# Patient Record
Sex: MALE | Race: WHITE | NOT HISPANIC OR LATINO | Employment: OTHER | URBAN - METROPOLITAN AREA
[De-identification: names, ages, dates, MRNs, and addresses within clinical notes are randomized per-mention and may not be internally consistent; named-entity substitution may affect disease eponyms.]

---

## 2017-02-07 ENCOUNTER — HOSPITAL ENCOUNTER (OUTPATIENT)
Dept: RADIOLOGY | Facility: HOSPITAL | Age: 52
Discharge: HOME | End: 2017-02-07
Attending: PAIN MEDICINE
Payer: MEDICARE

## 2017-02-07 DIAGNOSIS — M47.816 FACET ARTHRITIS, DEGENERATIVE, LUMBAR SPINE: ICD-10-CM

## 2017-02-07 PROCEDURE — 72110 X-RAY EXAM L-2 SPINE 4/>VWS: CPT | Mod: 26,,, | Performed by: RADIOLOGY

## 2017-02-07 PROCEDURE — 72110 X-RAY EXAM L-2 SPINE 4/>VWS: CPT | Mod: TC

## 2017-04-02 ENCOUNTER — HOSPITAL ENCOUNTER (EMERGENCY)
Facility: HOSPITAL | Age: 52
Discharge: HOME | End: 2017-04-02
Attending: SURGERY
Payer: MEDICARE

## 2017-04-02 VITALS
DIASTOLIC BLOOD PRESSURE: 72 MMHG | HEART RATE: 80 BPM | BODY MASS INDEX: 27.12 KG/M2 | RESPIRATION RATE: 16 BRPM | WEIGHT: 168 LBS | SYSTOLIC BLOOD PRESSURE: 118 MMHG | TEMPERATURE: 96 F

## 2017-04-02 DIAGNOSIS — G89.29 CHRONIC RIGHT SHOULDER PAIN: Primary | ICD-10-CM

## 2017-04-02 DIAGNOSIS — M25.511 CHRONIC RIGHT SHOULDER PAIN: Primary | ICD-10-CM

## 2017-04-02 PROCEDURE — 99283 EMERGENCY DEPT VISIT LOW MDM: CPT | Mod: 25

## 2017-04-02 PROCEDURE — 96372 THER/PROPH/DIAG INJ SC/IM: CPT

## 2017-04-02 PROCEDURE — 29240 STRAPPING OF SHOULDER: CPT

## 2017-04-02 PROCEDURE — 63600175 PHARM REV CODE 636 W HCPCS: Performed by: SURGERY

## 2017-04-02 RX ORDER — HYDROCODONE BITARTRATE AND ACETAMINOPHEN 7.5; 325 MG/1; MG/1
1 TABLET ORAL EVERY 6 HOURS PRN
COMMUNITY
End: 2018-11-30

## 2017-04-02 RX ORDER — LISINOPRIL 10 MG/1
10 TABLET ORAL DAILY
COMMUNITY
End: 2019-01-07

## 2017-04-02 RX ORDER — PROMETHAZINE HYDROCHLORIDE 25 MG/1
25 TABLET ORAL EVERY 4 HOURS
COMMUNITY
End: 2018-11-30

## 2017-04-02 RX ORDER — CYCLOBENZAPRINE HCL 10 MG
10 TABLET ORAL 3 TIMES DAILY PRN
Qty: 10 TABLET | Refills: 0 | Status: SHIPPED | OUTPATIENT
Start: 2017-04-02 | End: 2017-04-07

## 2017-04-02 RX ORDER — KETOROLAC TROMETHAMINE 10 MG/1
10 TABLET, FILM COATED ORAL EVERY 6 HOURS PRN
Qty: 15 TABLET | Refills: 0 | Status: SHIPPED | OUTPATIENT
Start: 2017-04-02 | End: 2017-07-04 | Stop reason: ALTCHOICE

## 2017-04-02 RX ORDER — PROMETHAZINE HYDROCHLORIDE 25 MG/ML
12.5 INJECTION, SOLUTION INTRAMUSCULAR; INTRAVENOUS
Status: COMPLETED | OUTPATIENT
Start: 2017-04-02 | End: 2017-04-02

## 2017-04-02 RX ORDER — HYDROMORPHONE HYDROCHLORIDE 1 MG/ML
1 INJECTION, SOLUTION INTRAMUSCULAR; INTRAVENOUS; SUBCUTANEOUS
Status: COMPLETED | OUTPATIENT
Start: 2017-04-02 | End: 2017-04-02

## 2017-04-02 RX ADMIN — PROMETHAZINE HYDROCHLORIDE 12.5 MG: 25 INJECTION INTRAMUSCULAR; INTRAVENOUS at 09:04

## 2017-04-02 RX ADMIN — HYDROMORPHONE HYDROCHLORIDE 1 MG: 1 INJECTION, SOLUTION INTRAMUSCULAR; INTRAVENOUS; SUBCUTANEOUS at 09:04

## 2017-04-02 NOTE — ED AVS SNAPSHOT
[facility]  [address]  [place] [address]               Miles Currie   2017  9:03 PM   ED    Description:  Male : 1965   Department:  [facility]           Your Care was Coordinated By:     Provider Role From To    Bernard Jimenez MD Attending Provider 17 --      Reason for Visit     Shoulder Pain           Diagnoses this Visit        Comments    Chronic right shoulder pain    -  Primary       ED Disposition     ED Disposition Condition Comment    Discharge             To Do List           Follow-up Information     Follow up with Naresh Guevara MD.    Specialty:  Orthopedic Surgery    Contact information:    [place] [address]  [phone]         These Medications        Disp Refills Start End    ketorolac (TORADOL) 10 mg tablet 15 tablet 0 2017     Take 1 tablet (10 mg total) by mouth every 6 (six) hours as needed for Pain. - Oral    cyclobenzaprine (FLEXERIL) 10 MG tablet 10 tablet 0 2017    Take 1 tablet (10 mg total) by mouth 3 (three) times daily as needed for Muscle spasms. - Oral      [facility]     [facility] Nurse Care Line -  Assistance  Unless otherwise directed by your provider, please contact [facility], our nurse care line that is available for  assistance.     Registered nurses in the [facility] provide: appointment scheduling, clinical advisement, health education, and other advisory services.  Call: [phone] (toll free)               Medications           Message regarding Medications     Verify the changes and/or additions to your medication regime listed below are the same as discussed with your clinician today.  If any of these changes or additions are incorrect, please notify your healthcare provider.        START taking these NEW medications        Refills    ketorolac (TORADOL) 10 mg tablet 0    Sig: Take 1 tablet (10 mg  total) by mouth every 6 (six) hours as needed for Pain.    Class: Print    Route: Oral    cyclobenzaprine (FLEXERIL) 10 MG tablet 0    Sig: Take 1 tablet (10 mg total) by mouth 3 (three) times daily as needed for Muscle spasms.    Class: Print    Route: Oral      These medications were administered today        Dose Freq    HYDROmorphone injection 1 mg 1 mg ED 1 Time    Sig: Inject 1 mL (1 mg total) into the muscle ED 1 Time.    Class: Normal    Route: Intramuscular    promethazine injection 12.5 mg 12.5 mg ED 1 Time    Sig: Inject 0.5 mLs (12.5 mg total) into the muscle ED 1 Time.    Class: Normal    Route: Intramuscular           Verify that the below list of medications is an accurate representation of the medications you are currently taking.  If none reported, the list may be blank. If incorrect, please contact your healthcare provider. Carry this list with you in case of emergency.           Current Medications     hydrocodone-acetaminophen 7.5-325mg (NORCO) 7.5-325 mg per tablet Take 1 tablet by mouth every 6 (six) hours as needed for Pain.    lisinopril 10 MG tablet Take 10 mg by mouth once daily.    promethazine (PHENERGAN) 25 MG tablet Take 25 mg by mouth every 4 (four) hours.    clonazePAM (KLONOPIN) 0.5 MG tablet Take 0.5 mg by mouth 2 (two) times daily as needed for Anxiety.    cyclobenzaprine (FLEXERIL) 10 MG tablet Take 1 tablet (10 mg total) by mouth 3 (three) times daily as needed for Muscle spasms.    ketorolac (TORADOL) 10 mg tablet Take 1 tablet (10 mg total) by mouth every 6 (six) hours as needed for Pain.    methylPREDNISolone (MEDROL DOSEPACK) 4 mg tablet Pack as directed           Clinical Reference Information           Your Vitals Were     BP Pulse Temp Resp Weight BMI    137/66 94 95.9 °F (35.5 °C) (Oral) 18 76.2 kg (168 lb) 27.12 kg/m2      Allergies as of 4/2/2017     No Known Allergies      Immunizations Administered on Date of Encounter - 4/2/2017     None      ED Micro, Lab, POCT      None      ED Imaging Orders     Start Ordered       Status Ordering Provider    04/02/17 2100 04/02/17 2059  X-ray Shoulder 2 or More Views Right  1 time imaging      In process       Discharge References/Attachments     SHOULDER JOINT, THE (ENGLISH)      [facility] Sign-Up     Activating your [facility] account is as easy as 1-2-3!     1) Visit [email or web address], select Sign Up Now, enter this activation code and your date of birth, then select Next.  [email or web address]  Expires: 5/17/2017 10:12 PM      2) Create a username and password to use when you visit [facility] in the future and select a security question in case you lose your password and select Next.    3) Enter your e-mail address and click Sign Up!    Additional Information  If you have questions, please e-mail [email or web address] or call [phone] to talk to our [facility] staff. Remember, [facility] is NOT to be used for urgent needs. For medical emergencies, dial 911.          [facility] complies with applicable Federal civil rights laws and does not discriminate on the basis of race, color, national origin, age, disability, or sex.        Language Assistance Services     ATTENTION: Language assistance services are available, free of charge. Please call [phone].      ATENCIÓN: Si habla español, tiene a grimm disposición servicios gratuitos de asistencia lingüística. Llame al [phone].     CHÚ Ý: N?u b?n nói Ti?ng Vi?t, có các d?ch v? h? tr? ngôn ng? mi?n phí dành cho b?n. G?i s? [phone].

## 2017-04-03 NOTE — ED TRIAGE NOTES
C/O right shoulder pain for 2 to 3 weeks.  Sees pain management for back.  States cannot have MRI due to pacemaker.

## 2017-04-03 NOTE — ED PROVIDER NOTES
[facility]                                        April 2, 2017                   Chief Complaint  51 y.o. male with Shoulder Pain (right)    History of Present Illness  Miles Currie presents to the emergency room with right shoulder pain chronically  Patient states he injured his right shoulder 2 months ago with a dislocation at that time  Patient states he reduced his shoulder dislocation on his own with residual pain after  Patient on exam has a normal right shoulder no deformity or instability or bruising now  Patient has no swelling, no signs of rotator cuff injury on ER evaluation, normal tone  Patient has good distal pulses and capillary refill, is neurovascular intact on exam    The history is provided by the patient  Medical history: CAD, HTN, mitral incompetence  Surgical history: Cardiac pacemaker, cardiac surgery  No Known Allergies     Review of Systems and Physical Exam     Review of Systems  -- Constitution - no fever, denies fatigue, no weakness, no chills  -- Eyes - no tearing or redness, no visual disturbance  -- Ear, Nose - no tinnitus or earache, no nasal congestion or discharge  -- Mouth,Throat - no sore throat, no toothache, normal voice, normal swallowing  -- Respiratory - denies cough and congestion, no shortness of breath, no PIERRE  -- Cardiovascular - denies chest pain, no palpitations, denies claudication  -- Gastrointestinal - denies abdominal pain, nausea, vomiting, or diarrhea  -- Musculoskeletal - right shoulder pain chronically  -- Neurological - no headache, denies weakness or seizure; no LOC  -- Skin - denies pallor, rash, or changes in skin. no hives or welts noted    Vital Signs  -- His blood pressure is 118/72 and his pulse is 80. His respiration is 16.      Physical Exam  -- Nursing note and vitals reviewed  -- Constitutional: Appears well-developed and well-nourished  -- Head: Atraumatic. Normocephalic. No obvious abnormality  -- Eyes: Pupils are equal and  reactive to light. Normal conjunctiva and lids  -- Neck: Normal range of motion. Neck supple. No masses, trachea midline  -- Cardiac: Normal rate, regular rhythm and normal heart sounds  -- Pulmonary: Normal respiratory effort, breath sounds clear to auscultation  -- Abdominal: Soft, no tenderness. Normal bowel sounds. Normal liver edge  -- Musculoskeletal: Normal range of motion, no effusions. Joints stable   -- Neurological: No focal deficits. Showed good interaction with staff  -- Vascular: Posterior tibial, dorsalis pedis and radial pulses 2+ bilaterally      Emergency Room Course     Treatment and Evaluation  -- Preliminary ER x-ray readings showed no evidence of fracture or dislocation  -- All x-rays are reviewed with a final disposition given by the radiologist   -- Sling and swathe placed on the affected limb by the CNA for comfort and decreased pain    -- IM 1 mg Dilaudid given today in the ER  -- IM 12.5 mg Phenergan given today in the ER    Diagnosis  -- The encounter diagnosis was Chronic right shoulder pain.    Disposition and Plan  -- Disposition: home  -- Condition: stable  -- Follow-up: Patient to follow up with Sarika Pavon MD in 1-2 days.  -- I advised the patient that we have found no life threatening condition today  -- At this time, I believe the patient is clinically stable for discharge.   -- The patient acknowledges that close follow up with a MD is required   -- Patient agrees to comply with all instruction and direction given in the ER    This note is dictated on Dragon Natural Speaking word recognition program.  There are word recognition mistakes that are occasionally missed on review.           Bernard Jimenez MD  04/02/17 0774

## 2017-04-25 ENCOUNTER — PATIENT MESSAGE (OUTPATIENT)
Dept: ENDOCRINOLOGY | Facility: CLINIC | Age: 52
End: 2017-04-25

## 2017-04-25 ENCOUNTER — HOSPITAL ENCOUNTER (OUTPATIENT)
Dept: RADIOLOGY | Facility: HOSPITAL | Age: 52
Discharge: HOME | End: 2017-04-25
Attending: PAIN MEDICINE
Payer: MEDICARE

## 2017-04-25 DIAGNOSIS — S46.009A ROTATOR CUFF INJURY: ICD-10-CM

## 2017-04-25 DIAGNOSIS — M19.019 OSTEOARTHRITIS OF SHOULDER: ICD-10-CM

## 2017-04-25 PROCEDURE — 73200 CT UPPER EXTREMITY W/O DYE: CPT | Mod: TC,RT

## 2017-04-25 PROCEDURE — 73200 CT UPPER EXTREMITY W/O DYE: CPT | Mod: 26,RT,, | Performed by: RADIOLOGY

## 2017-04-28 ENCOUNTER — TELEPHONE (OUTPATIENT)
Dept: INTERNAL MEDICINE | Facility: CLINIC | Age: 52
End: 2017-04-28

## 2017-04-28 NOTE — TELEPHONE ENCOUNTER
----- Message from Cassandra Olea sent at 4/25/2017 11:46 AM CDT -----  Contact: my chart  Appointment Request From: Miles Currie         With Provider: Other - (see comments) [oth]        Would Accept With:Request to see a new provider        Preferred Date Range: From 4/24/2017 To 4/28/2017        Preferred Times: Any        Reason for visit: Request an Appt        Comments:

## 2017-07-04 ENCOUNTER — HOSPITAL ENCOUNTER (EMERGENCY)
Facility: HOSPITAL | Age: 52
Discharge: HOME | End: 2017-07-04
Attending: SURGERY
Payer: MEDICARE

## 2017-07-04 VITALS
BODY MASS INDEX: 29.05 KG/M2 | DIASTOLIC BLOOD PRESSURE: 76 MMHG | WEIGHT: 180 LBS | RESPIRATION RATE: 20 BRPM | HEART RATE: 93 BPM | SYSTOLIC BLOOD PRESSURE: 153 MMHG | TEMPERATURE: 97 F

## 2017-07-04 DIAGNOSIS — K08.89 PAIN, DENTAL: Primary | ICD-10-CM

## 2017-07-04 PROCEDURE — 96372 THER/PROPH/DIAG INJ SC/IM: CPT

## 2017-07-04 PROCEDURE — 63600175 PHARM REV CODE 636 W HCPCS: Performed by: SURGERY

## 2017-07-04 PROCEDURE — 99283 EMERGENCY DEPT VISIT LOW MDM: CPT | Mod: 25

## 2017-07-04 RX ORDER — KETOROLAC TROMETHAMINE 10 MG/1
10 TABLET, FILM COATED ORAL EVERY 6 HOURS PRN
Qty: 15 TABLET | Refills: 0 | OUTPATIENT
Start: 2017-07-04 | End: 2018-11-30

## 2017-07-04 RX ORDER — KETOROLAC TROMETHAMINE 30 MG/ML
60 INJECTION, SOLUTION INTRAMUSCULAR; INTRAVENOUS
Status: COMPLETED | OUTPATIENT
Start: 2017-07-04 | End: 2017-07-04

## 2017-07-04 RX ORDER — AMOXICILLIN 875 MG/1
875 TABLET, FILM COATED ORAL 2 TIMES DAILY
Qty: 14 TABLET | Refills: 0 | Status: SHIPPED | OUTPATIENT
Start: 2017-07-04 | End: 2017-07-11

## 2017-07-04 RX ADMIN — KETOROLAC TROMETHAMINE 60 MG: 30 INJECTION, SOLUTION INTRAMUSCULAR at 10:07

## 2017-07-04 RX ADMIN — PENICILLIN G BENZATHINE 1.2 MILLION UNITS: 1200000 INJECTION, SUSPENSION INTRAMUSCULAR at 10:07

## 2017-07-04 NOTE — ED NOTES
Injection given as ordered and charted per MAR. Instructed to wait 15 additional minutes prior to leaving to monitor for reaction. Instructed to notify staff if reaction is suspected. Pt voiced understanding.

## 2017-07-04 NOTE — ED TRIAGE NOTES
Assumed care of 51 y.o. male presents to ER   Chief Complaint   Patient presents with    Dental Pain     right lower side   onset two days ago. No acute distress noted.

## 2017-07-04 NOTE — ED PROVIDER NOTES
[facility]                                        July 4, 2017                   Chief Complaint  51 y.o. male with Dental Pain (right lower side)    History of Present Illness  Miles Currie presents to the emergency room with dental pain this week  Patient on exam is upper lower molar cavities with no facial swelling noted  In particular, the patient has a severe right lower molar cavity with pain now  Patient has no fever or signs of dental abscess, cannot afford a dentist now  Pt is afebrile with good stable vital signs, only complaint today is dental pain    The history is provided by the patient  Medical history: CAD, HTN, mitral incompetence  Surgical history: Cardiac pacemaker, cardiac surgery  No Known Allergies   History reviewed. No pertinent family history.    Review of Systems and Physical Exam     Review of Systems  -- Constitution - no fever, denies fatigue, no weakness, no chills  -- Eyes - no tearing or redness, no visual disturbance  -- Ear, Nose - no tinnitus or earache, no nasal congestion or discharge  -- Mouth,Throat - toothache, normal voice, normal swallowing  -- Respiratory - denies cough and congestion, no shortness of breath, no PIERRE  -- Cardiovascular - denies chest pain, no palpitations, denies claudication  -- Gastrointestinal - denies abdominal pain, nausea, vomiting, or diarrhea  -- Musculoskeletal - denies back pain, negative for myalgias and arthralgias   -- Neurological - no headache, denies weakness or seizure; no LOC  -- Skin - denies pallor, rash, or changes in skin. no hives or welts noted    Vital Signs  -- His oral temperature is 96.7 °F (35.9 °C).   -- His blood pressure is 153/76 and his pulse is 93.   -- His respiration is 20.      Physical Exam  -- Nursing note and vitals reviewed  -- Constitutional: Appears well-developed and well-nourished  -- Head: Atraumatic. Normocephalic. No obvious abnormality  -- Eyes: Pupils are equal and reactive to light.  Normal conjunctiva and lids  -- Nose: Nose normal in appearance, nares grossly normal. No discharge  -- Throat: several upper and lower bilateral molar cavities with no facial swelling     -- Ears: External ears and TM normal bilaterally. Normal hearing and no drainage  -- Neck: Normal range of motion. Neck supple. No masses, trachea midline  -- Cardiac: Normal rate, regular rhythm and normal heart sounds  -- Pulmonary: Normal respiratory effort, breath sounds clear to auscultation  -- Abdominal: Soft, no tenderness. Normal bowel sounds. Normal liver edge  -- Musculoskeletal: Normal range of motion, no effusions. Joints stable   -- Neurological: No focal deficits. Showed good interaction with staff  -- Vascular: Posterior tibial, dorsalis pedis and radial pulses 2+ bilaterally      Emergency Room Course     Treatment and Evaluation  -- IM 1.2 million units Bicillin given today in the ER  -- IM 60 mg Toradol given today in the ER    Diagnosis  -- The encounter diagnosis was Pain, dental.    Disposition and Plan  -- Disposition: to dentist ASAP  -- Condition: stable  -- Pt given instructions; take all medications prescribed in the ER as directed.   -- Patient agrees to comply with all instructions- needs appropriate dental care  -- Pt agrees to return to ER if any symptoms reoccur; symptom-free on discharge  -- Patient to follow up with a dentist in 1-2 days. Has been given follow up information  -- The patient acknowledges that dental follow up is required for this issue     This note is dictated on Dragon Natural Speaking word recognition program.  There are word recognition mistakes that are occasionally missed on review.             Bernard Jimenez MD  07/04/17 6578

## 2017-11-25 ENCOUNTER — HOSPITAL ENCOUNTER (EMERGENCY)
Facility: HOSPITAL | Age: 52
Discharge: HOME | End: 2017-11-25
Attending: SURGERY
Payer: MEDICARE

## 2017-11-25 VITALS
WEIGHT: 175 LBS | HEART RATE: 90 BPM | SYSTOLIC BLOOD PRESSURE: 150 MMHG | RESPIRATION RATE: 16 BRPM | BODY MASS INDEX: 28.25 KG/M2 | DIASTOLIC BLOOD PRESSURE: 60 MMHG | TEMPERATURE: 98 F | OXYGEN SATURATION: 97 %

## 2017-11-25 DIAGNOSIS — R05.3 COUGH, PERSISTENT: Primary | ICD-10-CM

## 2017-11-25 DIAGNOSIS — T46.4X5A COUGH DUE TO ACE INHIBITOR: ICD-10-CM

## 2017-11-25 DIAGNOSIS — R05.8 COUGH DUE TO ACE INHIBITOR: ICD-10-CM

## 2017-11-25 LAB
ALBUMIN SERPL BCP-MCNC: 3.8 G/DL
ALP SERPL-CCNC: 145 U/L
ALT SERPL W/O P-5'-P-CCNC: 34 U/L
ANION GAP SERPL CALC-SCNC: 12 MMOL/L
AST SERPL-CCNC: 24 U/L
BASOPHILS # BLD AUTO: 0.03 K/UL
BASOPHILS NFR BLD: 0.3 %
BILIRUB SERPL-MCNC: 0.4 MG/DL
BUN SERPL-MCNC: 15 MG/DL
CALCIUM SERPL-MCNC: 9.7 MG/DL
CHLORIDE SERPL-SCNC: 103 MMOL/L
CO2 SERPL-SCNC: 25 MMOL/L
CREAT SERPL-MCNC: 1.1 MG/DL
DIFFERENTIAL METHOD: ABNORMAL
EOSINOPHIL # BLD AUTO: 0.3 K/UL
EOSINOPHIL NFR BLD: 3 %
ERYTHROCYTE [DISTWIDTH] IN BLOOD BY AUTOMATED COUNT: 14.2 %
EST. GFR  (AFRICAN AMERICAN): >60 ML/MIN/1.73 M^2
EST. GFR  (NON AFRICAN AMERICAN): >60 ML/MIN/1.73 M^2
GLUCOSE SERPL-MCNC: 159 MG/DL
HCT VFR BLD AUTO: 45.6 %
HGB BLD-MCNC: 14.9 G/DL
LYMPHOCYTES # BLD AUTO: 2.4 K/UL
LYMPHOCYTES NFR BLD: 26.4 %
MCH RBC QN AUTO: 31.4 PG
MCHC RBC AUTO-ENTMCNC: 32.7 G/DL
MCV RBC AUTO: 96 FL
MONOCYTES # BLD AUTO: 0.5 K/UL
MONOCYTES NFR BLD: 5.8 %
NEUTROPHILS # BLD AUTO: 5.9 K/UL
NEUTROPHILS NFR BLD: 64.5 %
PLATELET # BLD AUTO: 215 K/UL
PMV BLD AUTO: 9.3 FL
POTASSIUM SERPL-SCNC: 4.1 MMOL/L
PROT SERPL-MCNC: 7.8 G/DL
RBC # BLD AUTO: 4.74 M/UL
SODIUM SERPL-SCNC: 140 MMOL/L
WBC # BLD AUTO: 9.08 K/UL

## 2017-11-25 PROCEDURE — 36415 COLL VENOUS BLD VENIPUNCTURE: CPT

## 2017-11-25 PROCEDURE — 93010 ELECTROCARDIOGRAM REPORT: CPT | Mod: ,,, | Performed by: INTERNAL MEDICINE

## 2017-11-25 PROCEDURE — 80053 COMPREHEN METABOLIC PANEL: CPT

## 2017-11-25 PROCEDURE — 93005 ELECTROCARDIOGRAM TRACING: CPT

## 2017-11-25 PROCEDURE — 99284 EMERGENCY DEPT VISIT MOD MDM: CPT

## 2017-11-25 PROCEDURE — 25000003 PHARM REV CODE 250: Performed by: EMERGENCY MEDICINE

## 2017-11-25 PROCEDURE — 85025 COMPLETE CBC W/AUTO DIFF WBC: CPT

## 2017-11-25 RX ORDER — BENZONATATE 100 MG/1
200 CAPSULE ORAL 3 TIMES DAILY PRN
Qty: 20 CAPSULE | Refills: 0 | Status: SHIPPED | OUTPATIENT
Start: 2017-11-25 | End: 2017-12-05

## 2017-11-25 RX ORDER — BENZONATATE 100 MG/1
100 CAPSULE ORAL 3 TIMES DAILY PRN
Status: DISCONTINUED | OUTPATIENT
Start: 2017-11-25 | End: 2017-11-25

## 2017-11-25 RX ORDER — BENZONATATE 100 MG/1
100 CAPSULE ORAL
Status: COMPLETED | OUTPATIENT
Start: 2017-11-25 | End: 2017-11-25

## 2017-11-25 RX ADMIN — BENZONATATE 100 MG: 100 CAPSULE ORAL at 07:11

## 2017-11-26 ENCOUNTER — PATIENT MESSAGE (OUTPATIENT)
Dept: ENDOCRINOLOGY | Facility: CLINIC | Age: 52
End: 2017-11-26

## 2017-11-26 NOTE — DISCHARGE INSTRUCTIONS
Contact your cardiologist regarding changing your blood pressure medicine to one which does not cause cough.

## 2017-11-26 NOTE — ED PROVIDER NOTES
Encounter Date: 11/25/2017       History     Chief Complaint   Patient presents with    URI     x 1 month     This 52-year-old white male complains of persistent productive cough for months.  He status post CABG several months ago and also had a blockage to his left leg which was stented.  He denies any chest pain or shortness of breath.  Been no fever or chills.  He is still smoking but to a lesser extent.  He also has a pacemaker.  His cardiologists are in [facility].          Review of patient's allergies indicates:  No Known Allergies  Past Medical History:   Diagnosis Date    Coronary artery disease     Hypertension     MI (mitral incompetence)      Past Surgical History:   Procedure Laterality Date    CARDIAC PACEMAKER PLACEMENT      CARDIAC SURGERY       History reviewed. No pertinent family history.  Social History   Substance Use Topics    Smoking status: Current Every Day Smoker     Packs/day: 1.00     Years: 40.00     Types: Cigarettes    Smokeless tobacco: Not on file    Alcohol use No     Review of Systems   HENT: Positive for congestion.    Respiratory: Positive for cough.    All other systems reviewed and are negative.      Physical Exam     Initial Vitals [11/25/17 1750]   BP Pulse Resp Temp SpO2   (!) 156/60 92 16 98.2 °F (36.8 °C) 96 %      MAP       92         Physical Exam    Nursing note and vitals reviewed.  Constitutional: He appears well-developed and well-nourished.   HENT:   Mouth/Throat: Oropharynx is clear and moist.   Eyes: Conjunctivae are normal. Pupils are equal, round, and reactive to light.   Neck: Normal range of motion. Neck supple.   Cardiovascular: Normal rate and regular rhythm.   Pulmonary/Chest: No respiratory distress. He has wheezes. He has rhonchi.   Abdominal: Soft. Bowel sounds are normal.   Musculoskeletal: Normal range of motion. He exhibits no edema.   Neurological: He is alert and oriented to person, place, and time.   Skin: Skin is warm and dry.    Psychiatric: He has a normal mood and affect.         ED Course   Procedures  Labs Reviewed - No data to display  EKG Readings: (Independently Interpreted)   Initial Reading: No STEMI. Rhythm: Normal Sinus Rhythm. Heart Rate: 83. Ectopy: No Ectopy. Conduction: Normal. T Waves Flipped: V5 and V6.                            ED Course      Clinical Impression:   The primary encounter diagnosis was Cough, persistent. A diagnosis of Cough due to ACE inhibitor was also pertinent to this visit.                           Alec Wilcox MD  11/25/17 1916

## 2018-09-26 ENCOUNTER — HOSPITAL ENCOUNTER (EMERGENCY)
Facility: HOSPITAL | Age: 53
Discharge: HOME | End: 2018-09-26
Attending: INTERNAL MEDICINE
Payer: MEDICARE

## 2018-09-26 VITALS
TEMPERATURE: 97 F | SYSTOLIC BLOOD PRESSURE: 118 MMHG | DIASTOLIC BLOOD PRESSURE: 58 MMHG | RESPIRATION RATE: 16 BRPM | WEIGHT: 178 LBS | OXYGEN SATURATION: 98 % | HEART RATE: 77 BPM | BODY MASS INDEX: 28.73 KG/M2

## 2018-09-26 DIAGNOSIS — L03.116 CELLULITIS OF FOOT, LEFT: Primary | ICD-10-CM

## 2018-09-26 PROCEDURE — 99283 EMERGENCY DEPT VISIT LOW MDM: CPT | Mod: 25

## 2018-09-26 PROCEDURE — 90715 TDAP VACCINE 7 YRS/> IM: CPT | Performed by: INTERNAL MEDICINE

## 2018-09-26 PROCEDURE — 63600175 PHARM REV CODE 636 W HCPCS: Performed by: INTERNAL MEDICINE

## 2018-09-26 PROCEDURE — 90471 IMMUNIZATION ADMIN: CPT | Performed by: INTERNAL MEDICINE

## 2018-09-26 PROCEDURE — 96372 THER/PROPH/DIAG INJ SC/IM: CPT

## 2018-09-26 RX ORDER — SULFAMETHOXAZOLE AND TRIMETHOPRIM 800; 160 MG/1; MG/1
1 TABLET ORAL 2 TIMES DAILY
Qty: 14 TABLET | Refills: 0 | Status: SHIPPED | OUTPATIENT
Start: 2018-09-26 | End: 2018-10-03

## 2018-09-26 RX ORDER — KETOROLAC TROMETHAMINE 30 MG/ML
60 INJECTION, SOLUTION INTRAMUSCULAR; INTRAVENOUS
Status: COMPLETED | OUTPATIENT
Start: 2018-09-26 | End: 2018-09-26

## 2018-09-26 RX ORDER — CEFTRIAXONE 1 G/1
1 INJECTION, POWDER, FOR SOLUTION INTRAMUSCULAR; INTRAVENOUS
Status: COMPLETED | OUTPATIENT
Start: 2018-09-26 | End: 2018-09-26

## 2018-09-26 RX ADMIN — CLOSTRIDIUM TETANI TOXOID ANTIGEN (FORMALDEHYDE INACTIVATED), CORYNEBACTERIUM DIPHTHERIAE TOXOID ANTIGEN (FORMALDEHYDE INACTIVATED), BORDETELLA PERTUSSIS TOXOID ANTIGEN (GLUTARALDEHYDE INACTIVATED), BORDETELLA PERTUSSIS FILAMENTOUS HEMAGGLUTININ ANTIGEN (FORMALDEHYDE INACTIVATED), BORDETELLA PERTUSSIS PERTACTIN ANTIGEN, AND BORDETELLA PERTUSSIS FIMBRIAE 2/3 ANTIGEN 0.5 ML: 5; 2; 2.5; 5; 3; 5 INJECTION, SUSPENSION INTRAMUSCULAR at 03:09

## 2018-09-26 RX ADMIN — CEFTRIAXONE SODIUM 1 G: 1 INJECTION, POWDER, FOR SOLUTION INTRAMUSCULAR; INTRAVENOUS at 03:09

## 2018-09-26 RX ADMIN — KETOROLAC TROMETHAMINE 60 MG: 30 INJECTION, SOLUTION INTRAMUSCULAR at 03:09

## 2018-09-26 NOTE — ED PROVIDER NOTES
Encounter Date: 9/26/2018       History     Chief Complaint   Patient presents with    Cellulitis     left foot     Pt presents with pain on the dorsum of his left foot x 2 days. Pt says that 4 days ago, he was bitten by ants there and then cut the dorsum of his left foot on the door. After that, his foot began to get red and painful at the site of the abrasion/      The history is provided by the patient.     Review of patient's allergies indicates:  No Known Allergies  Past Medical History:   Diagnosis Date    Coronary artery disease     Hypertension     MI (mitral incompetence)      Past Surgical History:   Procedure Laterality Date    CARDIAC PACEMAKER PLACEMENT      CARDIAC SURGERY       No family history on file.  Social History     Tobacco Use    Smoking status: Current Every Day Smoker     Packs/day: 1.00     Years: 40.00     Pack years: 40.00     Types: Cigarettes    Smokeless tobacco: Never Used   Substance Use Topics    Alcohol use: No    Drug use: Not on file     Review of Systems   All other systems reviewed and are negative.      Physical Exam     Initial Vitals [09/26/18 0233]   BP Pulse Resp Temp SpO2   (!) 121/59 86 16 97.2 °F (36.2 °C) 98 %      MAP       --         Physical Exam    Constitutional: He appears well-developed and well-nourished.   HENT:   Head: Normocephalic and atraumatic.   Right Ear: External ear normal.   Left Ear: External ear normal.   Nose: Nose normal.   Mouth/Throat: Oropharynx is clear and moist.   Eyes: EOM are normal. Pupils are equal, round, and reactive to light. Right eye exhibits no discharge. Left eye exhibits no discharge. No scleral icterus.   Neck: Normal range of motion. Neck supple.   Cardiovascular: Normal rate, regular rhythm, normal heart sounds and intact distal pulses.   Pulmonary/Chest: Breath sounds normal.   Abdominal: Soft. Bowel sounds are normal.   Musculoskeletal: Normal range of motion.   Neurological: He is alert and oriented to person,  place, and time. He has normal strength.   Skin: Skin is warm and dry. Capillary refill takes less than 2 seconds.   There is a 1cm abrasion of the dorsum of the left foot which is surrounding by a 2cm zone of erythema. The insect bite vesicles are drying and non-infected. All pulses and cap refill are normal on both LE.         ED Course   Procedures  Labs Reviewed - No data to display       Imaging Results    None          Medical Decision Making:   Initial Assessment:   Left foot pain x 3 days  Differential Diagnosis:   Cellulitis left foot following an abrasion  Insect bite left foot                      Clinical Impression:   The encounter diagnosis was Cellulitis of foot, left.      Disposition:   Disposition: Discharged  Condition: Stable                        Kaley Coreas MD  09/28/18 5423

## 2018-11-30 ENCOUNTER — HOSPITAL ENCOUNTER (EMERGENCY)
Facility: HOSPITAL | Age: 53
Discharge: HOME | End: 2018-11-30
Attending: SURGERY
Payer: MEDICARE

## 2018-11-30 VITALS
HEIGHT: 66 IN | HEART RATE: 81 BPM | DIASTOLIC BLOOD PRESSURE: 78 MMHG | SYSTOLIC BLOOD PRESSURE: 147 MMHG | RESPIRATION RATE: 16 BRPM | OXYGEN SATURATION: 96 % | WEIGHT: 178 LBS | TEMPERATURE: 97 F | BODY MASS INDEX: 28.61 KG/M2

## 2018-11-30 DIAGNOSIS — L08.9 FOOT INFECTION: Primary | ICD-10-CM

## 2018-11-30 DIAGNOSIS — M79.673 FOOT PAIN: ICD-10-CM

## 2018-11-30 LAB
ALBUMIN SERPL BCP-MCNC: 4.1 G/DL
ALP SERPL-CCNC: 110 U/L
ALT SERPL W/O P-5'-P-CCNC: 39 U/L
ANION GAP SERPL CALC-SCNC: 11 MMOL/L
AST SERPL-CCNC: 20 U/L
BASOPHILS # BLD AUTO: 0.03 K/UL
BASOPHILS NFR BLD: 0.4 %
BILIRUB SERPL-MCNC: 0.6 MG/DL
BUN SERPL-MCNC: 17 MG/DL
CALCIUM SERPL-MCNC: 9.4 MG/DL
CHLORIDE SERPL-SCNC: 103 MMOL/L
CO2 SERPL-SCNC: 26 MMOL/L
CREAT SERPL-MCNC: 1 MG/DL
DIFFERENTIAL METHOD: ABNORMAL
EOSINOPHIL # BLD AUTO: 0.2 K/UL
EOSINOPHIL NFR BLD: 2.6 %
ERYTHROCYTE [DISTWIDTH] IN BLOOD BY AUTOMATED COUNT: 14 %
EST. GFR  (AFRICAN AMERICAN): >60 ML/MIN/1.73 M^2
EST. GFR  (NON AFRICAN AMERICAN): >60 ML/MIN/1.73 M^2
GLUCOSE SERPL-MCNC: 178 MG/DL
HCT VFR BLD AUTO: 44.1 %
HGB BLD-MCNC: 14.8 G/DL
LYMPHOCYTES # BLD AUTO: 1.9 K/UL
LYMPHOCYTES NFR BLD: 25.8 %
MCH RBC QN AUTO: 32.5 PG
MCHC RBC AUTO-ENTMCNC: 33.6 G/DL
MCV RBC AUTO: 97 FL
MONOCYTES # BLD AUTO: 0.6 K/UL
MONOCYTES NFR BLD: 8.1 %
NEUTROPHILS # BLD AUTO: 4.7 K/UL
NEUTROPHILS NFR BLD: 63.1 %
PLATELET # BLD AUTO: 188 K/UL
PMV BLD AUTO: 9.3 FL
POTASSIUM SERPL-SCNC: 4.2 MMOL/L
PROT SERPL-MCNC: 7.3 G/DL
RBC # BLD AUTO: 4.55 M/UL
SODIUM SERPL-SCNC: 140 MMOL/L
WBC # BLD AUTO: 7.41 K/UL

## 2018-11-30 PROCEDURE — 36415 COLL VENOUS BLD VENIPUNCTURE: CPT

## 2018-11-30 PROCEDURE — 96372 THER/PROPH/DIAG INJ SC/IM: CPT | Mod: 59

## 2018-11-30 PROCEDURE — 25000003 PHARM REV CODE 250: Performed by: SURGERY

## 2018-11-30 PROCEDURE — S0077 INJECTION, CLINDAMYCIN PHOSP: HCPCS | Performed by: SURGERY

## 2018-11-30 PROCEDURE — 85025 COMPLETE CBC W/AUTO DIFF WBC: CPT

## 2018-11-30 PROCEDURE — 99284 EMERGENCY DEPT VISIT MOD MDM: CPT | Mod: 25

## 2018-11-30 PROCEDURE — 80053 COMPREHEN METABOLIC PANEL: CPT

## 2018-11-30 PROCEDURE — 63600175 PHARM REV CODE 636 W HCPCS: Performed by: SURGERY

## 2018-11-30 RX ORDER — CLINDAMYCIN PHOSPHATE 150 MG/ML
600 INJECTION, SOLUTION INTRAVENOUS
Status: COMPLETED | OUTPATIENT
Start: 2018-11-30 | End: 2018-11-30

## 2018-11-30 RX ORDER — MUPIROCIN 20 MG/G
OINTMENT TOPICAL 3 TIMES DAILY
Qty: 15 G | Refills: 0 | Status: SHIPPED | OUTPATIENT
Start: 2018-11-30 | End: 2018-11-30 | Stop reason: SDUPTHER

## 2018-11-30 RX ORDER — MUPIROCIN 20 MG/G
OINTMENT TOPICAL 3 TIMES DAILY
Qty: 15 G | Refills: 0 | Status: SHIPPED | OUTPATIENT
Start: 2018-11-30 | End: 2018-12-10

## 2018-11-30 RX ORDER — KETOROLAC TROMETHAMINE 10 MG/1
10 TABLET, FILM COATED ORAL EVERY 6 HOURS PRN
Qty: 15 TABLET | Refills: 0 | Status: SHIPPED | OUTPATIENT
Start: 2018-11-30 | End: 2019-01-07

## 2018-11-30 RX ORDER — KETOROLAC TROMETHAMINE 10 MG/1
10 TABLET, FILM COATED ORAL EVERY 6 HOURS PRN
Qty: 15 TABLET | Refills: 0 | Status: SHIPPED | OUTPATIENT
Start: 2018-11-30 | End: 2018-11-30 | Stop reason: SDUPTHER

## 2018-11-30 RX ORDER — MORPHINE SULFATE 4 MG/ML
2 INJECTION, SOLUTION INTRAMUSCULAR; INTRAVENOUS
Status: COMPLETED | OUTPATIENT
Start: 2018-11-30 | End: 2018-11-30

## 2018-11-30 RX ORDER — CLINDAMYCIN HYDROCHLORIDE 300 MG/1
300 CAPSULE ORAL 4 TIMES DAILY
Qty: 28 CAPSULE | Refills: 0 | Status: SHIPPED | OUTPATIENT
Start: 2018-11-30 | End: 2018-11-30 | Stop reason: SDUPTHER

## 2018-11-30 RX ORDER — ONDANSETRON 2 MG/ML
4 INJECTION INTRAMUSCULAR; INTRAVENOUS
Status: COMPLETED | OUTPATIENT
Start: 2018-11-30 | End: 2018-11-30

## 2018-11-30 RX ORDER — CLINDAMYCIN HYDROCHLORIDE 300 MG/1
300 CAPSULE ORAL 4 TIMES DAILY
Qty: 28 CAPSULE | Refills: 0 | Status: SHIPPED | OUTPATIENT
Start: 2018-11-30 | End: 2018-12-07

## 2018-11-30 RX ADMIN — MORPHINE SULFATE 2 MG: 4 INJECTION, SOLUTION INTRAMUSCULAR; INTRAVENOUS at 03:11

## 2018-11-30 RX ADMIN — ONDANSETRON 4 MG: 2 INJECTION INTRAMUSCULAR; INTRAVENOUS at 03:11

## 2018-11-30 RX ADMIN — CLINDAMYCIN PHOSPHATE 600 MG: 150 INJECTION, SOLUTION INTRAVENOUS at 03:11

## 2018-11-30 NOTE — ED TRIAGE NOTES
53 y.o. male presents to ER ED 02/ED 02A   Chief Complaint   Patient presents with    Foot Pain   . No acute distress noted.  Left foot has intermittent redness and swelling consistent with PVD.

## 2018-11-30 NOTE — ED PROVIDER NOTES
[facility]                                                 Chief Complaint  53 y.o. male with Foot Pain    History of Present Illness  Miles Currie presents to the emergency room with left foot ulcer today  Patient has a small dorsal left foot ulcer, chronic left foot pain for months  Patient is a heavy smoker with diagnosed claudication, no acute ischemia  Patient has no signs of gangrene or complication, continues to smoke daily  Patient on exam has superficial ulcer on the dorsal foot, no fluctuance noted  Patient has no drainage, no abscess, no cellulitis or cellulitic spread today  Patient has had plus pulses in the left dorsalis pedis, afebrile VSS today    The history is provided by the patient   device was not used during this ER visit  Medical history: CAD, HTN, mitral incompetence  Surgical history: Cardiac pacemaker, cardiac surgery  No Known Allergies   History reviewed. No pertinent family history.    Review of Systems and Physical Exam      Review of Systems  -- Constitution - no fever, denies fatigue, no weakness, no chills  -- Eyes - no tearing or redness, no visual disturbance  -- Ear, Nose - no tinnitus or earache, no nasal congestion or discharge  -- Mouth,Throat - no sore throat, no toothache, normal voice, normal swallowing  -- Respiratory - denies cough and congestion, no shortness of breath, no PIERRE  -- Cardiovascular - denies chest pain, no palpitations, denies claudication  -- Gastrointestinal - denies abdominal pain, nausea, vomiting, or diarrhea  -- Genitourinary - no dysuria, no hematuria, no flank pain, no bladder pain  -- Musculoskeletal - denies back pain, negative for myalgias and arthralgias   -- Neurological - no headache, denies weakness or seizure; no LOC  -- Skin - ulcer on the left dorsal foot    Vital Signs  His oral temperature is 96.8 °F (36 °C).   His blood pressure is 147/78 and his pulse is 81.   His respiration is 16 and oxygen  saturation is 96%.     Physical Exam  -- Nursing note and vitals reviewed  -- Constitutional: Appears well-developed and well-nourished  -- Head: Atraumatic. Normocephalic. No obvious abnormality  -- Eyes: Pupils are equal and reactive to light. Normal conjunctiva and lids  -- Cardiac: Normal rate, regular rhythm and normal heart sounds  -- Pulmonary: Normal respiratory effort, breath sounds clear to auscultation  -- Abdominal: Soft, no tenderness. Normal bowel sounds. Normal liver edge  -- Musculoskeletal: Normal range of motion, no effusions. Joints stable   -- Neurological: No focal deficits. Showed good interaction with staff  -- Skin: Small superficial ulcer on the left foot, no cellulitis    Emergency Room Course          Treatment and Evaluation  -- Preliminary ER x-ray readings showed no evidence of fracture or dislocation  -- All x-rays are reviewed with a final disposition given by the radiologist  -- The electrolytes drawn in the ER today were within normal limits  -- The CBC drawn in the ER today was within normal limits    Medications Given  clindamycin injection 600 mg (600 mg Intramuscular Given 11/30/18 0331)   morphine injection 2 mg (2 mg Intramuscular Given 11/30/18 0331)   ondansetron injection 4 mg (4 mg Intramuscular Given 11/30/18 0332)     Diagnosis  -- The primary encounter diagnosis was Foot infection.   -- A diagnosis of Foot pain was also pertinent to this visit.    Disposition and Plan  -- Disposition: home  -- Condition: stable  -- Follow-up: Patient to follow up with Sarika Pavon MD in 1-2 days.  -- I advised the patient that we have found no life threatening condition today  -- At this time, I believe the patient is clinically stable for discharge.   -- The patient acknowledges that close follow up with a MD is required   -- Patient agrees to comply with all instruction and direction given in the ER    This note is dictated on M*Modal word recognition program.  There are word  recognition mistakes that are occasionally missed on review.          Bernard Jimenez MD  11/30/18 3494

## 2018-11-30 NOTE — NURSING
Complaint of left foot reddness and selling since yesterday. Had injured his foot a few months ago and has been having trouble ever since; denies recent injury. Skin red and peeling.

## 2019-01-09 PROBLEM — I73.9 PAD (PERIPHERAL ARTERY DISEASE): Status: ACTIVE | Noted: 2019-01-09

## 2019-03-27 PROBLEM — I73.9 PERIPHERAL VASCULAR DISEASE, UNSPECIFIED: Status: ACTIVE | Noted: 2019-03-27

## 2019-06-25 PROBLEM — I70.229 CRITICAL LOWER LIMB ISCHEMIA: Status: ACTIVE | Noted: 2019-06-25

## 2019-06-25 PROBLEM — I73.9 PVD (PERIPHERAL VASCULAR DISEASE) WITH CLAUDICATION: Status: ACTIVE | Noted: 2019-06-25

## 2019-06-25 PROBLEM — Z95.828 H/O AORTA-ILIAC-FEMORAL BYPASS: Status: ACTIVE | Noted: 2019-06-25

## 2019-06-27 PROBLEM — Z18.89 OTHER SPECIFIED RETAINED FOREIGN BODY FRAGMENTS: Status: ACTIVE | Noted: 2019-06-27

## 2019-06-29 PROBLEM — R05.9 COUGH: Status: ACTIVE | Noted: 2019-06-29

## 2019-06-29 PROBLEM — Z72.0 TOBACCO ABUSE: Status: ACTIVE | Noted: 2019-06-29

## 2019-06-29 PROBLEM — R91.1 PULMONARY NODULE, RIGHT: Status: ACTIVE | Noted: 2019-06-29

## 2019-08-25 ENCOUNTER — HOSPITAL ENCOUNTER (EMERGENCY)
Facility: HOSPITAL | Age: 54
Discharge: HOME | End: 2019-08-26
Attending: SURGERY
Payer: MEDICARE

## 2019-08-25 DIAGNOSIS — K08.9 CHRONIC DENTAL PAIN: Primary | ICD-10-CM

## 2019-08-25 DIAGNOSIS — G89.29 CHRONIC DENTAL PAIN: Primary | ICD-10-CM

## 2019-08-25 PROCEDURE — 63600175 PHARM REV CODE 636 W HCPCS: Performed by: SURGERY

## 2019-08-25 PROCEDURE — 96372 THER/PROPH/DIAG INJ SC/IM: CPT

## 2019-08-25 PROCEDURE — 99284 EMERGENCY DEPT VISIT MOD MDM: CPT | Mod: 25

## 2019-08-25 RX ORDER — AMOXICILLIN 875 MG/1
875 TABLET, FILM COATED ORAL 2 TIMES DAILY
Qty: 14 TABLET | Refills: 0 | Status: SHIPPED | OUTPATIENT
Start: 2019-08-25 | End: 2019-09-01

## 2019-08-25 RX ORDER — ONDANSETRON 2 MG/ML
4 INJECTION INTRAMUSCULAR; INTRAVENOUS
Status: COMPLETED | OUTPATIENT
Start: 2019-08-25 | End: 2019-08-25

## 2019-08-25 RX ORDER — IBUPROFEN 800 MG/1
800 TABLET ORAL EVERY 6 HOURS PRN
Qty: 20 TABLET | Refills: 0 | Status: SHIPPED | OUTPATIENT
Start: 2019-08-25 | End: 2019-12-17

## 2019-08-25 RX ORDER — MORPHINE SULFATE 2 MG/ML
2 INJECTION, SOLUTION INTRAMUSCULAR; INTRAVENOUS
Status: COMPLETED | OUTPATIENT
Start: 2019-08-25 | End: 2019-08-25

## 2019-08-25 RX ADMIN — MORPHINE SULFATE 2 MG: 2 INJECTION, SOLUTION INTRAMUSCULAR; INTRAVENOUS at 11:08

## 2019-08-25 RX ADMIN — ONDANSETRON 4 MG: 2 INJECTION INTRAMUSCULAR; INTRAVENOUS at 11:08

## 2019-08-25 RX ADMIN — PENICILLIN G BENZATHINE 1.2 MILLION UNITS: 1200000 INJECTION, SUSPENSION INTRAMUSCULAR at 11:08

## 2019-08-26 VITALS
RESPIRATION RATE: 18 BRPM | WEIGHT: 168 LBS | HEART RATE: 70 BPM | TEMPERATURE: 97 F | OXYGEN SATURATION: 97 % | DIASTOLIC BLOOD PRESSURE: 74 MMHG | SYSTOLIC BLOOD PRESSURE: 140 MMHG | BODY MASS INDEX: 27.12 KG/M2

## 2019-08-26 NOTE — ED NOTES
Pt provided education on IM injections and S/S IM injection reaction.  Pt instructed not to drive or operate heavy machinery after receiving narcotic injection. Pt verbalizes understanding.

## 2019-08-26 NOTE — ED NOTES
Patient provided D/C instructions at the bedside.  Patient was provided the opportunity to review D/C summary and diagnosis.  Patient has no further questions or concerns in regards to treatment/care they have received today in the emergency department.  Patient acknowledged discharge teachings and follow-up appointment as directed.  Patient had steady gait while exiting the emergency department. Patient left with girlfriend.

## 2019-08-26 NOTE — ED PROVIDER NOTES
[facility]                                                 Chief Complaint  53 y.o. male with Dental Pain    History of Present Illness  Miles Currie presents to the emergency room with chronic dental pain  Patient has upper and lower molar cavities with no facial swelling noted  Patient has 5/10 dental pain for last couple weeks, no fever in the ER now  Patient has no signs of dental abscess, has several medical comorbidities  Patient is a heavy smoker with chronic dental problems, no active infection    The history is provided by the patient   device was not used during this ER visit  Medical history: CAD, HTN, mitral incompetence  Surgical history: Cardiac pacemaker, cardiac surgery  Now allergies: Fish oil and gabapentin  History reviewed. No pertinent family history.    I have reviewed all of this patient's past medical, surgical, family, and social   histories as well as active allergies and medications documented in the  electronic medical record    Review of Systems and Physical Exam      Review of Systems  -- Constitution - no fever, denies fatigue, no weakness, no chills  -- Eyes - no tearing or redness, no visual disturbance  -- Ear, Nose - no tinnitus or earache, no nasal congestion or discharge  -- Mouth,Throat - toothache, normal voice, normal swallowing  -- Respiratory - denies cough and congestion, no shortness of breath, no PIERRE  -- Cardiovascular - denies chest pain, no palpitations, denies claudication  -- Gastrointestinal - denies abdominal pain, nausea, vomiting, or diarrhea  -- Musculoskeletal - denies back pain, negative for trauma or injury  -- Neurological - no headache, denies weakness or seizure; no LOC  -- Skin - denies pallor, rash, or changes in skin. no hives or welts noted    Vital Signs  His tympanic temperature is 98.7 °F (37.1 °C).   His blood pressure is 142/84 and his pulse is 76.   His respiration is 18 and oxygen saturation is 96%.      Physical Exam  -- Nursing note and vitals reviewed  -- Constitutional: Appears well-developed and well-nourished  -- Head: Atraumatic. Normocephalic. No obvious abnormality  -- Eyes: Pupils are equal and reactive to light. Normal conjunctiva and lids  -- Nose: Nose normal in appearance, nares grossly normal. No discharge  -- Throat: several upper and lower bilateral molar cavities with no facial swelling     -- Ears: External ears and TM normal bilaterally. Normal hearing and no drainage  -- Neck: Normal range of motion. Neck supple. No masses, trachea midline  -- Cardiac: Normal rate, regular rhythm and normal heart sounds  -- Pulmonary: Normal respiratory effort, breath sounds clear to auscultation  -- Abdominal: Soft, no tenderness. Normal bowel sounds. Normal liver edge  -- Musculoskeletal: Normal range of motion, no effusions. Joints stable   -- Neurological: No focal deficits. Showed good interaction with staff    Emergency Room Course      Medications Given  morphine injection 2 mg (has no administration in time range)   ondansetron injection 4 mg (has no administration in time range)   penicillin G benzathine (BICILLIN LA) injection 1.2 Million Units      Diagnosis  -- The encounter diagnosis was Chronic dental pain.    Disposition and Plan  -- Disposition: to dentist ASAP  -- Condition: stable  -- Pt given instructions; take all medications prescribed in the ER as directed.   -- Patient agrees to comply with all instructions- needs appropriate dental care  -- Pt agrees to return to ER if any symptoms reoccur; symptom-free on discharge  -- Patient to follow up with a dentist in 1-2 days. Has been given follow up information  -- The patient acknowledges that dental follow up is required for this issue     This note is dictated on M*Modal word recognition program.  There are word recognition mistakes that are occasionally missed on review.         Bernard Jimenez MD  08/25/19 2192

## 2019-09-19 ENCOUNTER — TELEPHONE (OUTPATIENT)
Dept: FAMILY MEDICINE | Facility: CLINIC | Age: 54
End: 2019-09-19

## 2019-09-19 NOTE — TELEPHONE ENCOUNTER
----- Message from Darby Rodriguez sent at 2019  9:57 AM CDT -----  Contact: KavyaRaheem  MRN: [id number]  : 1965  PCP: Saritha Coats Jr  Home Phone      [phone]  Work Phone      Not on file.  Mobile          [phone]  Home Phone      [phone]      MESSAGE:   Patient states they were referred by Dr Alex Pittman to Dr Beal to be seen as a PCP. Would like to know if we received paperwork.    I told patient Dr Beal wasn't taking new patients, would like like a call.    Kavya  [phone]

## 2019-12-17 PROBLEM — I70.90 ARTERIAL OCCLUSION: Status: ACTIVE | Noted: 2019-12-17

## 2020-05-21 ENCOUNTER — HOSPITAL ENCOUNTER (EMERGENCY)
Facility: HOSPITAL | Age: 55
Discharge: HOME | End: 2020-05-21
Attending: SURGERY
Payer: MEDICARE

## 2020-05-21 VITALS
HEART RATE: 92 BPM | RESPIRATION RATE: 18 BRPM | SYSTOLIC BLOOD PRESSURE: 133 MMHG | BODY MASS INDEX: 26.03 KG/M2 | DIASTOLIC BLOOD PRESSURE: 63 MMHG | TEMPERATURE: 97 F | WEIGHT: 161.25 LBS | OXYGEN SATURATION: 99 %

## 2020-05-21 DIAGNOSIS — R73.9 HYPERGLYCEMIA: Primary | ICD-10-CM

## 2020-05-21 LAB
ALBUMIN SERPL BCP-MCNC: 3.4 G/DL (ref 3.5–5.2)
ALP SERPL-CCNC: 177 U/L (ref 55–135)
ALT SERPL W/O P-5'-P-CCNC: 26 U/L (ref 10–44)
ANION GAP SERPL CALC-SCNC: 10 MMOL/L (ref 8–16)
AST SERPL-CCNC: 13 U/L (ref 10–40)
B-OH-BUTYR BLD STRIP-SCNC: 0.1 MMOL/L (ref 0–0.5)
BASOPHILS # BLD AUTO: 0.03 K/UL (ref 0–0.2)
BASOPHILS NFR BLD: 0.4 % (ref 0–1.9)
BILIRUB SERPL-MCNC: 0.8 MG/DL (ref 0.1–1)
BUN SERPL-MCNC: 10 MG/DL (ref 6–20)
CALCIUM SERPL-MCNC: 8.8 MG/DL (ref 8.7–10.5)
CHLORIDE SERPL-SCNC: 102 MMOL/L (ref 95–110)
CO2 SERPL-SCNC: 21 MMOL/L (ref 23–29)
CREAT SERPL-MCNC: 0.9 MG/DL (ref 0.5–1.4)
DIFFERENTIAL METHOD: NORMAL
EOSINOPHIL # BLD AUTO: 0.2 K/UL (ref 0–0.5)
EOSINOPHIL NFR BLD: 2.1 % (ref 0–8)
ERYTHROCYTE [DISTWIDTH] IN BLOOD BY AUTOMATED COUNT: 13.5 % (ref 11.5–14.5)
EST. GFR  (AFRICAN AMERICAN): >60 ML/MIN/1.73 M^2
EST. GFR  (NON AFRICAN AMERICAN): >60 ML/MIN/1.73 M^2
GLUCOSE SERPL-MCNC: 444 MG/DL (ref 70–110)
HCT VFR BLD AUTO: 44.9 % (ref 40–54)
HGB BLD-MCNC: 15 G/DL (ref 14–18)
IMM GRANULOCYTES # BLD AUTO: 0.01 K/UL (ref 0–0.04)
IMM GRANULOCYTES NFR BLD AUTO: 0.1 % (ref 0–0.5)
LYMPHOCYTES # BLD AUTO: 1.6 K/UL (ref 1–4.8)
LYMPHOCYTES NFR BLD: 20.5 % (ref 18–48)
MCH RBC QN AUTO: 30.7 PG (ref 27–31)
MCHC RBC AUTO-ENTMCNC: 33.4 G/DL (ref 32–36)
MCV RBC AUTO: 92 FL (ref 82–98)
MONOCYTES # BLD AUTO: 0.4 K/UL (ref 0.3–1)
MONOCYTES NFR BLD: 5.5 % (ref 4–15)
NEUTROPHILS # BLD AUTO: 5.4 K/UL (ref 1.8–7.7)
NEUTROPHILS NFR BLD: 71.4 % (ref 38–73)
NRBC BLD-RTO: 0 /100 WBC
PLATELET # BLD AUTO: 198 K/UL (ref 150–350)
PMV BLD AUTO: 10 FL (ref 9.2–12.9)
POCT GLUCOSE: 208 MG/DL (ref 70–110)
POCT GLUCOSE: 292 MG/DL (ref 70–110)
POCT GLUCOSE: 477 MG/DL (ref 70–110)
POTASSIUM SERPL-SCNC: 4.2 MMOL/L (ref 3.5–5.1)
PROT SERPL-MCNC: 7.1 G/DL (ref 6–8.4)
RBC # BLD AUTO: 4.88 M/UL (ref 4.6–6.2)
SODIUM SERPL-SCNC: 133 MMOL/L (ref 136–145)
WBC # BLD AUTO: 7.62 K/UL (ref 3.9–12.7)

## 2020-05-21 PROCEDURE — 80053 COMPREHEN METABOLIC PANEL: CPT | Mod: HCNC

## 2020-05-21 PROCEDURE — 82962 GLUCOSE BLOOD TEST: CPT | Mod: HCNC,91

## 2020-05-21 PROCEDURE — 96361 HYDRATE IV INFUSION ADD-ON: CPT | Mod: HCNC

## 2020-05-21 PROCEDURE — 85025 COMPLETE CBC W/AUTO DIFF WBC: CPT | Mod: HCNC

## 2020-05-21 PROCEDURE — 82010 KETONE BODYS QUAN: CPT | Mod: HCNC

## 2020-05-21 PROCEDURE — 63600175 PHARM REV CODE 636 W HCPCS: Mod: HCNC | Performed by: SURGERY

## 2020-05-21 PROCEDURE — 99284 EMERGENCY DEPT VISIT MOD MDM: CPT | Mod: 25,HCNC

## 2020-05-21 PROCEDURE — 25000003 PHARM REV CODE 250: Performed by: SURGERY

## 2020-05-21 PROCEDURE — 96374 THER/PROPH/DIAG INJ IV PUSH: CPT | Mod: HCNC

## 2020-05-21 RX ORDER — SODIUM CHLORIDE 9 MG/ML
1000 INJECTION, SOLUTION INTRAVENOUS
Status: COMPLETED | OUTPATIENT
Start: 2020-05-21 | End: 2020-05-21

## 2020-05-21 RX ORDER — METFORMIN HYDROCHLORIDE 500 MG/1
500 TABLET ORAL 2 TIMES DAILY WITH MEALS
Qty: 30 TABLET | Refills: 0 | Status: SHIPPED | OUTPATIENT
Start: 2020-05-21 | End: 2020-06-10 | Stop reason: SDUPTHER

## 2020-05-21 RX ADMIN — SODIUM CHLORIDE 1000 ML: 0.9 INJECTION, SOLUTION INTRAVENOUS at 10:05

## 2020-05-21 RX ADMIN — INSULIN HUMAN 5 UNITS: 100 INJECTION, SOLUTION PARENTERAL at 11:05

## 2020-05-21 NOTE — ED PROVIDER NOTES
"Encounter Date: 5/21/2020       History     Chief Complaint   Patient presents with    Hyperglycemia     pt states he ran out of his diabetes medications a " couple months ago" and this blood sugar was 572 this morning     Patient is 54-year-old white male with history of diabetes mellitus and peripheral artery disease. He also continues to smoke on a daily basis.  He was diagnosed with his diabetes several months ago, has been noncompliant with follow-up and medications.  Presented today with weakness, he reports elevated serum glucose on his home monitor of over 500.  In the triage area here he was measured at 476.        Review of patient's allergies indicates:   Allergen Reactions    Fish oil Swelling    Gabapentin Swelling     Past Medical History:   Diagnosis Date    Anticoagulant long-term use     plavix and aspiin, pletal    Anxiety and depression     Bipolar disorder     CHF (congestive heart failure)     Coronary artery disease     Diabetes mellitus     Dyslipidemia     Encounter for blood transfusion     GERD (gastroesophageal reflux disease)     History of claustrophobia     IF SOMEONE TOUCHES HIS NOSE    Hypertension     Irregular heartbeat     Ischemic cardiomyopathy     MI (myocardial infarction) 2009    multiple MI's    Multiple gastric ulcers     Pacemaker     PAD (peripheral artery disease)     Presence of automatic implantable cardioverter-defibrillator     PVD (peripheral vascular disease)      Past Surgical History:   Procedure Laterality Date    ANGIOPLASTY Bilateral     right  and left iliac stents    AORTA - BILATERAL FEMORAL ARTERY BYPASS GRAFT  07/09/2013    APPENDECTOMY      ARTERIAL THROMBECTOMY Right 02/16/2012    iliac artery    ARTERIAL THROMBECTOMY Left 02/23/2012    brachial artery    CARDIAC DEFIBRILLATOR PLACEMENT Right 10/23/2012    [employer]    CORONARY ANGIOPLASTY WITH STENT PLACEMENT      CORONARY ARTERY BYPASS GRAFT  2011    x3    CREATION OF " FEMORAL-FEMORAL ARTERY BYPASS WITH GRAFT N/A 3/27/2019    Procedure: CREATION, BYPASS, ARTERIAL, FEMORAL TO FEMORAL, USING GRAFT - RIGHT TO LEFT;  Surgeon: Efren Fung MD;  Location: [facility] OR;  Service: Cardiothoracic;  Laterality: N/A;    ENDARTERECTOMY OF FEMORAL ARTERY Right 3/27/2019    Procedure: ENDARTERECTOMY, FEMORAL;  Surgeon: Efren Fung MD;  Location: [facility] OR;  Service: Cardiothoracic;  Laterality: Right;    EXPLORATION OF FEMORAL ARTERY Left 6/27/2019    Procedure: EXPLORATION, ARTERY, FEMORAL with repair;  Surgeon: Naresh Randolph MD;  Location: [facility] OR;  Service: Cardiovascular;  Laterality: Left;    FINGER SURGERY      amputation left middle finger    PERCUTANEOUS TRANSLUMINAL ANGIOPLASTY (PTA) OF PERIPHERAL VESSEL N/A 1/9/2019    Procedure: PTA, PERIPHERAL VESSEL;  Surgeon: Alex Pittman MD;  Location: [facility] CATH;  Service: Cardiology;  Laterality: N/A;  administer lytic therapy overnight on Wednesday with then plans to touch up other vessels Thursday morning January 10,2019    PERCUTANEOUS TRANSLUMINAL ANGIOPLASTY (PTA) OF PERIPHERAL VESSEL N/A 6/27/2019    Procedure: PTA, PERIPHERAL VESSEL;  Surgeon: Alex Pittman MD;  Location: [facility] CATH;  Service: Cardiology;  Laterality: N/A;    PERCUTANEOUS TRANSLUMINAL ANGIOPLASTY (PTA) OF PERIPHERAL VESSEL N/A 1/30/2020    Procedure: PTA, PERIPHERAL VESSEL;  Surgeon: Alex Pittman MD;  Location: [facility] CATH;  Service: Cardiology;  Laterality: N/A;    WOUND EXPLORATION Left 6/27/2019    Procedure: EXPLORATION, WOUND  Foreign body left femoral;  Surgeon: Naresh Randolph MD;  Location: [facility] OR;  Service: Cardiovascular;  Laterality: Left;     Family History   Problem Relation Age of Onset    Cancer Mother         bone    Heart attacks under age 50 Brother     Diabetes Daughter     Hypertension Father     Heart disease Father     Hyperlipidemia Father     Heart attacks under age 50 Paternal Grandfather      Social History      Tobacco Use    Smoking status: Current Every Day Smoker     Packs/day: 1.00     Years: 40.00     Pack years: 40.00     Types: Cigarettes     Start date: 1978    Smokeless tobacco: Never Used   Substance Use Topics    Alcohol use: Not Currently    Drug use: No     Review of Systems   Constitutional: Negative for fever.   HENT: Negative for sore throat.    Respiratory: Negative for shortness of breath.    Cardiovascular: Negative for chest pain.   Gastrointestinal: Negative for nausea.   Genitourinary: Negative for dysuria.   Musculoskeletal: Negative for back pain.   Skin: Negative for rash.   Neurological: Positive for weakness.   Hematological: Does not bruise/bleed easily.       Physical Exam     Initial Vitals [05/21/20 0946]   BP Pulse Resp Temp SpO2   115/74 86 18 97.3 °F (36.3 °C) 97 %      MAP       --         Physical Exam    Nursing note and vitals reviewed.  Constitutional: He appears well-developed and well-nourished.   HENT:   Head: Normocephalic and atraumatic.   Eyes: EOM are normal. Pupils are equal, round, and reactive to light.   Neck: Normal range of motion. Neck supple.   Cardiovascular: Normal rate.   Pulmonary/Chest: Breath sounds normal. No respiratory distress. He has no wheezes.   Abdominal: Soft. He exhibits no distension. There is no tenderness.   Musculoskeletal: Normal range of motion.   Neurological: He is alert and oriented to person, place, and time.   Skin: Skin is warm and dry.   Psychiatric: He has a normal mood and affect. Thought content normal.         ED Course   Procedures  Labs Reviewed   POCT GLUCOSE - Abnormal; Notable for the following components:       Result Value    POCT Glucose 477 (*)     All other components within normal limits   POCT GLUCOSE MONITORING CONTINUOUS          Imaging Results    None                                          Clinical Impression:       ICD-10-CM ICD-9-CM   1. Hyperglycemia R73.9 790.29         Disposition:   Disposition:  Discharged  Condition: Stable                        Heraclio Tang Jr., MD  05/21/20 1155

## 2020-05-21 NOTE — ED TRIAGE NOTES
"54 y.o. male presents to ER ED 01/ED 01B   Chief Complaint   Patient presents with    Hyperglycemia     pt states he ran out of his diabetes medications a " couple months ago" and this blood sugar was 572 this morning   . No acute distress noted.  "

## 2020-05-21 NOTE — DISCHARGE INSTRUCTIONS
"  Diabetes with High Blood Sugar  You have been treated for high blood sugar (hyperglycemia). This may be because of an infection or other illness, eating too many sweets or starches, or not taking enough insulin.  Home care  Monitor and write down your blood sugar level at least twice a day. Do this before breakfast and before dinner. If you take insulin, record your routine insulin dose as well. Also record any additional doses required based on your sliding scale. Do this for the next 3 to 5 days.  High blood sugar may cause symptoms that you can learn to recognize, such as:  · Frequent urination  · Thirst  · Dizziness  · Headache  · Shortness of breath  · Breath that smells fruity  · Nausea or vomiting  · Abdominal pain  · Drowsiness or loss of consciousness  If you have high-blood-sugar symptoms, use a blood or urine test to find out what your blood sugar level is. If it is above your usual range, use the "sliding scale" regular insulin dose prescribed by your healthcare provider. If you were not given a range for your insulin dose, contact your healthcare provider for more advice.  Follow-up care  Follow up with your healthcare provider, or as advised. You may need to meet with your healthcare provider in the next week. You will likely review your blood sugar records together. You may also talk to your provider about adjusting your dose of insulin or other medicine for blood sugar.  When to seek medical advice  Call your healthcare provider right away if these occur:  · High blood sugar symptoms (Symptoms are described above.)  · Blood sugar over 300 mg/dl If you cant reach your healthcare provider, go to a hospital emergency room or urgent care center  Date Last Reviewed: 6/1/2016  © 1541-4581 [email or web address]. [address] [place] [address], [place] [address]. All rights reserved. This information is not intended as a substitute for professional medical care. Always follow your healthcare professional's " instructions.

## 2020-05-26 ENCOUNTER — OFFICE VISIT (OUTPATIENT)
Dept: INTERNAL MEDICINE | Facility: CLINIC | Age: 55
End: 2020-05-26
Payer: MEDICARE

## 2020-05-26 VITALS
HEIGHT: 66 IN | WEIGHT: 164.44 LBS | SYSTOLIC BLOOD PRESSURE: 124 MMHG | TEMPERATURE: 98 F | HEART RATE: 91 BPM | DIASTOLIC BLOOD PRESSURE: 70 MMHG | RESPIRATION RATE: 20 BRPM | BODY MASS INDEX: 26.43 KG/M2 | OXYGEN SATURATION: 95 %

## 2020-05-26 DIAGNOSIS — I50.22 CHRONIC SYSTOLIC CONGESTIVE HEART FAILURE: ICD-10-CM

## 2020-05-26 DIAGNOSIS — Z72.0 TOBACCO ABUSE: ICD-10-CM

## 2020-05-26 DIAGNOSIS — E11.65 TYPE 2 DIABETES MELLITUS WITH HYPERGLYCEMIA, WITHOUT LONG-TERM CURRENT USE OF INSULIN: ICD-10-CM

## 2020-05-26 DIAGNOSIS — I25.119 CORONARY ARTERY DISEASE INVOLVING NATIVE CORONARY ARTERY OF NATIVE HEART WITH ANGINA PECTORIS: ICD-10-CM

## 2020-05-26 DIAGNOSIS — I73.9 PAD (PERIPHERAL ARTERY DISEASE): ICD-10-CM

## 2020-05-26 DIAGNOSIS — Z12.5 PROSTATE CANCER SCREENING: ICD-10-CM

## 2020-05-26 DIAGNOSIS — R91.1 PULMONARY NODULE, RIGHT: ICD-10-CM

## 2020-05-26 DIAGNOSIS — E78.5 HYPERLIPIDEMIA, UNSPECIFIED HYPERLIPIDEMIA TYPE: ICD-10-CM

## 2020-05-26 DIAGNOSIS — Z12.11 COLON CANCER SCREENING: ICD-10-CM

## 2020-05-26 DIAGNOSIS — Z76.89 ENCOUNTER TO ESTABLISH CARE: Primary | ICD-10-CM

## 2020-05-26 DIAGNOSIS — Z11.59 NEED FOR HEPATITIS C SCREENING TEST: ICD-10-CM

## 2020-05-26 PROBLEM — R05.9 COUGH: Status: RESOLVED | Noted: 2019-06-29 | Resolved: 2020-05-26

## 2020-05-26 PROCEDURE — 99499 RISK ADDL DX/OHS AUDIT: ICD-10-PCS | Mod: HCNC,S$GLB,, | Performed by: INTERNAL MEDICINE

## 2020-05-26 PROCEDURE — 3008F PR BODY MASS INDEX (BMI) DOCUMENTED: ICD-10-PCS | Mod: HCNC,CPTII,S$GLB, | Performed by: INTERNAL MEDICINE

## 2020-05-26 PROCEDURE — 99999 PR PBB SHADOW E&M-EST. PATIENT-LVL IV: ICD-10-PCS | Mod: PBBFAC,HCNC,, | Performed by: INTERNAL MEDICINE

## 2020-05-26 PROCEDURE — 99204 PR OFFICE/OUTPT VISIT, NEW, LEVL IV, 45-59 MIN: ICD-10-PCS | Mod: HCNC,S$GLB,, | Performed by: INTERNAL MEDICINE

## 2020-05-26 PROCEDURE — 3008F BODY MASS INDEX DOCD: CPT | Mod: HCNC,CPTII,S$GLB, | Performed by: INTERNAL MEDICINE

## 2020-05-26 PROCEDURE — 99999 PR PBB SHADOW E&M-EST. PATIENT-LVL IV: CPT | Mod: PBBFAC,HCNC,, | Performed by: INTERNAL MEDICINE

## 2020-05-26 PROCEDURE — 99204 OFFICE O/P NEW MOD 45 MIN: CPT | Mod: HCNC,S$GLB,, | Performed by: INTERNAL MEDICINE

## 2020-05-26 PROCEDURE — 99499 UNLISTED E&M SERVICE: CPT | Mod: HCNC,S$GLB,, | Performed by: INTERNAL MEDICINE

## 2020-05-26 NOTE — PROGRESS NOTES
"Subjective:       Patient ID: Miles Currie is a 54 y.o. male.    Chief Complaint: Establish Care and Diabetes      HPI:  Patient is new to clinic and presents to [facility]. He has systolic CHF with ICD, CAD s/p 3v CABG, HTN, HLD, systolic CHF, PAD s/p aortibifem by pass and multiple peripheral interventions (last 1/2020 mechanical thrombectomyto fem-fem graft) and DM type 2.    DM type 2: Last A1C 8.4% 3/2019. Reports was started on pills at that time but he never follow up with a PCP. He has been off medications for several months. He checked his blood sugar at home and was > 500 and came to ER 5/21/2020 for treatment. Treated with insulin and IVF and discharged home on metformin 500mg BID--has not check blood sugar since then. This AM, non-fasting, is 240s. He reports feeling better on medications. No polyuria, polydipsia.     PAD: discharge summary from Dr. Pittman 1/2020 reviewed. Appears to have severe PAD. Per Dr. Pitmtan on eliquis, plavix, ASA. He is still smoking, not ready to quit. No current claudication sx. Distal right 3rd finger amputated after infection.     HLD: on atorvastatin 80mg Unsure his current LDL; last in our system 2011 was 161. Labs done with Dr. Reyes of [facility]. Denies medication side effects and reports compliance.     CHF: "my heart only functions at 11%"; I have no prior TTE in our system. He has a ICD in place. He typically follows with Dr. Reyes but has not seen him in "several months". He denies PIERRE, LE edema. Does not take diuretics. On Entresto and Coreg.     CAD: s/p 3v CABG and 3 stents s/p MI. On plavix, ASA, atorvastatin, and BB. Unsure when last stress test done.     He is having chest pains on left side of chest. Associated with SOB. Described as sharp pain. Lasts 10-20 minutes. Makes him feel like he has to urinate and when he urinates feels like he might pass out. Denies actual syncope. Feels like a "hot flash" and heart "beating too fast". He has ICD and felt a shock 3 " days ago--first time he was shocked. Did not go to hospital for eval. Electrolytes 5/21/2020 normal other than mild hypoNa from hyperglycemia.     Past Medical History:   Diagnosis Date    Anticoagulant long-term use     plavix and aspiin, pletal    Anxiety and depression     Bipolar disorder     CHF (congestive heart failure)     Coronary artery disease     Diabetes mellitus     Dyslipidemia     Encounter for blood transfusion     GERD (gastroesophageal reflux disease)     History of claustrophobia     IF SOMEONE TOUCHES HIS NOSE    Hypertension     Irregular heartbeat     Ischemic cardiomyopathy     MI (myocardial infarction) 2009    multiple MI's    Multiple gastric ulcers     Pacemaker     PAD (peripheral artery disease)     Presence of automatic implantable cardioverter-defibrillator     PVD (peripheral vascular disease)        Family History   Problem Relation Age of Onset    Cancer Mother         bone    Heart attacks under age 50 Brother     Diabetes Daughter     Hypertension Father     Heart disease Father     Hyperlipidemia Father     Heart attacks under age 50 Paternal Grandfather        Social History     Socioeconomic History    Marital status: Legally      Spouse name: Not on file    Number of children: Not on file    Years of education: Not on file    Highest education level: Not on file   Occupational History    Not on file   Social Needs    Financial resource strain: Not on file    Food insecurity:     Worry: Not on file     Inability: Not on file    Transportation needs:     Medical: Not on file     Non-medical: Not on file   Tobacco Use    Smoking status: Current Every Day Smoker     Packs/day: 1.00     Years: 40.00     Pack years: 40.00     Types: Cigarettes     Start date: 1978    Smokeless tobacco: Never Used   Substance and Sexual Activity    Alcohol use: Not Currently    Drug use: No    Sexual activity: Yes   Lifestyle    Physical activity:      Days per week: Not on file     Minutes per session: Not on file    Stress: Not on file   Relationships    Social connections:     Talks on phone: Not on file     Gets together: Not on file     Attends Yazdanism service: Not on file     Active member of club or organization: Not on file     Attends meetings of clubs or organizations: Not on file     Relationship status: Not on file   Other Topics Concern    Not on file   Social History Narrative    Not on file       Review of Systems   Constitutional: Negative for activity change, fatigue, fever and unexpected weight change.   HENT: Negative for congestion, ear pain, hearing loss, rhinorrhea and sore throat.    Eyes: Negative for redness and visual disturbance.   Respiratory: Positive for chest tightness and shortness of breath. Negative for cough and wheezing.    Cardiovascular: Positive for chest pain and palpitations. Negative for leg swelling.   Gastrointestinal: Negative for abdominal pain, constipation, diarrhea, nausea and vomiting.   Genitourinary: Negative for decreased urine volume, dysuria, frequency and urgency.   Musculoskeletal: Negative for back pain, joint swelling and neck pain.   Skin: Negative for color change, rash and wound.   Neurological: Positive for syncope (near syncope). Negative for dizziness, tremors, weakness, light-headedness and headaches.         Objective:      Physical Exam   Constitutional: He is oriented to person, place, and time. He appears well-developed and well-nourished. No distress.   HENT:   Head: Normocephalic and atraumatic.   Right Ear: External ear normal.   Left Ear: External ear normal.   Eyes: Pupils are equal, round, and reactive to light. Conjunctivae and EOM are normal. Right eye exhibits no discharge. Left eye exhibits no discharge.   Neck: Neck supple. No tracheal deviation present.   Cardiovascular: Normal rate and regular rhythm.   No murmur heard.  Pulmonary/Chest: Effort normal and breath sounds normal.  No respiratory distress. He has no wheezes. He has no rales.   Abdominal: Soft. Bowel sounds are normal. He exhibits no distension. There is no tenderness.   Musculoskeletal: He exhibits no edema.   Neurological: He is alert and oriented to person, place, and time. No cranial nerve deficit.   Skin: Skin is warm and dry.   Psychiatric: He has a normal mood and affect. His behavior is normal.   Vitals reviewed.      Assessment:       1. Encounter to establish care    2. Chronic systolic congestive heart failure    3. PAD (peripheral artery disease)    4. Coronary artery disease involving native coronary artery of native heart with angina pectoris    5. Type 2 diabetes mellitus with hyperglycemia, without long-term current use of insulin    6. Hyperlipidemia, unspecified hyperlipidemia type    7. Tobacco abuse    8. Pulmonary nodule, right    9. Prostate cancer screening    10. Colon cancer screening    11. Need for hepatitis C screening test        Plan:       Miles was seen today for establish care and diabetes.    Diagnoses and all orders for this visit:    Encounter to establish care  -     CBC auto differential; Future  -     Comprehensive metabolic panel; Future  -     TSH; Future  -     Lipid Panel; Future  -     T4, free; Future  -     Hemoglobin A1C; Future  -     Microalbumin/creatinine urine ratio; Future  meds reconciled  Problem list reviewed and updated  D/c summary 1/2020 reviewed  Labs 5/21/2020 reviewed  Imaging reviewed (Ct 6/2019)    Chronic systolic congestive heart failure  -     CBC auto differential; Future  -     Comprehensive metabolic panel; Future  -     TSH; Future  -     Lipid Panel; Future  -     T4, free; Future  -     Hemoglobin A1C; Future  No signs of volume overload  Will get records with most recent TTE to assess EF but ICD so suspect < 30%  Cont BB and entresto per cardiology  He felt ICD shock 3 days ago----highly concerning to me and discussed with the patient. I am trying to  "get him in with cardiology ASAP for eval. He is currently asymptomatic but if occurs again advised to call 911. Voiced understanding  He is having near syncope with palpitations and chest pain. Concerning for VT/VF and/or worsening angina pectoris. Again asymptomatic right now and can't admit this complex cardiac patient to [facility] but strongly advised needs follow up NOW AND if pain and sx reoccur to go to ER.     PAD (peripheral artery disease)  -     CBC auto differential; Future  -     Comprehensive metabolic panel; Future  -     TSH; Future  -     Lipid Panel; Future  -     T4, free; Future  -     Hemoglobin A1C; Future  Chronic stable  Last intervention 1/2020; sees Dr. Pittman  Cont meds per Dr. Pittman  No abnormal bleeding on triple antiplatelet    Coronary artery disease involving native coronary artery of native heart with angina pectoris  -     CBC auto differential; Future  -     Comprehensive metabolic panel; Future  -     TSH; Future  -     Lipid Panel; Future  -     T4, free; Future  -     Hemoglobin A1C; Future  Chronic, ?unstable  Having worsening chest pains concerning for angina  Occurring more frequently. ?unstable angina vs arrythmia causing palpitations  Advised urgent eval. "I'm already on borrowed time". "I don't like hospitals". Trying to get him in with his cardiologist ASAP  Cont ASA, plavix, statin, BB  Getting cardiology records to review for his chart here as well    Type 2 diabetes mellitus with hyperglycemia, without long-term current use of insulin  -     CBC auto differential; Future  -     Comprehensive metabolic panel; Future  -     TSH; Future  -     Lipid Panel; Future  -     T4, free; Future  -     Hemoglobin A1C; Future  -     Microalbumin/creatinine urine ratio; Future  -     POCT Glucose, Hand-Held Device  Chronic uncontrolled  Cont metformin  Better controlled with nonfasting blood sugars 250s from 400-500  Though not optimal his bigger issue today is cardiac  Will do " fasting labs and adjust meds from there  1800 tamar ADA diet  Check sugars and keep log    Hyperlipidemia, unspecified hyperlipidemia type  -     CBC auto differential; Future  -     Comprehensive metabolic panel; Future  -     Lipid Panel; Future  Chronic stable  Getting labs  Cont statin pending labs    Tobacco abuse  -     CBC auto differential; Future  -     Comprehensive metabolic panel; Future  -     TSH; Future  -     Lipid Panel; Future  -     T4, free; Future  -     Hemoglobin A1C; Future  Cessation discussed  Consider wellbutrin as also reports has some anxiety issues    Pulmonary nodule, right  -     CT Chest Without Contrast; Future  Noted CT 2019 and never had follow up scan  CXR 2020 did not see the nodule but this is not optimal scan  Agreeable to repeat CT chest  Mother  of lung cancer; not sure he would want treatment anyway    Prostate cancer screening  -     PSA, Screening; Future    Colon cancer screening  -     Cologuard Screening (Multitarget Stool DNA); Future  -     Cologuard Screening (Multitarget Stool DNA)    Need for hepatitis C screening test  -     Hepatitis C Antibody; Future      RTC as scheduled and PRN. Working on getting him in with cardiology ASAP and strict ER precautions discussed today

## 2020-06-10 RX ORDER — METFORMIN HYDROCHLORIDE 500 MG/1
500 TABLET ORAL 2 TIMES DAILY WITH MEALS
Qty: 60 TABLET | Refills: 2 | Status: SHIPPED | OUTPATIENT
Start: 2020-06-10 | End: 2020-06-22

## 2020-06-10 NOTE — TELEPHONE ENCOUNTER
----- Message from Blanca Espinosa sent at 6/10/2020  9:14 AM CDT -----  Contact: Self  Miles Currie  MRN: [id number]  : 1965  PCP: Adela Gifford  Home Phone      [phone]  Work Phone      Not on file.  Mobile          [phone]  Home Phone      [phone]      MESSAGE:   Refill  metFORMIN (GLUCOPHAGE) 500 MG tablet    Pharmacy: [facility], [place] [address] [address]    Phone: [phone]

## 2020-06-10 NOTE — TELEPHONE ENCOUNTER
Pharmacy and pt never received the refill you sent in last time because it was set to print. Patient has been out of medication for a couple days. Please advise

## 2020-06-15 ENCOUNTER — HOSPITAL ENCOUNTER (EMERGENCY)
Facility: HOSPITAL | Age: 55
Discharge: HOME | End: 2020-06-15
Attending: SURGERY
Payer: MEDICARE

## 2020-06-15 VITALS
BODY MASS INDEX: 26.3 KG/M2 | DIASTOLIC BLOOD PRESSURE: 70 MMHG | RESPIRATION RATE: 18 BRPM | WEIGHT: 162.94 LBS | OXYGEN SATURATION: 97 % | TEMPERATURE: 97 F | HEART RATE: 88 BPM | SYSTOLIC BLOOD PRESSURE: 109 MMHG

## 2020-06-15 DIAGNOSIS — K21.9 GERD (GASTROESOPHAGEAL REFLUX DISEASE): ICD-10-CM

## 2020-06-15 DIAGNOSIS — E11.65 UNCONTROLLED TYPE 2 DIABETES MELLITUS WITH HYPERGLYCEMIA: Primary | ICD-10-CM

## 2020-06-15 LAB
ALBUMIN SERPL BCP-MCNC: 4.1 G/DL (ref 3.5–5.2)
ALP SERPL-CCNC: 195 U/L (ref 55–135)
ALT SERPL W/O P-5'-P-CCNC: 52 U/L (ref 10–44)
AMPHET+METHAMPHET UR QL: ABNORMAL
ANION GAP SERPL CALC-SCNC: 11 MMOL/L (ref 8–16)
ANION GAP SERPL CALC-SCNC: 9 MMOL/L (ref 8–16)
AST SERPL-CCNC: 57 U/L (ref 10–40)
B-OH-BUTYR BLD STRIP-SCNC: 0.2 MMOL/L (ref 0–0.5)
BARBITURATES UR QL SCN>200 NG/ML: NEGATIVE
BASOPHILS # BLD AUTO: 0.04 K/UL (ref 0–0.2)
BASOPHILS NFR BLD: 0.5 % (ref 0–1.9)
BENZODIAZ UR QL SCN>200 NG/ML: NEGATIVE
BILIRUB SERPL-MCNC: 1.3 MG/DL (ref 0.1–1)
BILIRUB UR QL STRIP: NEGATIVE
BUN SERPL-MCNC: 11 MG/DL (ref 6–20)
BUN SERPL-MCNC: 12 MG/DL (ref 6–20)
BZE UR QL SCN: NEGATIVE
CALCIUM SERPL-MCNC: 8.9 MG/DL (ref 8.7–10.5)
CALCIUM SERPL-MCNC: 9.7 MG/DL (ref 8.7–10.5)
CANNABINOIDS UR QL SCN: ABNORMAL
CHLORIDE SERPL-SCNC: 107 MMOL/L (ref 95–110)
CHLORIDE SERPL-SCNC: 99 MMOL/L (ref 95–110)
CK MB SERPL-MCNC: 3.3 NG/ML (ref 0.1–6.5)
CK MB SERPL-MCNC: 3.8 NG/ML (ref 0.1–6.5)
CK MB SERPL-RTO: 3.6 % (ref 0–5)
CK MB SERPL-RTO: 3.9 % (ref 0–5)
CK SERPL-CCNC: 107 U/L (ref 20–200)
CK SERPL-CCNC: 85 U/L (ref 20–200)
CK SERPL-CCNC: 85 U/L (ref 20–200)
CLARITY UR: CLEAR
CO2 SERPL-SCNC: 24 MMOL/L (ref 23–29)
CO2 SERPL-SCNC: 25 MMOL/L (ref 23–29)
COLOR UR: YELLOW
CREAT SERPL-MCNC: 0.9 MG/DL (ref 0.5–1.4)
CREAT SERPL-MCNC: 1.3 MG/DL (ref 0.5–1.4)
CREAT UR-MCNC: 17.9 MG/DL (ref 23–375)
DIFFERENTIAL METHOD: ABNORMAL
EOSINOPHIL # BLD AUTO: 0.1 K/UL (ref 0–0.5)
EOSINOPHIL NFR BLD: 1.8 % (ref 0–8)
ERYTHROCYTE [DISTWIDTH] IN BLOOD BY AUTOMATED COUNT: 13.2 % (ref 11.5–14.5)
EST. GFR  (AFRICAN AMERICAN): >60 ML/MIN/1.73 M^2
EST. GFR  (AFRICAN AMERICAN): >60 ML/MIN/1.73 M^2
EST. GFR  (NON AFRICAN AMERICAN): >60 ML/MIN/1.73 M^2
EST. GFR  (NON AFRICAN AMERICAN): >60 ML/MIN/1.73 M^2
ESTIMATED AVG GLUCOSE: 312 MG/DL (ref 68–131)
GLUCOSE SERPL-MCNC: 158 MG/DL (ref 70–110)
GLUCOSE SERPL-MCNC: 480 MG/DL (ref 70–110)
GLUCOSE UR QL STRIP: ABNORMAL
HBA1C MFR BLD HPLC: 12.5 % (ref 4–5.6)
HCT VFR BLD AUTO: 47.4 % (ref 40–54)
HGB BLD-MCNC: 14.9 G/DL (ref 14–18)
HGB UR QL STRIP: NEGATIVE
IMM GRANULOCYTES # BLD AUTO: 0.02 K/UL (ref 0–0.04)
IMM GRANULOCYTES NFR BLD AUTO: 0.3 % (ref 0–0.5)
KETONES UR QL STRIP: NEGATIVE
LEUKOCYTE ESTERASE UR QL STRIP: NEGATIVE
LYMPHOCYTES # BLD AUTO: 1.6 K/UL (ref 1–4.8)
LYMPHOCYTES NFR BLD: 21.4 % (ref 18–48)
MCH RBC QN AUTO: 29.6 PG (ref 27–31)
MCHC RBC AUTO-ENTMCNC: 31.4 G/DL (ref 32–36)
MCV RBC AUTO: 94 FL (ref 82–98)
METHADONE UR QL SCN>300 NG/ML: NEGATIVE
MONOCYTES # BLD AUTO: 0.4 K/UL (ref 0.3–1)
MONOCYTES NFR BLD: 4.8 % (ref 4–15)
NEUTROPHILS # BLD AUTO: 5.2 K/UL (ref 1.8–7.7)
NEUTROPHILS NFR BLD: 71.2 % (ref 38–73)
NITRITE UR QL STRIP: NEGATIVE
NRBC BLD-RTO: 0 /100 WBC
OPIATES UR QL SCN: NEGATIVE
PCP UR QL SCN>25 NG/ML: NEGATIVE
PH UR STRIP: 5 [PH] (ref 5–8)
PLATELET # BLD AUTO: 181 K/UL (ref 150–350)
PMV BLD AUTO: 10 FL (ref 9.2–12.9)
POCT GLUCOSE: 134 MG/DL (ref 70–110)
POTASSIUM SERPL-SCNC: 3.6 MMOL/L (ref 3.5–5.1)
POTASSIUM SERPL-SCNC: 4.1 MMOL/L (ref 3.5–5.1)
PROT SERPL-MCNC: 7.4 G/DL (ref 6–8.4)
PROT UR QL STRIP: NEGATIVE
RBC # BLD AUTO: 5.04 M/UL (ref 4.6–6.2)
SODIUM SERPL-SCNC: 135 MMOL/L (ref 136–145)
SODIUM SERPL-SCNC: 140 MMOL/L (ref 136–145)
SP GR UR STRIP: 1.01 (ref 1–1.03)
TOXICOLOGY INFORMATION: ABNORMAL
TROPONIN I SERPL DL<=0.01 NG/ML-MCNC: 0.05 NG/ML (ref 0–0.03)
TROPONIN I SERPL DL<=0.01 NG/ML-MCNC: 0.05 NG/ML (ref 0–0.03)
URN SPEC COLLECT METH UR: ABNORMAL
UROBILINOGEN UR STRIP-ACNC: NEGATIVE EU/DL
WBC # BLD AUTO: 7.3 K/UL (ref 3.9–12.7)

## 2020-06-15 PROCEDURE — 96374 THER/PROPH/DIAG INJ IV PUSH: CPT | Mod: HCNC

## 2020-06-15 PROCEDURE — 25000003 PHARM REV CODE 250: Mod: HCNC | Performed by: SURGERY

## 2020-06-15 PROCEDURE — 83036 HEMOGLOBIN GLYCOSYLATED A1C: CPT | Mod: HCNC

## 2020-06-15 PROCEDURE — 29130 APPL FINGER SPLINT STATIC: CPT | Mod: HCNC

## 2020-06-15 PROCEDURE — 36415 COLL VENOUS BLD VENIPUNCTURE: CPT | Mod: HCNC

## 2020-06-15 PROCEDURE — 99285 EMERGENCY DEPT VISIT HI MDM: CPT | Mod: 25,HCNC

## 2020-06-15 PROCEDURE — 84484 ASSAY OF TROPONIN QUANT: CPT | Mod: 91,HCNC

## 2020-06-15 PROCEDURE — 93010 ELECTROCARDIOGRAM REPORT: CPT | Mod: HCNC,,, | Performed by: INTERNAL MEDICINE

## 2020-06-15 PROCEDURE — 81003 URINALYSIS AUTO W/O SCOPE: CPT | Mod: HCNC,59

## 2020-06-15 PROCEDURE — 93005 ELECTROCARDIOGRAM TRACING: CPT | Mod: HCNC

## 2020-06-15 PROCEDURE — 93010 EKG 12-LEAD: ICD-10-PCS | Mod: HCNC,,, | Performed by: INTERNAL MEDICINE

## 2020-06-15 PROCEDURE — 85025 COMPLETE CBC W/AUTO DIFF WBC: CPT | Mod: HCNC

## 2020-06-15 PROCEDURE — 63600175 PHARM REV CODE 636 W HCPCS: Mod: HCNC | Performed by: SURGERY

## 2020-06-15 PROCEDURE — 82550 ASSAY OF CK (CPK): CPT | Mod: 91,HCNC

## 2020-06-15 PROCEDURE — 82010 KETONE BODYS QUAN: CPT | Mod: HCNC

## 2020-06-15 PROCEDURE — 96361 HYDRATE IV INFUSION ADD-ON: CPT | Mod: HCNC

## 2020-06-15 PROCEDURE — 80053 COMPREHEN METABOLIC PANEL: CPT | Mod: HCNC

## 2020-06-15 PROCEDURE — 80048 BASIC METABOLIC PNL TOTAL CA: CPT | Mod: HCNC

## 2020-06-15 PROCEDURE — 80307 DRUG TEST PRSMV CHEM ANLYZR: CPT | Mod: HCNC

## 2020-06-15 PROCEDURE — 82553 CREATINE MB FRACTION: CPT | Mod: HCNC

## 2020-06-15 RX ORDER — SODIUM CHLORIDE 9 MG/ML
1000 INJECTION, SOLUTION INTRAVENOUS
Status: COMPLETED | OUTPATIENT
Start: 2020-06-15 | End: 2020-06-15

## 2020-06-15 RX ORDER — PANTOPRAZOLE SODIUM 40 MG/1
40 TABLET, DELAYED RELEASE ORAL DAILY
Qty: 30 TABLET | Refills: 0 | Status: SHIPPED | OUTPATIENT
Start: 2020-06-15 | End: 2020-09-22

## 2020-06-15 RX ORDER — SUCRALFATE 1 G/1
1 TABLET ORAL 4 TIMES DAILY
Qty: 120 TABLET | Refills: 0 | Status: SHIPPED | OUTPATIENT
Start: 2020-06-15 | End: 2020-07-15

## 2020-06-15 RX ADMIN — SODIUM CHLORIDE 1000 ML: 0.9 INJECTION, SOLUTION INTRAVENOUS at 10:06

## 2020-06-15 RX ADMIN — INSULIN HUMAN 8 UNITS: 100 INJECTION, SOLUTION PARENTERAL at 10:06

## 2020-06-15 RX ADMIN — SODIUM CHLORIDE 500 ML: 0.9 INJECTION, SOLUTION INTRAVENOUS at 10:06

## 2020-06-15 NOTE — ED NOTES
The patient is awake, alert, normal respiratory effort and rate noted, full ROM in all extremities, resting comfortably. No change from previous assessment, bed in low, locked position, SR x1, pt able to change position independently. Will continue to monitor.

## 2020-06-15 NOTE — ED TRIAGE NOTES
54 y.o. male presents to ER    Chief Complaint   Patient presents with    Hyperglycemia   pt c/o hyperglycemia, reports blood sugar was 450 at home, states he took his medications this morning. No acute distress noted.

## 2020-06-15 NOTE — ED NOTES
The patient is awake, alert, aware of environment. Airway is open and patent, respirations are spontaneous, normal respiratory effort and rate noted, full ROM in all extremities, no distress noted, resting comfortably. Bed in low, locked position, SR x1, pt able to change position independently. Will continue to monitor.

## 2020-06-16 ENCOUNTER — TELEPHONE (OUTPATIENT)
Dept: INTERNAL MEDICINE | Facility: CLINIC | Age: 55
End: 2020-06-16

## 2020-06-16 ENCOUNTER — HOSPITAL ENCOUNTER (EMERGENCY)
Facility: HOSPITAL | Age: 55
Discharge: HOME | End: 2020-06-16
Attending: SURGERY
Payer: MEDICARE

## 2020-06-16 VITALS
SYSTOLIC BLOOD PRESSURE: 132 MMHG | RESPIRATION RATE: 16 BRPM | HEART RATE: 76 BPM | WEIGHT: 159.5 LBS | TEMPERATURE: 98 F | DIASTOLIC BLOOD PRESSURE: 70 MMHG | BODY MASS INDEX: 25.74 KG/M2

## 2020-06-16 DIAGNOSIS — R07.9 CHEST PAIN: ICD-10-CM

## 2020-06-16 DIAGNOSIS — R73.9 HYPERGLYCEMIA: Primary | ICD-10-CM

## 2020-06-16 LAB
ALBUMIN SERPL BCP-MCNC: 3.6 G/DL (ref 3.5–5.2)
ALP SERPL-CCNC: 179 U/L (ref 55–135)
ALT SERPL W/O P-5'-P-CCNC: 46 U/L (ref 10–44)
ANION GAP SERPL CALC-SCNC: 12 MMOL/L (ref 8–16)
AST SERPL-CCNC: 25 U/L (ref 10–40)
BACTERIA #/AREA URNS HPF: NORMAL /HPF
BASOPHILS # BLD AUTO: 0.03 K/UL (ref 0–0.2)
BASOPHILS NFR BLD: 0.4 % (ref 0–1.9)
BILIRUB SERPL-MCNC: 1.6 MG/DL (ref 0.1–1)
BILIRUB UR QL STRIP: NEGATIVE
BUN SERPL-MCNC: 10 MG/DL (ref 6–20)
CALCIUM SERPL-MCNC: 9.3 MG/DL (ref 8.7–10.5)
CHLORIDE SERPL-SCNC: 100 MMOL/L (ref 95–110)
CK MB SERPL-MCNC: 2.8 NG/ML (ref 0.1–6.5)
CK MB SERPL-RTO: 4.4 % (ref 0–5)
CK SERPL-CCNC: 64 U/L (ref 20–200)
CK SERPL-CCNC: 64 U/L (ref 20–200)
CLARITY UR: CLEAR
CO2 SERPL-SCNC: 23 MMOL/L (ref 23–29)
COLOR UR: YELLOW
CREAT SERPL-MCNC: 1.1 MG/DL (ref 0.5–1.4)
DIFFERENTIAL METHOD: ABNORMAL
EOSINOPHIL # BLD AUTO: 0.1 K/UL (ref 0–0.5)
EOSINOPHIL NFR BLD: 1.5 % (ref 0–8)
ERYTHROCYTE [DISTWIDTH] IN BLOOD BY AUTOMATED COUNT: 13.2 % (ref 11.5–14.5)
EST. GFR  (AFRICAN AMERICAN): >60 ML/MIN/1.73 M^2
EST. GFR  (NON AFRICAN AMERICAN): >60 ML/MIN/1.73 M^2
GLUCOSE SERPL-MCNC: 458 MG/DL (ref 70–110)
GLUCOSE UR QL STRIP: ABNORMAL
HCT VFR BLD AUTO: 44.1 % (ref 40–54)
HGB BLD-MCNC: 14.4 G/DL (ref 14–18)
HGB UR QL STRIP: ABNORMAL
IMM GRANULOCYTES # BLD AUTO: 0.02 K/UL (ref 0–0.04)
IMM GRANULOCYTES NFR BLD AUTO: 0.3 % (ref 0–0.5)
KETONES UR QL STRIP: NEGATIVE
LEUKOCYTE ESTERASE UR QL STRIP: NEGATIVE
LYMPHOCYTES # BLD AUTO: 1.5 K/UL (ref 1–4.8)
LYMPHOCYTES NFR BLD: 19.9 % (ref 18–48)
MCH RBC QN AUTO: 30.1 PG (ref 27–31)
MCHC RBC AUTO-ENTMCNC: 32.7 G/DL (ref 32–36)
MCV RBC AUTO: 92 FL (ref 82–98)
MICROSCOPIC COMMENT: NORMAL
MONOCYTES # BLD AUTO: 0.4 K/UL (ref 0.3–1)
MONOCYTES NFR BLD: 4.8 % (ref 4–15)
NEUTROPHILS # BLD AUTO: 5.3 K/UL (ref 1.8–7.7)
NEUTROPHILS NFR BLD: 73.1 % (ref 38–73)
NITRITE UR QL STRIP: NEGATIVE
NRBC BLD-RTO: 0 /100 WBC
PH UR STRIP: 6 [PH] (ref 5–8)
PLATELET # BLD AUTO: 172 K/UL (ref 150–350)
PMV BLD AUTO: 10.3 FL (ref 9.2–12.9)
POCT GLUCOSE: 141 MG/DL (ref 70–110)
POCT GLUCOSE: 452 MG/DL (ref 70–110)
POCT GLUCOSE: 95 MG/DL (ref 70–110)
POTASSIUM SERPL-SCNC: 3.9 MMOL/L (ref 3.5–5.1)
PROT SERPL-MCNC: 6.8 G/DL (ref 6–8.4)
PROT UR QL STRIP: NEGATIVE
RBC # BLD AUTO: 4.79 M/UL (ref 4.6–6.2)
RBC #/AREA URNS HPF: 0 /HPF (ref 0–4)
SODIUM SERPL-SCNC: 135 MMOL/L (ref 136–145)
SP GR UR STRIP: 1.01 (ref 1–1.03)
TROPONIN I SERPL DL<=0.01 NG/ML-MCNC: 0.07 NG/ML (ref 0–0.03)
URN SPEC COLLECT METH UR: ABNORMAL
UROBILINOGEN UR STRIP-ACNC: NEGATIVE EU/DL
WBC # BLD AUTO: 7.3 K/UL (ref 3.9–12.7)
YEAST URNS QL MICRO: NORMAL

## 2020-06-16 PROCEDURE — 93010 ELECTROCARDIOGRAM REPORT: CPT | Mod: HCNC,,, | Performed by: INTERNAL MEDICINE

## 2020-06-16 PROCEDURE — 82550 ASSAY OF CK (CPK): CPT | Mod: HCNC

## 2020-06-16 PROCEDURE — 93005 ELECTROCARDIOGRAM TRACING: CPT | Mod: HCNC

## 2020-06-16 PROCEDURE — 36415 COLL VENOUS BLD VENIPUNCTURE: CPT | Mod: HCNC

## 2020-06-16 PROCEDURE — 99284 EMERGENCY DEPT VISIT MOD MDM: CPT | Mod: 25,HCNC

## 2020-06-16 PROCEDURE — 81000 URINALYSIS NONAUTO W/SCOPE: CPT | Mod: HCNC

## 2020-06-16 PROCEDURE — 93010 EKG 12-LEAD: ICD-10-PCS | Mod: HCNC,,, | Performed by: INTERNAL MEDICINE

## 2020-06-16 PROCEDURE — 85025 COMPLETE CBC W/AUTO DIFF WBC: CPT | Mod: HCNC

## 2020-06-16 PROCEDURE — 82962 GLUCOSE BLOOD TEST: CPT | Mod: HCNC

## 2020-06-16 PROCEDURE — 96361 HYDRATE IV INFUSION ADD-ON: CPT | Mod: HCNC

## 2020-06-16 PROCEDURE — 80053 COMPREHEN METABOLIC PANEL: CPT | Mod: HCNC

## 2020-06-16 PROCEDURE — 63600175 PHARM REV CODE 636 W HCPCS: Mod: HCNC | Performed by: SURGERY

## 2020-06-16 PROCEDURE — 96374 THER/PROPH/DIAG INJ IV PUSH: CPT | Mod: HCNC

## 2020-06-16 PROCEDURE — 82553 CREATINE MB FRACTION: CPT | Mod: HCNC

## 2020-06-16 PROCEDURE — 84484 ASSAY OF TROPONIN QUANT: CPT | Mod: HCNC

## 2020-06-16 PROCEDURE — 25000003 PHARM REV CODE 250: Mod: HCNC | Performed by: SURGERY

## 2020-06-16 RX ADMIN — INSULIN HUMAN 10 UNITS: 100 INJECTION, SOLUTION PARENTERAL at 01:06

## 2020-06-16 RX ADMIN — DICYCLOMINE HYDROCHLORIDE 50 ML: 10 SOLUTION ORAL at 01:06

## 2020-06-16 RX ADMIN — SODIUM CHLORIDE 1000 ML: 0.9 INJECTION, SOLUTION INTRAVENOUS at 12:06

## 2020-06-16 NOTE — ED PROVIDER NOTES
Encounter Date: 6/16/2020       History     Chief Complaint   Patient presents with    Hyperglycemia     Patient is 54-year-old white male who was seen in this ED yesterday for hyperglycemia with no evidence of DKA.  He checked his blood sugar this morning, noted 530 on the reading.  Presents for treatment recurrent hyperglycemia.  Infectious workup yesterday was unremarkable.        Review of patient's allergies indicates:   Allergen Reactions    Fish oil Swelling    Gabapentin Swelling     Past Medical History:   Diagnosis Date    Anticoagulant long-term use     plavix and aspiin, pletal    Anxiety and depression     Bipolar disorder     CHF (congestive heart failure)     Coronary artery disease     Diabetes mellitus     Dyslipidemia     Encounter for blood transfusion     GERD (gastroesophageal reflux disease)     History of claustrophobia     IF SOMEONE TOUCHES HIS NOSE    Hypertension     Irregular heartbeat     Ischemic cardiomyopathy     MI (myocardial infarction) 2009    multiple MI's    Multiple gastric ulcers     Pacemaker     PAD (peripheral artery disease)     Presence of automatic implantable cardioverter-defibrillator     PVD (peripheral vascular disease)      Past Surgical History:   Procedure Laterality Date    ANGIOPLASTY Bilateral     right  and left iliac stents    AORTA - BILATERAL FEMORAL ARTERY BYPASS GRAFT  07/09/2013    APPENDECTOMY      ARTERIAL THROMBECTOMY Right 02/16/2012    iliac artery    ARTERIAL THROMBECTOMY Left 02/23/2012    brachial artery    CARDIAC DEFIBRILLATOR PLACEMENT Right 10/23/2012    [employer]    CORONARY ANGIOPLASTY WITH STENT PLACEMENT      CORONARY ARTERY BYPASS GRAFT  2011    x3    CREATION OF FEMORAL-FEMORAL ARTERY BYPASS WITH GRAFT N/A 3/27/2019    Procedure: CREATION, BYPASS, ARTERIAL, FEMORAL TO FEMORAL, USING GRAFT - RIGHT TO LEFT;  Surgeon: Efren Fung MD;  Location: [facility];  Service: Cardiothoracic;  Laterality: N/A;     ENDARTERECTOMY OF FEMORAL ARTERY Right 3/27/2019    Procedure: ENDARTERECTOMY, FEMORAL;  Surgeon: Efren Fung MD;  [facility];  Service: Cardiothoracic;  Laterality: Right;    EXPLORATION OF FEMORAL ARTERY Left 6/27/2019    Procedure: EXPLORATION, ARTERY, FEMORAL with repair;  Surgeon: Naresh Randolph MD;  [facility];  Service: Cardiovascular;  Laterality: Left;    FINGER SURGERY      amputation left middle finger    PERCUTANEOUS TRANSLUMINAL ANGIOPLASTY (PTA) OF PERIPHERAL VESSEL N/A 1/9/2019    Procedure: PTA, PERIPHERAL VESSEL;  Surgeon: Alex Pittman MD;  [facility];  Service: Cardiology;  Laterality: N/A;  administer lytic therapy overnight on Wednesday with then plans to touch up other vessels Thursday morning January 10,2019    PERCUTANEOUS TRANSLUMINAL ANGIOPLASTY (PTA) OF PERIPHERAL VESSEL N/A 6/27/2019    Procedure: PTA, PERIPHERAL VESSEL;  Surgeon: Alex Pittman MD;  [facility];  Service: Cardiology;  Laterality: N/A;    PERCUTANEOUS TRANSLUMINAL ANGIOPLASTY (PTA) OF PERIPHERAL VESSEL N/A 1/30/2020    Procedure: PTA, PERIPHERAL VESSEL;  Surgeon: Alex Pittman MD;  [facility];  Service: Cardiology;  Laterality: N/A;    WOUND EXPLORATION Left 6/27/2019    Procedure: EXPLORATION, WOUND  Foreign body left femoral;  Surgeon: Naresh Randolph MD;  [facility];  Service: Cardiovascular;  Laterality: Left;     Family History   Problem Relation Age of Onset    Cancer Mother         bone    Heart attacks under age 50 Brother     Diabetes Daughter     Hypertension Father     Heart disease Father     Hyperlipidemia Father     Heart attacks under age 50 Paternal Grandfather      Social History     Tobacco Use    Smoking status: Current Every Day Smoker     Packs/day: 1.00     Years: 40.00     Pack years: 40.00     Types: Cigarettes     Start date: 1978    Smokeless tobacco: Never Used   Substance Use Topics    Alcohol use: Not  Currently    Drug use: No     Review of Systems   Constitutional: Negative for fever.   HENT: Negative for sore throat.    Respiratory: Negative for shortness of breath.    Cardiovascular: Negative for chest pain.   Gastrointestinal: Negative for abdominal pain and nausea.   Genitourinary: Negative for dysuria.   Musculoskeletal: Negative for back pain.   Skin: Negative for rash.   Neurological: Negative for weakness.   Hematological: Does not bruise/bleed easily.       Physical Exam     Initial Vitals [06/16/20 1133]   BP Pulse Resp Temp SpO2   133/63 82 20 98 °F (36.7 °C) --      MAP       --         Physical Exam    Nursing note and vitals reviewed.  Constitutional: He appears well-developed and well-nourished.   HENT:   Head: Normocephalic and atraumatic.   Eyes: EOM are normal. Pupils are equal, round, and reactive to light.   Neck: Normal range of motion. Neck supple.   Cardiovascular: Normal rate.   Pulmonary/Chest: Breath sounds normal.   Abdominal: Soft. He exhibits no distension. There is no abdominal tenderness.   Musculoskeletal: Normal range of motion.   Neurological: He is alert and oriented to person, place, and time. No cranial nerve deficit. GCS score is 15. GCS eye subscore is 4. GCS verbal subscore is 5. GCS motor subscore is 6.   Skin: Skin is warm and dry.   Psychiatric: He has a normal mood and affect. Thought content normal.         ED Course   Procedures  Labs Reviewed   POCT GLUCOSE - Abnormal; Notable for the following components:       Result Value    POCT Glucose 452 (*)     All other components within normal limits   CBC W/ AUTO DIFFERENTIAL   COMPREHENSIVE METABOLIC PANEL   URINALYSIS          Imaging Results    None        Discussed control of hyperglycemia with close Follow-up with PCP, or possible Admission for Observation.  Patient elected to contact PCP today for Follow-up appointment in next 24-48 hours.                                  Clinical Impression:       ICD-10-CM  ICD-9-CM   1. Hyperglycemia  R73.9 790.29   2. Chest pain  R07.9 786.50         Disposition:   Disposition: Discharged  Condition: Stable                        Heraclio Tang Jr., MD  06/16/20 1514

## 2020-06-16 NOTE — TELEPHONE ENCOUNTER
----- Message from Blanca Espinosa sent at 2020  3:17 PM CDT -----  Contact: Dee/OSMIN  Miles Currie  MRN: [id number]  : 1965  PCP: Adela Gifford  Home Phone      [phone]  Work Phone      Not on file.  Mobile          [phone]  Home Phone      [phone]    MESSAGE:     Requesting assistance in scheduling an ER follow up with any provider in the next few days. Please call to assist in scheduling and call patient with appointment time.    Phone: [phone]

## 2020-06-16 NOTE — CONSULTS
"[facility]  Cardiology  Consult Note    Patient Name: Miles Currie  MRN: [id number]  Admission Date: 6/16/2020  Hospital Length of Stay: 0 days  Code Status: Prior   Consulting Provider: Bert López NP  Primary Care Physician: Adela Gifford MD  Principal Problem:<principal problem not specified>      Inpatient consult to Cardiology-[facility]  Consult performed by: Papi Self MD  Consult ordered by: Heraclio Tang Jr., MD        Subjective:     Chief Complaint:  Elevated blood sugar    HPI: 53 y/o male with PMHx of CAD, PAD, dyslipidemia, DM presents to the ED with c/o elevated blood sugar. Pt states that his blood sugar has been elevated the last few days prompting ED visit yesterday and today. Pt denies any CP, however states he has had some abdominal discomfort for the last few days described as "trapped gas". [facility]IS asked to evaluate given minimal troponin elevation.     Past Medical History:   Diagnosis Date    Anticoagulant long-term use     plavix and aspiin, pletal    Anxiety and depression     Bipolar disorder     CHF (congestive heart failure)     Coronary artery disease     Diabetes mellitus     Dyslipidemia     Encounter for blood transfusion     GERD (gastroesophageal reflux disease)     History of claustrophobia     IF SOMEONE TOUCHES HIS NOSE    Hypertension     Irregular heartbeat     Ischemic cardiomyopathy     MI (myocardial infarction) 2009    multiple MI's    Multiple gastric ulcers     Pacemaker     PAD (peripheral artery disease)     Presence of automatic implantable cardioverter-defibrillator     PVD (peripheral vascular disease)        Past Surgical History:   Procedure Laterality Date    ANGIOPLASTY Bilateral     right  and left iliac stents    AORTA - BILATERAL FEMORAL ARTERY BYPASS GRAFT  07/09/2013    APPENDECTOMY      ARTERIAL THROMBECTOMY Right 02/16/2012    iliac artery    ARTERIAL THROMBECTOMY Left 02/23/2012    brachial artery    " CARDIAC DEFIBRILLATOR PLACEMENT Right 10/23/2012    [employer]    CORONARY ANGIOPLASTY WITH STENT PLACEMENT      CORONARY ARTERY BYPASS GRAFT  2011    x3    CREATION OF FEMORAL-FEMORAL ARTERY BYPASS WITH GRAFT N/A 3/27/2019    Procedure: CREATION, BYPASS, ARTERIAL, FEMORAL TO FEMORAL, USING GRAFT - RIGHT TO LEFT;  Surgeon: Efren Fung MD;  Location: [facility] OR;  Service: Cardiothoracic;  Laterality: N/A;    ENDARTERECTOMY OF FEMORAL ARTERY Right 3/27/2019    Procedure: ENDARTERECTOMY, FEMORAL;  Surgeon: Efren Fung MD;  Location: [facility] OR;  Service: Cardiothoracic;  Laterality: Right;    EXPLORATION OF FEMORAL ARTERY Left 6/27/2019    Procedure: EXPLORATION, ARTERY, FEMORAL with repair;  Surgeon: Naresh Randolph MD;  Location: [facility] OR;  Service: Cardiovascular;  Laterality: Left;    FINGER SURGERY      amputation left middle finger    PERCUTANEOUS TRANSLUMINAL ANGIOPLASTY (PTA) OF PERIPHERAL VESSEL N/A 1/9/2019    Procedure: PTA, PERIPHERAL VESSEL;  Surgeon: Alex Pittman MD;  Location: [facility] CATH;  Service: Cardiology;  Laterality: N/A;  administer lytic therapy overnight on Wednesday with then plans to touch up other vessels Thursday morning January 10,2019    PERCUTANEOUS TRANSLUMINAL ANGIOPLASTY (PTA) OF PERIPHERAL VESSEL N/A 6/27/2019    Procedure: PTA, PERIPHERAL VESSEL;  Surgeon: Alex Pittman MD;  Location: [facility] CATH;  Service: Cardiology;  Laterality: N/A;    PERCUTANEOUS TRANSLUMINAL ANGIOPLASTY (PTA) OF PERIPHERAL VESSEL N/A 1/30/2020    Procedure: PTA, PERIPHERAL VESSEL;  Surgeon: Alex Pittman MD;  Location: [facility] CATH;  Service: Cardiology;  Laterality: N/A;    WOUND EXPLORATION Left 6/27/2019    Procedure: EXPLORATION, WOUND  Foreign body left femoral;  Surgeon: Naresh Randolph MD;  Location: [facility] OR;  Service: Cardiovascular;  Laterality: Left;       Review of patient's allergies indicates:   Allergen Reactions    Fish oil Swelling    Gabapentin Swelling       No  current facility-administered medications on file prior to encounter.      Current Outpatient Medications on File Prior to Encounter   Medication Sig    aspirin (ECOTRIN) 81 MG EC tablet Take 1 tablet (81 mg total) by mouth once daily.    atorvastatin (LIPITOR) 80 MG tablet Take 80 mg by mouth every evening.    carvedilol (COREG) 3.125 MG tablet Take 3.125 mg by mouth 2 (two) times daily with meals.    cholecalciferol, vitamin D3, (VITAMIN D3) 2,000 unit Tab Take 1 tablet by mouth once daily.    cilostazol (PLETAL) 100 MG Tab Take 100 mg by mouth 2 (two) times daily.    clopidogrel (PLAVIX) 75 mg tablet Take 75 mg by mouth 2 (two) times daily.    ELIQUIS 5 mg Tab Take 5 mg by mouth 2 (two) times daily.     metFORMIN (GLUCOPHAGE) 500 MG tablet Take 1 tablet (500 mg total) by mouth 2 (two) times daily with meals.    omega-3 acid ethyl esters (LOVAZA) 1 gram capsule Take 2 g by mouth 2 (two) times daily.    pantoprazole (PROTONIX) 40 MG tablet Take 1 tablet (40 mg total) by mouth once daily.    sacubitril-valsartan (ENTRESTO) 24-26 mg per tablet Take 1 tablet by mouth 2 (two) times daily.    sucralfate (CARAFATE) 1 gram tablet Take 1 tablet (1 g total) by mouth 4 (four) times daily.    TRUE METRIX GLUCOSE METER Misc CHECK BS BID    TRUE METRIX GLUCOSE TEST STRIP Strp CHECK BS BID    TRUEPLUS LANCETS 33 gauge Misc CHECK BS BID     Family History     Problem Relation (Age of Onset)    Cancer Mother    Diabetes Daughter    Heart attacks under age 50 Brother, Paternal Grandfather    Heart disease Father    Hyperlipidemia Father    Hypertension Father        Tobacco Use    Smoking status: Current Every Day Smoker     Packs/day: 1.00     Years: 40.00     Pack years: 40.00     Types: Cigarettes     Start date: 1978    Smokeless tobacco: Never Used   Substance and Sexual Activity    Alcohol use: Not Currently    Drug use: No    Sexual activity: Yes     ROS   Constitutional : Negative  EENT : Negative  CV :  Negative  Respiratory : Negative  Gastrointestinal: Negative   Genitourinary: Negative  Musculoskeletal: Negative  Skin : Negative  Neurological : AAO x 3   Objective:     Vital Signs (Most Recent):  Temp: 98 °F (36.7 °C) (06/16/20 1133)  Pulse: 82 (06/16/20 1133)  Resp: 20 (06/16/20 1133)  BP: 133/63 (06/16/20 1133) Vital Signs (24h Range):  Temp:  [98 °F (36.7 °C)] 98 °F (36.7 °C)  Pulse:  [82] 82  Resp:  [20] 20  BP: (133)/(63) 133/63     Weight: 72.3 kg (159 lb 8 oz)  Body mass index is 25.74 kg/m².            No intake or output data in the 24 hours ending 06/16/20 1442    Lines/Drains/Airways     Peripheral Intravenous Line                 Peripheral IV - Single Lumen 06/16/20 1242 20 G;1 in Right Hand less than 1 day                Physical Exam   General appearance: alert, appears stated age and cooperative  Head: Normocephalic, without obvious abnormality, atraumatic  Eyes: conjunctivae/corneas clear. PERRL  Neck: no carotid bruit, no JVD and supple, symmetrical, trachea midline  Lungs: clear to auscultation bilaterally, normal respiratory effort  Chest Wall: no tenderness  Heart: regular rate and rhythm, S1, S2 normal, no murmur, click, rub or gallop  Abdomen: soft, non-tender; bowel sounds normal; no masses,  no organomegaly  Extremities: Extremities normal, atraumatic, no cyanosis, clubbing, or edema  Pulses: Dorsalis Pedis R: 2+ (normal)/L: 2+ (normal)  Skin: Skin color, texture, turgor normal. No rashes or lesions  Neurologic: Normal mood and affect  Alert and oriented X 3    Assessment and Plan:     There are no hospital problems to display for this patient.      VTE Risk Mitigation (From admission, onward)    None        ECHO; 5/20  EF 20%  Grade 2 DD  Mod MR  PAP 53 mmHg      DX:  DM uncontrolled  CAD/[facility]ABG [facility] 2/19 shows severe diffuse disease in native vessels and bypass with no further PCI  PAD; severe; poor candidate for surgery; had PCI 1/20  HTN  Dyslipidemia  Ischemic CMO EF 20%  5/20  AICD  Still smokes    Bert López NP for Dr Self  Cardiology   [facility]    PLAN: continue CAD and CMO home Meds  No evidence of MI by symptoms, labs and ekg.    I attest that I have personally seen and examined this patient. I have reviewed and discussed the management in detail as outlined above.

## 2020-06-17 ENCOUNTER — HOSPITAL ENCOUNTER (EMERGENCY)
Facility: HOSPITAL | Age: 55
Discharge: HOME | End: 2020-06-17
Attending: SURGERY
Payer: MEDICARE

## 2020-06-17 VITALS
HEART RATE: 79 BPM | DIASTOLIC BLOOD PRESSURE: 89 MMHG | TEMPERATURE: 97 F | BODY MASS INDEX: 27.92 KG/M2 | SYSTOLIC BLOOD PRESSURE: 120 MMHG | WEIGHT: 173 LBS | OXYGEN SATURATION: 98 % | RESPIRATION RATE: 16 BRPM

## 2020-06-17 DIAGNOSIS — R10.9 ABDOMINAL PAIN, UNSPECIFIED ABDOMINAL LOCATION: Primary | ICD-10-CM

## 2020-06-17 LAB
ALBUMIN SERPL BCP-MCNC: 3.6 G/DL (ref 3.5–5.2)
ALP SERPL-CCNC: 189 U/L (ref 55–135)
ALT SERPL W/O P-5'-P-CCNC: 47 U/L (ref 10–44)
ANION GAP SERPL CALC-SCNC: 10 MMOL/L (ref 8–16)
AST SERPL-CCNC: 26 U/L (ref 10–40)
BASOPHILS # BLD AUTO: 0.04 K/UL (ref 0–0.2)
BASOPHILS NFR BLD: 0.5 % (ref 0–1.9)
BILIRUB SERPL-MCNC: 1.7 MG/DL (ref 0.1–1)
BUN SERPL-MCNC: 11 MG/DL (ref 6–20)
CALCIUM SERPL-MCNC: 9.3 MG/DL (ref 8.7–10.5)
CHLORIDE SERPL-SCNC: 102 MMOL/L (ref 95–110)
CO2 SERPL-SCNC: 27 MMOL/L (ref 23–29)
CREAT SERPL-MCNC: 1 MG/DL (ref 0.5–1.4)
DIFFERENTIAL METHOD: ABNORMAL
EOSINOPHIL # BLD AUTO: 0.1 K/UL (ref 0–0.5)
EOSINOPHIL NFR BLD: 1.5 % (ref 0–8)
ERYTHROCYTE [DISTWIDTH] IN BLOOD BY AUTOMATED COUNT: 13.1 % (ref 11.5–14.5)
EST. GFR  (AFRICAN AMERICAN): >60 ML/MIN/1.73 M^2
EST. GFR  (NON AFRICAN AMERICAN): >60 ML/MIN/1.73 M^2
GLUCOSE SERPL-MCNC: 219 MG/DL (ref 70–110)
GLUCOSE SERPL-MCNC: 283 MG/DL (ref 70–110)
HCT VFR BLD AUTO: 45.7 % (ref 40–54)
HGB BLD-MCNC: 14.9 G/DL (ref 14–18)
IMM GRANULOCYTES # BLD AUTO: 0.02 K/UL (ref 0–0.04)
IMM GRANULOCYTES NFR BLD AUTO: 0.2 % (ref 0–0.5)
LIPASE SERPL-CCNC: 38 U/L (ref 4–60)
LYMPHOCYTES # BLD AUTO: 1.5 K/UL (ref 1–4.8)
LYMPHOCYTES NFR BLD: 18.8 % (ref 18–48)
MCH RBC QN AUTO: 30.1 PG (ref 27–31)
MCHC RBC AUTO-ENTMCNC: 32.6 G/DL (ref 32–36)
MCV RBC AUTO: 92 FL (ref 82–98)
MONOCYTES # BLD AUTO: 0.3 K/UL (ref 0.3–1)
MONOCYTES NFR BLD: 4.2 % (ref 4–15)
NEUTROPHILS # BLD AUTO: 6.1 K/UL (ref 1.8–7.7)
NEUTROPHILS NFR BLD: 74.8 % (ref 38–73)
NRBC BLD-RTO: 0 /100 WBC
PLATELET # BLD AUTO: 171 K/UL (ref 150–350)
PMV BLD AUTO: 10.4 FL (ref 9.2–12.9)
POCT GLUCOSE: 219 MG/DL (ref 70–110)
POTASSIUM SERPL-SCNC: 3.9 MMOL/L (ref 3.5–5.1)
PROT SERPL-MCNC: 6.7 G/DL (ref 6–8.4)
RBC # BLD AUTO: 4.95 M/UL (ref 4.6–6.2)
SODIUM SERPL-SCNC: 139 MMOL/L (ref 136–145)
WBC # BLD AUTO: 8.18 K/UL (ref 3.9–12.7)

## 2020-06-17 PROCEDURE — 25500020 PHARM REV CODE 255: Mod: HCNC | Performed by: SURGERY

## 2020-06-17 PROCEDURE — 85025 COMPLETE CBC W/AUTO DIFF WBC: CPT | Mod: HCNC

## 2020-06-17 PROCEDURE — 80053 COMPREHEN METABOLIC PANEL: CPT | Mod: HCNC

## 2020-06-17 PROCEDURE — 82962 GLUCOSE BLOOD TEST: CPT | Mod: HCNC

## 2020-06-17 PROCEDURE — 94640 AIRWAY INHALATION TREATMENT: CPT | Mod: HCNC

## 2020-06-17 PROCEDURE — 25000242 PHARM REV CODE 250 ALT 637 W/ HCPCS: Mod: HCNC | Performed by: SURGERY

## 2020-06-17 PROCEDURE — 99285 EMERGENCY DEPT VISIT HI MDM: CPT | Mod: 25,HCNC

## 2020-06-17 PROCEDURE — 36415 COLL VENOUS BLD VENIPUNCTURE: CPT | Mod: HCNC

## 2020-06-17 PROCEDURE — 83690 ASSAY OF LIPASE: CPT | Mod: HCNC

## 2020-06-17 RX ORDER — IPRATROPIUM BROMIDE AND ALBUTEROL SULFATE 2.5; .5 MG/3ML; MG/3ML
3 SOLUTION RESPIRATORY (INHALATION)
Status: COMPLETED | OUTPATIENT
Start: 2020-06-17 | End: 2020-06-17

## 2020-06-17 RX ADMIN — IPRATROPIUM BROMIDE AND ALBUTEROL SULFATE 3 ML: .5; 3 SOLUTION RESPIRATORY (INHALATION) at 08:06

## 2020-06-17 RX ADMIN — IOHEXOL 50 ML: 350 INJECTION, SOLUTION INTRAVENOUS at 10:06

## 2020-06-17 RX ADMIN — IOHEXOL 75 ML: 350 INJECTION, SOLUTION INTRAVENOUS at 10:06

## 2020-06-17 NOTE — ED PROVIDER NOTES
Encounter Date: 6/17/2020       History     Chief Complaint   Patient presents with    Abdominal Pain     Patient is 54-year-old white male who returns to the ED today with continued complaints of epigastric pain. Laboratory evaluation yesterday was unremarkable except for elevated serum glucose, which promptly responded to insulin and IV fluid therapy.  He has a follow-up appointment with his primary care tomorrow, but can not have a bowel movement today, despite laxative therapy.  He reports that he has epigastric pain, and a persistent gas pain which will not pass despite belching.  He was seen by Cardiology yesterday, felt not to have cardiac issues as his lab work was stable and EKG showed no acute changes.        Review of patient's allergies indicates:   Allergen Reactions    Fish oil Swelling    Gabapentin Swelling     Past Medical History:   Diagnosis Date    Anticoagulant long-term use     plavix and aspiin, pletal    Anxiety and depression     Bipolar disorder     CHF (congestive heart failure)     Coronary artery disease     Diabetes mellitus     Dyslipidemia     Encounter for blood transfusion     GERD (gastroesophageal reflux disease)     History of claustrophobia     IF SOMEONE TOUCHES HIS NOSE    Hypertension     Irregular heartbeat     Ischemic cardiomyopathy     MI (myocardial infarction) 2009    multiple MI's    Multiple gastric ulcers     Pacemaker     PAD (peripheral artery disease)     Presence of automatic implantable cardioverter-defibrillator     PVD (peripheral vascular disease)      Past Surgical History:   Procedure Laterality Date    ANGIOPLASTY Bilateral     right  and left iliac stents    AORTA - BILATERAL FEMORAL ARTERY BYPASS GRAFT  07/09/2013    APPENDECTOMY      ARTERIAL THROMBECTOMY Right 02/16/2012    iliac artery    ARTERIAL THROMBECTOMY Left 02/23/2012    brachial artery    CARDIAC DEFIBRILLATOR PLACEMENT Right 10/23/2012    [email or web address]    CORONARY  ANGIOPLASTY WITH STENT PLACEMENT      CORONARY ARTERY BYPASS GRAFT  2011    x3    CREATION OF FEMORAL-FEMORAL ARTERY BYPASS WITH GRAFT N/A 3/27/2019    Procedure: CREATION, BYPASS, ARTERIAL, FEMORAL TO FEMORAL, USING GRAFT - RIGHT TO LEFT;  Surgeon: Efren Fung MD;  [facility];  Service: Cardiothoracic;  Laterality: N/A;    ENDARTERECTOMY OF FEMORAL ARTERY Right 3/27/2019    Procedure: ENDARTERECTOMY, FEMORAL;  Surgeon: Efren Fung MD;  [facility];  Service: Cardiothoracic;  Laterality: Right;    EXPLORATION OF FEMORAL ARTERY Left 6/27/2019    Procedure: EXPLORATION, ARTERY, FEMORAL with repair;  Surgeon: Naresh Randolph MD;  [facility];  Service: Cardiovascular;  Laterality: Left;    FINGER SURGERY      amputation left middle finger    PERCUTANEOUS TRANSLUMINAL ANGIOPLASTY (PTA) OF PERIPHERAL VESSEL N/A 1/9/2019    Procedure: PTA, PERIPHERAL VESSEL;  Surgeon: Alex Pittman MD;  [facility];  Service: Cardiology;  Laterality: N/A;  administer lytic therapy overnight on Wednesday with then plans to touch up other vessels Thursday morning January 10,2019    PERCUTANEOUS TRANSLUMINAL ANGIOPLASTY (PTA) OF PERIPHERAL VESSEL N/A 6/27/2019    Procedure: PTA, PERIPHERAL VESSEL;  Surgeon: Alex Pittman MD;  [facility];  Service: Cardiology;  Laterality: N/A;    PERCUTANEOUS TRANSLUMINAL ANGIOPLASTY (PTA) OF PERIPHERAL VESSEL N/A 1/30/2020    Procedure: PTA, PERIPHERAL VESSEL;  Surgeon: Alex Pittman MD;  [facility];  Service: Cardiology;  Laterality: N/A;    WOUND EXPLORATION Left 6/27/2019    Procedure: EXPLORATION, WOUND  Foreign body left femoral;  Surgeon: Naresh Randolph MD;  [facility];  Service: Cardiovascular;  Laterality: Left;     Family History   Problem Relation Age of Onset    Cancer Mother         bone    Heart attacks under age 50 Brother     Diabetes Daughter     Hypertension Father     Heart disease Father      Hyperlipidemia Father     Heart attacks under age 50 Paternal Grandfather      Social History     Tobacco Use    Smoking status: Current Every Day Smoker     Packs/day: 1.00     Years: 40.00     Pack years: 40.00     Types: Cigarettes     Start date: 1978    Smokeless tobacco: Never Used   Substance Use Topics    Alcohol use: Not Currently    Drug use: No     Review of Systems   Constitutional: Negative for fever.   HENT: Negative for sore throat.    Respiratory: Negative for shortness of breath.    Cardiovascular: Negative for chest pain.   Gastrointestinal: Positive for abdominal pain. Negative for nausea.   Genitourinary: Negative for dysuria.   Musculoskeletal: Negative for back pain.   Skin: Negative for rash.   Neurological: Negative for weakness.   Hematological: Does not bruise/bleed easily.       Physical Exam     Initial Vitals [06/17/20 0820]   BP Pulse Resp Temp SpO2   113/81 81 16 96.7 °F (35.9 °C) 96 %      MAP       --         Physical Exam    Nursing note and vitals reviewed.  Constitutional: He appears well-developed and well-nourished.   HENT:   Head: Normocephalic and atraumatic.   Eyes: EOM are normal. Pupils are equal, round, and reactive to light.   Neck: Normal range of motion. Neck supple.   Cardiovascular: Normal rate.   Pulmonary/Chest: No respiratory distress. He has wheezes. He has no rales.   Abdominal: Soft. He exhibits no distension. There is no abdominal tenderness. There is no rebound.   Musculoskeletal: Normal range of motion.   Neurological: He is alert and oriented to person, place, and time.   Skin: Skin is warm and dry.   Psychiatric: He has a normal mood and affect. Thought content normal.         ED Course   Procedures  Labs Reviewed   POCT GLUCOSE MONITORING CONTINUOUS - Abnormal; Notable for the following components:       Result Value    POC Glucose 219 (*)     All other components within normal limits          Imaging Results    None            CT scan of the abdomen  and pelvis shows no acute changes.  Mildly enlarged prostate.  Symptoms suggest possible gastritis, acid reflux.  Patient was on H2 blocker in the past, was taken off.  He tells me that he did better when he was taking his medication.  He has a follow-up appointment tomorrow with his primary care provider.  I have asked him to address these issues, including referral to a GI Medicine specialist for possible upper endoscopy.                               Clinical Impression:       ICD-10-CM ICD-9-CM   1. Abdominal pain, unspecified abdominal location  R10.9 789.00         Disposition:   Disposition: Discharged  Condition: Stable                        Heraclio Tang Jr., MD  06/17/20 1141

## 2020-06-19 ENCOUNTER — TELEPHONE (OUTPATIENT)
Dept: INTERNAL MEDICINE | Facility: CLINIC | Age: 55
End: 2020-06-19

## 2020-06-19 NOTE — TELEPHONE ENCOUNTER
----- Message from Kristal Preciado sent at 2020  3:01 PM CDT -----  Regarding: Appointment Access / Questions - Insulin  Contact: self  Miles Currie  MRN: [id number]  : 1965  PCP: Adela Gifford  Home Phone      [phone]  Work Phone      Not on file.  Mobile          [phone]  Home Phone      [phone]      MESSAGE:    Patient request appointment access due to hospital admit - MD instructed to follow up in one week.   Request to speak to a nurse regarding questions - insulin.  Appointment scheduled - 7/10/20    Phone # [phone]    Pharmacy - [facility], [place] [address]

## 2020-06-19 NOTE — TELEPHONE ENCOUNTER
Patient states that he was admitted to [facility] per cardiology yesterday so he was unable to keep his appt scheduled with Dr. Gifford. He is requesting an appt as soon as possible next week because he was told that he would be given anxiety meds, but no meds were sent in. He also states that he was put on insulin, but pen needles were not ordered. Dr. Gifford will be on vacation until 6/29/20, so an appt was scheduled with Dr. Agarwal for 6/22/20 @ 4:30 pm. Will send request for medical records to [facility].

## 2020-06-22 ENCOUNTER — OFFICE VISIT (OUTPATIENT)
Dept: INTERNAL MEDICINE | Facility: CLINIC | Age: 55
End: 2020-06-22
Payer: MEDICARE

## 2020-06-22 VITALS
SYSTOLIC BLOOD PRESSURE: 120 MMHG | TEMPERATURE: 96 F | HEIGHT: 66 IN | DIASTOLIC BLOOD PRESSURE: 68 MMHG | RESPIRATION RATE: 16 BRPM | WEIGHT: 162.94 LBS | HEART RATE: 82 BPM | BODY MASS INDEX: 26.19 KG/M2 | OXYGEN SATURATION: 98 %

## 2020-06-22 DIAGNOSIS — E11.65 UNCONTROLLED TYPE 2 DIABETES MELLITUS WITH HYPERGLYCEMIA: Primary | ICD-10-CM

## 2020-06-22 DIAGNOSIS — I50.22 CHRONIC SYSTOLIC CONGESTIVE HEART FAILURE: ICD-10-CM

## 2020-06-22 DIAGNOSIS — F41.9 ANXIETY DISORDER, UNSPECIFIED TYPE: ICD-10-CM

## 2020-06-22 PROCEDURE — 99999 PR PBB SHADOW E&M-EST. PATIENT-LVL IV: CPT | Mod: PBBFAC,HCNC,, | Performed by: INTERNAL MEDICINE

## 2020-06-22 PROCEDURE — 99499 RISK ADDL DX/OHS AUDIT: ICD-10-PCS | Mod: HCNC,S$GLB,, | Performed by: INTERNAL MEDICINE

## 2020-06-22 PROCEDURE — 99999 PR PBB SHADOW E&M-EST. PATIENT-LVL IV: ICD-10-PCS | Mod: PBBFAC,HCNC,, | Performed by: INTERNAL MEDICINE

## 2020-06-22 PROCEDURE — 99214 OFFICE O/P EST MOD 30 MIN: CPT | Mod: HCNC,S$GLB,, | Performed by: INTERNAL MEDICINE

## 2020-06-22 PROCEDURE — 3046F PR MOST RECENT HEMOGLOBIN A1C LEVEL > 9.0%: ICD-10-PCS | Mod: HCNC,CPTII,S$GLB, | Performed by: INTERNAL MEDICINE

## 2020-06-22 PROCEDURE — 3046F HEMOGLOBIN A1C LEVEL >9.0%: CPT | Mod: HCNC,CPTII,S$GLB, | Performed by: INTERNAL MEDICINE

## 2020-06-22 PROCEDURE — 99214 PR OFFICE/OUTPT VISIT, EST, LEVL IV, 30-39 MIN: ICD-10-PCS | Mod: HCNC,S$GLB,, | Performed by: INTERNAL MEDICINE

## 2020-06-22 PROCEDURE — 99499 UNLISTED E&M SERVICE: CPT | Mod: HCNC,S$GLB,, | Performed by: INTERNAL MEDICINE

## 2020-06-22 PROCEDURE — 3008F PR BODY MASS INDEX (BMI) DOCUMENTED: ICD-10-PCS | Mod: HCNC,CPTII,S$GLB, | Performed by: INTERNAL MEDICINE

## 2020-06-22 PROCEDURE — 3008F BODY MASS INDEX DOCD: CPT | Mod: HCNC,CPTII,S$GLB, | Performed by: INTERNAL MEDICINE

## 2020-06-22 RX ORDER — PAROXETINE 10 MG/1
10 TABLET, FILM COATED ORAL DAILY
Qty: 30 TABLET | Refills: 0 | Status: SHIPPED | OUTPATIENT
Start: 2020-06-22 | End: 2020-07-10

## 2020-06-22 RX ORDER — INSULIN GLARGINE 100 [IU]/ML
25 INJECTION, SOLUTION SUBCUTANEOUS 2 TIMES DAILY
Qty: 45 ML | Refills: 3 | Status: SHIPPED | OUTPATIENT
Start: 2020-06-22 | End: 2020-11-13 | Stop reason: SDUPTHER

## 2020-06-22 RX ORDER — SAXAGLIPTIN 5 MG/1
5 TABLET, FILM COATED ORAL DAILY
COMMUNITY
End: 2021-09-28

## 2020-06-22 RX ORDER — SYRINGE,SAFETY WITH NEEDLE,1ML 25GX1"
SYRINGE (EA) MISCELLANEOUS
Qty: 100 EACH | Refills: 0 | Status: SHIPPED | OUTPATIENT
Start: 2020-06-22 | End: 2020-10-29 | Stop reason: SDUPTHER

## 2020-06-22 NOTE — PROGRESS NOTES
Subjective:       Patient ID: Miles Currie is a 54 y.o. male.    Chief Complaint: Hospital Follow Up    Miles Currie is a 54 y.o. male  Admitted to hospital ;thibadoux :  He went in for CHF  And uncontrolled DM ( a1c 12 %)     He is off of metformin . He was placed on lantus 25 units  Bid   He reports sugars are better .  He is also  taking his diuretics ;feeling better.    He reports his breating is better       Anxiety  Presents for initial visit. Onset was 1 to 6 months ago. The problem has been gradually worsening. Symptoms include decreased concentration, excessive worry, insomnia, irritability, muscle tension, nervous/anxious behavior, panic and shortness of breath. Patient reports no depressed mood, dizziness or nausea. Symptoms occur most days. The severity of symptoms is interfering with daily activities. The quality of sleep is poor. Nighttime awakenings: one to two.     There is no history of suicide attempts.     Review of Systems   Constitutional: Positive for irritability. Negative for activity change, fatigue, fever and unexpected weight change.   HENT: Negative for congestion, ear pain, hearing loss, rhinorrhea and sore throat.    Eyes: Negative for redness and visual disturbance.   Respiratory: Positive for shortness of breath. Negative for cough and wheezing.         SOB better with diuretics   Cardiovascular: Negative for leg swelling.   Gastrointestinal: Negative for abdominal pain, constipation, diarrhea, nausea and vomiting.   Genitourinary: Negative for decreased urine volume, dysuria, frequency and urgency.   Musculoskeletal: Negative for back pain, joint swelling and neck pain.   Skin: Negative for color change, rash and wound.   Neurological: Negative for dizziness, tremors, weakness, light-headedness and headaches. Syncope: near syncope.   Psychiatric/Behavioral: Positive for decreased concentration. The patient is nervous/anxious and has insomnia.        Objective:      Physical  "Exam  Vitals signs reviewed.   Constitutional:       General: He is not in acute distress.     Appearance: He is well-developed.   HENT:      Head: Normocephalic and atraumatic.      Right Ear: External ear normal.      Left Ear: External ear normal.   Eyes:      General:         Right eye: No discharge.         Left eye: No discharge.      Conjunctiva/sclera: Conjunctivae normal.      Pupils: Pupils are equal, round, and reactive to light.   Neck:      Musculoskeletal: Neck supple.      Trachea: No tracheal deviation.   Cardiovascular:      Rate and Rhythm: Normal rate and regular rhythm.      Heart sounds: No murmur.   Pulmonary:      Effort: Pulmonary effort is normal. No respiratory distress.      Breath sounds: Normal breath sounds. No wheezing or rales.   Abdominal:      General: Bowel sounds are normal. There is no distension.      Palpations: Abdomen is soft.      Tenderness: There is no abdominal tenderness.   Skin:     General: Skin is warm and dry.   Neurological:      Mental Status: He is alert and oriented to person, place, and time.      Cranial Nerves: No cranial nerve deficit.   Psychiatric:         Behavior: Behavior normal.         Assessment:       1. Uncontrolled type 2 diabetes mellitus with hyperglycemia    2. Chronic systolic congestive heart failure    3. Anxiety disorder, unspecified type        Plan:   Miles was seen today for hospital follow up.    Diagnoses and all orders for this visit:    Uncontrolled type 2 diabetes mellitus with hyperglycemia  -     insulin glargine (LANTUS U-100 INSULIN) 100 unit/mL injection; Inject 25 Units into the skin 2 (two) times a day. At bedtime  -     insulin syringe-needle U-100 1 mL 29 gauge x 1/2" Syrg; BID  -     CBC auto differential; Future  -     Comprehensive metabolic panel; Future  -     Hemoglobin A1C; Future  Continue lantus   Continue with onglyza.  Check a1c in 4 weeks     Chronic systolic congestive heart failure  -     Brain Natriuretic " Peptide; Future  We discussed about leg edema  And  weight gain issues with CHF.  I educated patient to call  If he  Gains 5 lbs and more ,or legs start swelling or worsening shortness of breath , or inability to lie down.  If breathing is worse go to emergency Room      Anxiety disorder, unspecified type  -     paroxetine (PAXIL) 10 MG tablet; Take 1 tablet (10 mg total) by mouth once daily.    I've explained to him that drugs of the SSRI class can have side effects such as weight gain, sexual dysfunction, insomnia, headache, nausea. These medications are generally effective at alleviating symptoms of anxiety and/or depression. Let me know if significant side effects do occur.    Problem List Items Addressed This Visit     None

## 2020-06-26 DIAGNOSIS — E11.9 TYPE 2 DIABETES MELLITUS WITHOUT COMPLICATION: ICD-10-CM

## 2020-06-26 DIAGNOSIS — E11.9 TYPE 2 DIABETES MELLITUS WITHOUT COMPLICATION, UNSPECIFIED WHETHER LONG TERM INSULIN USE: ICD-10-CM

## 2020-07-10 ENCOUNTER — OFFICE VISIT (OUTPATIENT)
Dept: INTERNAL MEDICINE | Facility: CLINIC | Age: 55
End: 2020-07-10
Payer: MEDICARE

## 2020-07-10 VITALS
DIASTOLIC BLOOD PRESSURE: 70 MMHG | OXYGEN SATURATION: 98 % | BODY MASS INDEX: 26.22 KG/M2 | SYSTOLIC BLOOD PRESSURE: 114 MMHG | HEIGHT: 66 IN | HEART RATE: 78 BPM | TEMPERATURE: 98 F | RESPIRATION RATE: 20 BRPM | WEIGHT: 163.13 LBS

## 2020-07-10 DIAGNOSIS — R60.0 EDEMA OF LEFT LOWER EXTREMITY: ICD-10-CM

## 2020-07-10 DIAGNOSIS — F41.9 ANXIETY: ICD-10-CM

## 2020-07-10 DIAGNOSIS — I73.9 PAD (PERIPHERAL ARTERY DISEASE): ICD-10-CM

## 2020-07-10 DIAGNOSIS — I50.22 CHRONIC SYSTOLIC CONGESTIVE HEART FAILURE: Primary | ICD-10-CM

## 2020-07-10 PROCEDURE — 3008F BODY MASS INDEX DOCD: CPT | Mod: HCNC,CPTII,S$GLB, | Performed by: INTERNAL MEDICINE

## 2020-07-10 PROCEDURE — 99999 PR PBB SHADOW E&M-EST. PATIENT-LVL V: CPT | Mod: PBBFAC,HCNC,, | Performed by: INTERNAL MEDICINE

## 2020-07-10 PROCEDURE — 99214 OFFICE O/P EST MOD 30 MIN: CPT | Mod: HCNC,S$GLB,, | Performed by: INTERNAL MEDICINE

## 2020-07-10 PROCEDURE — 3008F PR BODY MASS INDEX (BMI) DOCUMENTED: ICD-10-PCS | Mod: HCNC,CPTII,S$GLB, | Performed by: INTERNAL MEDICINE

## 2020-07-10 PROCEDURE — 99214 PR OFFICE/OUTPT VISIT, EST, LEVL IV, 30-39 MIN: ICD-10-PCS | Mod: HCNC,S$GLB,, | Performed by: INTERNAL MEDICINE

## 2020-07-10 PROCEDURE — 99999 PR PBB SHADOW E&M-EST. PATIENT-LVL V: ICD-10-PCS | Mod: PBBFAC,HCNC,, | Performed by: INTERNAL MEDICINE

## 2020-07-10 RX ORDER — PAROXETINE HYDROCHLORIDE 20 MG/1
20 TABLET, FILM COATED ORAL EVERY MORNING
Qty: 30 TABLET | Refills: 11 | Status: SHIPPED | OUTPATIENT
Start: 2020-07-10 | End: 2020-11-13 | Stop reason: SDUPTHER

## 2020-07-10 NOTE — PROGRESS NOTES
"Subjective:       Patient ID: Miles Currie is a 54 y.o. male.    Chief Complaint: Follow-up (3 wk) and Leg Swelling      HPI:  Patient is known to me from one visit and presents with leg swelling and presyncope sx with activity.    He reports if he does anything physical he feels weak and "like I will pass out" and "I have to urinate and can't hold it in". Sx lasts 5-10 minutes. Resolves with rest. He reports he feels generalized anxiety and taking Paxil for 2-3 weeks but not improving his symptoms.   He reports worsening LE edema for last 4 days. He reports only the left leg is swelling. He denies pain in the leg. Breathing is gradually worsening over last few days. Mild cough. No fevers. + orthopnea. Taking entresto, aldactone 25mg but doesn't look like he is taking any lasix.     He was dealing with hyperglycemia but since switching to insulin blood sugars are 90-120s on average. No hyperglycemia. No hypoglycemia recorded; lowest is 97.       Past Medical History:   Diagnosis Date    Anticoagulant long-term use     plavix and aspiin, pletal    Anxiety and depression     Bipolar disorder     CHF (congestive heart failure)     Coronary artery disease     Diabetes mellitus     Dyslipidemia     Encounter for blood transfusion     GERD (gastroesophageal reflux disease)     History of claustrophobia     IF SOMEONE TOUCHES HIS NOSE    Hypertension     Irregular heartbeat     Ischemic cardiomyopathy     MI (myocardial infarction) 2009    multiple MI's    Multiple gastric ulcers     Pacemaker     PAD (peripheral artery disease)     Presence of automatic implantable cardioverter-defibrillator     PVD (peripheral vascular disease)        Family History   Problem Relation Age of Onset    Cancer Mother         bone    Heart attacks under age 50 Brother     Diabetes Daughter     Hypertension Father     Heart disease Father     Hyperlipidemia Father     Heart attacks under age 50 Paternal " Grandfather        Social History     Socioeconomic History    Marital status: Legally      Spouse name: Not on file    Number of children: Not on file    Years of education: Not on file    Highest education level: Not on file   Occupational History    Not on file   Social Needs    Financial resource strain: Not on file    Food insecurity     Worry: Not on file     Inability: Not on file    Transportation needs     Medical: Not on file     Non-medical: Not on file   Tobacco Use    Smoking status: Current Every Day Smoker     Packs/day: 1.00     Years: 40.00     Pack years: 40.00     Types: Cigarettes     Start date: 1978    Smokeless tobacco: Never Used   Substance and Sexual Activity    Alcohol use: Not Currently    Drug use: No    Sexual activity: Yes   Lifestyle    Physical activity     Days per week: Not on file     Minutes per session: Not on file    Stress: Only a little   Relationships    Social connections     Talks on phone: Not on file     Gets together: Not on file     Attends Mu-ism service: Not on file     Active member of club or organization: Not on file     Attends meetings of clubs or organizations: Not on file     Relationship status: Not on file   Other Topics Concern    Not on file   Social History Narrative    Not on file       Review of Systems   Constitutional: Positive for fatigue. Negative for activity change, fever and unexpected weight change.   HENT: Negative for congestion, ear pain, hearing loss, rhinorrhea and sore throat.    Eyes: Negative for pain, redness and visual disturbance.   Respiratory: Positive for shortness of breath (PIERRE). Negative for cough and wheezing.    Cardiovascular: Positive for leg swelling (left). Negative for chest pain and palpitations.   Gastrointestinal: Negative for abdominal pain, constipation, diarrhea, nausea and vomiting.   Genitourinary: Negative for decreased urine volume, dysuria, frequency and urgency.   Musculoskeletal:  Negative for back pain, joint swelling and neck pain.   Skin: Negative for color change, rash and wound.   Neurological: Positive for weakness (generalized). Negative for dizziness, tremors, light-headedness and headaches.   Psychiatric/Behavioral: Positive for suicidal ideas. The patient is nervous/anxious.          Objective:      Physical Exam  Vitals signs reviewed.   Constitutional:       General: He is not in acute distress.     Appearance: He is well-developed.   HENT:      Head: Normocephalic and atraumatic.      Right Ear: External ear normal.      Left Ear: External ear normal.      Nose: Nose normal.   Eyes:      General:         Right eye: No discharge.         Left eye: No discharge.      Conjunctiva/sclera: Conjunctivae normal.      Pupils: Pupils are equal, round, and reactive to light.   Neck:      Musculoskeletal: Neck supple.      Thyroid: No thyromegaly.   Cardiovascular:      Rate and Rhythm: Normal rate and regular rhythm.      Heart sounds: No murmur. No friction rub. No gallop.    Pulmonary:      Effort: Pulmonary effort is normal. No respiratory distress.      Breath sounds: Rales (soft b/l bases) present. No wheezing.   Abdominal:      General: Bowel sounds are normal. There is distension (fluid).      Palpations: Abdomen is soft.      Tenderness: There is no abdominal tenderness.   Musculoskeletal:      Right lower leg: No edema.      Left lower leg: Edema (2+ swelling left leg to knee) present.   Lymphadenopathy:      Cervical: No cervical adenopathy.   Skin:     General: Skin is warm and dry.      Coloration: Skin is not pale.      Findings: No erythema or rash.   Neurological:      Mental Status: He is alert and oriented to person, place, and time.      Cranial Nerves: No cranial nerve deficit.   Psychiatric:         Behavior: Behavior normal.         Assessment:       1. Chronic systolic congestive heart failure    2. Anxiety    3. Edema of left lower extremity    4. PAD (peripheral  "artery disease)        Plan:       Miles was seen today for follow-up and leg swelling.    Diagnoses and all orders for this visit:    Chronic systolic congestive heart failure    Anxiety  -     paroxetine (PAXIL) 20 MG tablet; Take 1 tablet (20 mg total) by mouth every morning.    Edema of left lower extremity  -     US Lower Extremity Veins Left; Future  -     US Lower Extremity Arteries Left; Future    PAD (peripheral artery disease)  -     US Lower Extremity Veins Left; Future  -     US Lower Extremity Arteries Left; Future      He has severe systolic CHF  I suspect class 4 symptoms are due to severity of heart disease  I do not think he has significant volume overload from his baseline  His lungs are overall very clear, normal O2 sats  His leg swelling is unilateral which speaks less to CHF though in differential  He needs US of left leg, refuses to go today. "I have to go back to work". Long discussion about delay in care and he is able to voice risks to me including loss of limb and loss of life. He will go Monday for imaging  ER precautions discussed at length. To ER over weekend if worsening sx. This patient should likely never be admitted to [facility]. Sees cardiology at [place]; severe heart disease    Will incrase paxil from 10 to 20mg. No side effects. No effective at 10mg dose              "

## 2020-07-13 ENCOUNTER — HOSPITAL ENCOUNTER (OUTPATIENT)
Dept: RADIOLOGY | Facility: HOSPITAL | Age: 55
Discharge: HOME | End: 2020-07-13
Attending: INTERNAL MEDICINE
Payer: MEDICARE

## 2020-07-13 DIAGNOSIS — R60.0 EDEMA OF LEFT LOWER EXTREMITY: ICD-10-CM

## 2020-07-13 DIAGNOSIS — I73.9 PAD (PERIPHERAL ARTERY DISEASE): ICD-10-CM

## 2020-07-13 PROCEDURE — 93926 LOWER EXTREMITY STUDY: CPT | Mod: 26,HCNC,LT, | Performed by: RADIOLOGY

## 2020-07-13 PROCEDURE — 93926 US LOWER EXTREMITY ARTERIES LEFT: ICD-10-PCS | Mod: 26,HCNC,LT, | Performed by: RADIOLOGY

## 2020-07-13 PROCEDURE — 93971 EXTREMITY STUDY: CPT | Mod: TC,HCNC,LT

## 2020-07-13 PROCEDURE — 93971 US LOWER EXTREMITY VEINS LEFT: ICD-10-PCS | Mod: 26,HCNC,LT, | Performed by: RADIOLOGY

## 2020-07-13 PROCEDURE — 93926 LOWER EXTREMITY STUDY: CPT | Mod: TC,HCNC,LT

## 2020-07-13 PROCEDURE — 93971 EXTREMITY STUDY: CPT | Mod: 26,HCNC,LT, | Performed by: RADIOLOGY

## 2020-07-16 NOTE — PROGRESS NOTES
Informed pt of results. Expressed understanding. HE will schedule appt with Dr Pittman. I sent results to Dr Pittman to review.

## 2020-09-15 ENCOUNTER — HOSPITAL ENCOUNTER (EMERGENCY)
Facility: HOSPITAL | Age: 55
Discharge: HOME | End: 2020-09-15
Attending: SURGERY
Payer: MEDICARE

## 2020-09-15 ENCOUNTER — OFFICE VISIT (OUTPATIENT)
Dept: INTERNAL MEDICINE | Facility: CLINIC | Age: 55
End: 2020-09-15
Payer: MEDICARE

## 2020-09-15 VITALS
SYSTOLIC BLOOD PRESSURE: 111 MMHG | BODY MASS INDEX: 28.68 KG/M2 | HEART RATE: 69 BPM | RESPIRATION RATE: 17 BRPM | WEIGHT: 177.69 LBS | TEMPERATURE: 97 F | DIASTOLIC BLOOD PRESSURE: 67 MMHG | OXYGEN SATURATION: 97 %

## 2020-09-15 VITALS — BODY MASS INDEX: 28.31 KG/M2 | WEIGHT: 176.13 LBS | RESPIRATION RATE: 18 BRPM | HEIGHT: 66 IN | TEMPERATURE: 98 F

## 2020-09-15 DIAGNOSIS — I50.23 ACUTE ON CHRONIC SYSTOLIC CHF (CONGESTIVE HEART FAILURE): Primary | ICD-10-CM

## 2020-09-15 DIAGNOSIS — R34 LOW URINE OUTPUT: ICD-10-CM

## 2020-09-15 DIAGNOSIS — R06.02 SOB (SHORTNESS OF BREATH): ICD-10-CM

## 2020-09-15 DIAGNOSIS — R19.00 ABDOMINAL SWELLING: ICD-10-CM

## 2020-09-15 DIAGNOSIS — R60.0 LEG EDEMA, LEFT: ICD-10-CM

## 2020-09-15 DIAGNOSIS — R06.02 SHORTNESS OF BREATH: Primary | ICD-10-CM

## 2020-09-15 LAB
ALBUMIN SERPL BCP-MCNC: 3.9 G/DL (ref 3.5–5.2)
ALP SERPL-CCNC: 126 U/L (ref 55–135)
ALT SERPL W/O P-5'-P-CCNC: 17 U/L (ref 10–44)
ANION GAP SERPL CALC-SCNC: 9 MMOL/L (ref 8–16)
APTT BLDCRRT: 30.2 SEC (ref 21–32)
AST SERPL-CCNC: 13 U/L (ref 10–40)
BASOPHILS # BLD AUTO: 0.05 K/UL (ref 0–0.2)
BASOPHILS NFR BLD: 1 % (ref 0–1.9)
BILIRUB SERPL-MCNC: 1.1 MG/DL (ref 0.1–1)
BNP SERPL-MCNC: 262 PG/ML (ref 0–99)
BUN SERPL-MCNC: 15 MG/DL (ref 6–20)
CALCIUM SERPL-MCNC: 9.2 MG/DL (ref 8.7–10.5)
CHLORIDE SERPL-SCNC: 104 MMOL/L (ref 95–110)
CK MB SERPL-MCNC: 3.8 NG/ML (ref 0.1–6.5)
CK MB SERPL-RTO: 3.3 % (ref 0–5)
CK SERPL-CCNC: 115 U/L (ref 20–200)
CK SERPL-CCNC: 115 U/L (ref 20–200)
CO2 SERPL-SCNC: 27 MMOL/L (ref 23–29)
CREAT SERPL-MCNC: 1 MG/DL (ref 0.5–1.4)
D DIMER PPP IA.FEU-MCNC: 0.92 MG/L FEU
DIFFERENTIAL METHOD: ABNORMAL
EOSINOPHIL # BLD AUTO: 0.2 K/UL (ref 0–0.5)
EOSINOPHIL NFR BLD: 3.1 % (ref 0–8)
ERYTHROCYTE [DISTWIDTH] IN BLOOD BY AUTOMATED COUNT: 15 % (ref 11.5–14.5)
EST. GFR  (AFRICAN AMERICAN): >60 ML/MIN/1.73 M^2
EST. GFR  (NON AFRICAN AMERICAN): >60 ML/MIN/1.73 M^2
GLUCOSE SERPL-MCNC: 102 MG/DL (ref 70–110)
HCT VFR BLD AUTO: 42.8 % (ref 40–54)
HGB BLD-MCNC: 13.7 G/DL (ref 14–18)
IMM GRANULOCYTES # BLD AUTO: 0.01 K/UL (ref 0–0.04)
IMM GRANULOCYTES NFR BLD AUTO: 0.2 % (ref 0–0.5)
INR PPP: 1 (ref 0.8–1.2)
LYMPHOCYTES # BLD AUTO: 1.4 K/UL (ref 1–4.8)
LYMPHOCYTES NFR BLD: 29.1 % (ref 18–48)
MCH RBC QN AUTO: 29.9 PG (ref 27–31)
MCHC RBC AUTO-ENTMCNC: 32 G/DL (ref 32–36)
MCV RBC AUTO: 93 FL (ref 82–98)
MONOCYTES # BLD AUTO: 0.4 K/UL (ref 0.3–1)
MONOCYTES NFR BLD: 8.4 % (ref 4–15)
NEUTROPHILS # BLD AUTO: 2.9 K/UL (ref 1.8–7.7)
NEUTROPHILS NFR BLD: 58.2 % (ref 38–73)
NRBC BLD-RTO: 0 /100 WBC
PLATELET # BLD AUTO: 171 K/UL (ref 150–350)
PMV BLD AUTO: 9.6 FL (ref 9.2–12.9)
POTASSIUM SERPL-SCNC: 4.6 MMOL/L (ref 3.5–5.1)
PROT SERPL-MCNC: 7.3 G/DL (ref 6–8.4)
PROTHROMBIN TIME: 10.6 SEC (ref 9–12.5)
RBC # BLD AUTO: 4.58 M/UL (ref 4.6–6.2)
SARS-COV-2 RDRP RESP QL NAA+PROBE: NEGATIVE
SODIUM SERPL-SCNC: 140 MMOL/L (ref 136–145)
TROPONIN I SERPL DL<=0.01 NG/ML-MCNC: 0.05 NG/ML (ref 0–0.03)
TROPONIN I SERPL DL<=0.01 NG/ML-MCNC: 0.06 NG/ML (ref 0–0.03)
WBC # BLD AUTO: 4.91 K/UL (ref 3.9–12.7)

## 2020-09-15 PROCEDURE — 99214 PR OFFICE/OUTPT VISIT, EST, LEVL IV, 30-39 MIN: ICD-10-PCS | Mod: HCNC,S$GLB,, | Performed by: INTERNAL MEDICINE

## 2020-09-15 PROCEDURE — 84484 ASSAY OF TROPONIN QUANT: CPT | Mod: 91,HCNC

## 2020-09-15 PROCEDURE — 99999 PR PBB SHADOW E&M-EST. PATIENT-LVL IV: ICD-10-PCS | Mod: PBBFAC,HCNC,, | Performed by: INTERNAL MEDICINE

## 2020-09-15 PROCEDURE — 85730 THROMBOPLASTIN TIME PARTIAL: CPT | Mod: HCNC

## 2020-09-15 PROCEDURE — 99285 EMERGENCY DEPT VISIT HI MDM: CPT | Mod: 25,HCNC

## 2020-09-15 PROCEDURE — 83880 ASSAY OF NATRIURETIC PEPTIDE: CPT | Mod: HCNC

## 2020-09-15 PROCEDURE — 93005 ELECTROCARDIOGRAM TRACING: CPT | Mod: HCNC

## 2020-09-15 PROCEDURE — 93010 ELECTROCARDIOGRAM REPORT: CPT | Mod: HCNC,,, | Performed by: INTERNAL MEDICINE

## 2020-09-15 PROCEDURE — 3008F PR BODY MASS INDEX (BMI) DOCUMENTED: ICD-10-PCS | Mod: HCNC,CPTII,S$GLB, | Performed by: INTERNAL MEDICINE

## 2020-09-15 PROCEDURE — 25500020 PHARM REV CODE 255: Mod: HCNC | Performed by: SURGERY

## 2020-09-15 PROCEDURE — 99499 UNLISTED E&M SERVICE: CPT | Mod: S$GLB,,, | Performed by: INTERNAL MEDICINE

## 2020-09-15 PROCEDURE — 82550 ASSAY OF CK (CPK): CPT | Mod: HCNC

## 2020-09-15 PROCEDURE — 3008F BODY MASS INDEX DOCD: CPT | Mod: HCNC,CPTII,S$GLB, | Performed by: INTERNAL MEDICINE

## 2020-09-15 PROCEDURE — U0002 COVID-19 LAB TEST NON-CDC: HCPCS | Mod: HCNC

## 2020-09-15 PROCEDURE — 99214 OFFICE O/P EST MOD 30 MIN: CPT | Mod: HCNC,S$GLB,, | Performed by: INTERNAL MEDICINE

## 2020-09-15 PROCEDURE — 85610 PROTHROMBIN TIME: CPT | Mod: HCNC

## 2020-09-15 PROCEDURE — 82553 CREATINE MB FRACTION: CPT | Mod: HCNC

## 2020-09-15 PROCEDURE — 84484 ASSAY OF TROPONIN QUANT: CPT | Mod: HCNC

## 2020-09-15 PROCEDURE — 80053 COMPREHEN METABOLIC PANEL: CPT | Mod: HCNC

## 2020-09-15 PROCEDURE — 93010 EKG 12-LEAD: ICD-10-PCS | Mod: HCNC,,, | Performed by: INTERNAL MEDICINE

## 2020-09-15 PROCEDURE — 99999 PR PBB SHADOW E&M-EST. PATIENT-LVL IV: CPT | Mod: PBBFAC,HCNC,, | Performed by: INTERNAL MEDICINE

## 2020-09-15 PROCEDURE — 85025 COMPLETE CBC W/AUTO DIFF WBC: CPT | Mod: HCNC

## 2020-09-15 PROCEDURE — 36415 COLL VENOUS BLD VENIPUNCTURE: CPT | Mod: HCNC

## 2020-09-15 PROCEDURE — 85379 FIBRIN DEGRADATION QUANT: CPT | Mod: HCNC

## 2020-09-15 PROCEDURE — 99499 RISK ADDL DX/OHS AUDIT: ICD-10-PCS | Mod: S$GLB,,, | Performed by: INTERNAL MEDICINE

## 2020-09-15 RX ORDER — TORSEMIDE 10 MG/1
10 TABLET ORAL DAILY
Qty: 90 TABLET | Refills: 3 | Status: ON HOLD | OUTPATIENT
Start: 2020-09-15 | End: 2023-05-27

## 2020-09-15 RX ADMIN — IOHEXOL 75 ML: 350 INJECTION, SOLUTION INTRAVENOUS at 05:09

## 2020-09-15 NOTE — ED NOTES
Patient resting on stretcher, playing with cell phone. NAD noted. Awaiting results. Will continue to monitor.

## 2020-09-15 NOTE — ED NOTES
Patient resting with even and unlabored respirations. NAD noted. NSR noted per cardiac monitor. Call bell in reach, instructed patient to call for needs.

## 2020-09-15 NOTE — ED PROVIDER NOTES
Encounter Date: 9/15/2020       History     Chief Complaint   Patient presents with    Shortness of Breath     55 y/o male with history of CHF reports shortness of breath and fluid overload since last week. Sent for eval and possible admission per PCP.     The history is provided by the patient.   Shortness of Breath  This is a recurrent problem. The problem occurs intermittently.The problem has not changed since onset.Associated symptoms include cough and leg swelling. Pertinent negatives include no fever, no headaches, no rhinorrhea, no sore throat, no ear pain, no neck pain, no chest pain, no vomiting, no abdominal pain and no rash. Precipitated by: laying down. Risk factors include smoking. He has tried nothing for the symptoms. Associated medical issues include heart failure.   Was prescribed fluid pill but ran out the last few days.    Review of patient's allergies indicates:   Allergen Reactions    Fish oil Swelling    Gabapentin Swelling     Past Medical History:   Diagnosis Date    Anticoagulant long-term use     plavix and aspiin, pletal    Anxiety and depression     Bipolar disorder     CHF (congestive heart failure)     Coronary artery disease     Diabetes mellitus     Dyslipidemia     Encounter for blood transfusion     GERD (gastroesophageal reflux disease)     History of claustrophobia     IF SOMEONE TOUCHES HIS NOSE    Hypertension     Irregular heartbeat     Ischemic cardiomyopathy     MI (myocardial infarction) 2009    multiple MI's    Multiple gastric ulcers     Pacemaker     PAD (peripheral artery disease)     Presence of automatic implantable cardioverter-defibrillator     PVD (peripheral vascular disease)      Past Surgical History:   Procedure Laterality Date    ANGIOPLASTY Bilateral     right  and left iliac stents    AORTA - BILATERAL FEMORAL ARTERY BYPASS GRAFT  07/09/2013    APPENDECTOMY      ARTERIAL THROMBECTOMY Right 02/16/2012    iliac artery    ARTERIAL  THROMBECTOMY Left 02/23/2012    brachial artery    CARDIAC DEFIBRILLATOR PLACEMENT Right 10/23/2012    [employer]    CORONARY ANGIOPLASTY WITH STENT PLACEMENT      CORONARY ARTERY BYPASS GRAFT  2011    x3    CREATION OF FEMORAL-FEMORAL ARTERY BYPASS WITH GRAFT N/A 3/27/2019    Procedure: CREATION, BYPASS, ARTERIAL, FEMORAL TO FEMORAL, USING GRAFT - RIGHT TO LEFT;  Surgeon: Efren Fung MD;  [facility];  Service: Cardiothoracic;  Laterality: N/A;    ENDARTERECTOMY OF FEMORAL ARTERY Right 3/27/2019    Procedure: ENDARTERECTOMY, FEMORAL;  Surgeon: Efren Fung MD;  [facility];  Service: Cardiothoracic;  Laterality: Right;    EXPLORATION OF FEMORAL ARTERY Left 6/27/2019    Procedure: EXPLORATION, ARTERY, FEMORAL with repair;  Surgeon: Naresh Randolph MD;  [facility];  Service: Cardiovascular;  Laterality: Left;    FINGER SURGERY      amputation left middle finger    PERCUTANEOUS TRANSLUMINAL ANGIOPLASTY (PTA) OF PERIPHERAL VESSEL N/A 1/9/2019    Procedure: PTA, PERIPHERAL VESSEL;  Surgeon: Alex Pittman MD;  [facility];  Service: Cardiology;  Laterality: N/A;  administer lytic therapy overnight on Wednesday with then plans to touch up other vessels Thursday morning January 10,2019    PERCUTANEOUS TRANSLUMINAL ANGIOPLASTY (PTA) OF PERIPHERAL VESSEL N/A 6/27/2019    Procedure: PTA, PERIPHERAL VESSEL;  Surgeon: Alex Pittman MD;  [facility];  Service: Cardiology;  Laterality: N/A;    PERCUTANEOUS TRANSLUMINAL ANGIOPLASTY (PTA) OF PERIPHERAL VESSEL N/A 1/30/2020    Procedure: PTA, PERIPHERAL VESSEL;  Surgeon: Alex Pittman MD;  [facility];  Service: Cardiology;  Laterality: N/A;    WOUND EXPLORATION Left 6/27/2019    Procedure: EXPLORATION, WOUND  Foreign body left femoral;  Surgeon: Naresh Randolph MD;  [facility];  Service: Cardiovascular;  Laterality: Left;     Family History   Problem Relation Age of Onset    Cancer Mother          bone    Heart attacks under age 50 Brother     Diabetes Daughter     Hypertension Father     Heart disease Father     Hyperlipidemia Father     Heart attacks under age 50 Paternal Grandfather      Social History     Tobacco Use    Smoking status: Current Every Day Smoker     Packs/day: 1.00     Years: 40.00     Pack years: 40.00     Types: Cigarettes     Start date: 1978    Smokeless tobacco: Never Used   Substance Use Topics    Alcohol use: Not Currently    Drug use: No     Review of Systems   Constitutional: Negative for fever.   HENT: Negative for congestion, ear pain, rhinorrhea and sore throat.    Respiratory: Positive for cough and shortness of breath.    Cardiovascular: Positive for leg swelling. Negative for chest pain.   Gastrointestinal: Negative for abdominal pain, diarrhea, nausea and vomiting.   Genitourinary: Negative for difficulty urinating and dysuria.   Musculoskeletal: Negative for arthralgias, back pain, myalgias and neck pain.   Skin: Negative for rash and wound.   Neurological: Negative for weakness and headaches.   Psychiatric/Behavioral: Negative.        Physical Exam     Initial Vitals   BP Pulse Resp Temp SpO2   09/15/20 1458 09/15/20 1458 09/15/20 1459 09/15/20 1458 09/15/20 1458   90/68 78 (!) 22 97.3 °F (36.3 °C) 99 %      MAP       --                Physical Exam    Nursing note and vitals reviewed.  Constitutional: He appears well-developed and well-nourished. No distress.   HENT:   Head: Normocephalic and atraumatic.   Right Ear: External ear normal.   Left Ear: External ear normal.   Nose: Nose normal.   Mouth/Throat: Oropharynx is clear and moist and mucous membranes are normal.   Eyes: Conjunctivae, EOM and lids are normal. Right pupil is round. Left pupil is round. Pupils are equal.   Neck: Neck supple.   Cardiovascular: Normal rate, regular rhythm, normal heart sounds and intact distal pulses.   Pulmonary/Chest: Effort normal and breath sounds normal. Tachypnea noted.  No respiratory distress. He has no wheezes. He has no rhonchi. He has no rales. He exhibits no tenderness.   Abdominal: Soft. Normal appearance. He exhibits no distension. There is no abdominal tenderness. There is no rebound and no guarding.   Musculoskeletal: Normal range of motion.   Neurological: He is alert and oriented to person, place, and time. He has normal strength. GCS score is 15. GCS eye subscore is 4. GCS verbal subscore is 5. GCS motor subscore is 6.   Skin: Skin is warm and dry. Capillary refill takes less than 2 seconds. No rash noted.   Psychiatric: He has a normal mood and affect. His behavior is normal.         ED Course   Procedures  Labs Reviewed   CBC W/ AUTO DIFFERENTIAL - Abnormal; Notable for the following components:       Result Value    RBC 4.58 (*)     Hemoglobin 13.7 (*)     RDW 15.0 (*)     All other components within normal limits   COMPREHENSIVE METABOLIC PANEL - Abnormal; Notable for the following components:    Total Bilirubin 1.1 (*)     All other components within normal limits   TROPONIN I - Abnormal; Notable for the following components:    Troponin I 0.046 (*)     All other components within normal limits   D DIMER, QUANTITATIVE - Abnormal; Notable for the following components:    D-Dimer 0.92 (*)     All other components within normal limits   B-TYPE NATRIURETIC PEPTIDE - Abnormal; Notable for the following components:     (*)     All other components within normal limits   TROPONIN I - Abnormal; Notable for the following components:    Troponin I 0.058 (*)     All other components within normal limits   CK-MB   CK   APTT   PROTIME-INR   SARS-COV-2 RNA AMPLIFICATION, QUAL        ECG Results          EKG 12-lead (Final result)  Result time 09/15/20 15:53:22    Final result by Interface, Lab In [facility] (09/15/20 15:53:22)                 Narrative:    Test Reason : R06.02    Vent. Rate : 071 BPM     Atrial Rate : 071 BPM     P-R Int : 178 ms          QRS Dur : 102 ms       QT Int : 436 ms       P-R-T Axes : 063 189 098 degrees     QTc Int : 473 ms    Normal sinus rhythm  Right axis deviation  Possible Lateral infarct (cited on or before 15-SEP-2020)  Abnormal ECG  When compared with ECG of 16-JUN-2020 13:35,  right shift in axis  Premature ventricular complexes are no longer Present  Questionable change in initial forces of Lateral leads  Confirmed by KATERINE PURDY MD ([id number]) on 9/15/2020 3:53:15 PM    Referred By: [email or web address]   SELF           Confirmed By:KATERINE PURDY MD                            Imaging Results          CTA Chest Non-Coronary (Final result)  Result time 09/15/20 18:14:53    Final result by Patrick Vanegas Jr., MD (09/15/20 18:14:53)                 Impression:      No pulmonary embolus.    Cardiomegaly    Nonspecific enlarged subcarinal and prevascular lymph nodes.    All CT scan at this facility use dose modulation, iterative reconstruction, and/or weight base dosing when appropriate to reduce radiation dose to as low as reasonably achievable.      Electronically signed by: Patrick Vanegas Jr., MD  Date:    09/15/2020  Time:    18:14             Narrative:    EXAMINATION:  CTA CHEST NON CORONARY    CLINICAL HISTORY:  PE suspected, intermediate prob, positive D-dimer;    TECHNIQUE:  Axial CTA images performed through the chest after the administration of 100 cc intravenous contrast. Maximum intensity projections were performed and interpreted.    FINDINGS:  There is no evidence of pulmonary embolus. Pulmonary artery caliber is normal. Heart is mildly enlarged.  Left chest wall pacemaker.  Stent within the left coronary artery.  Enlarged subcarinal lymph node measuring 3.4 x 1.8 cm.  Borderline enlarged 1.5 x 1 cm prevascular lymph node.    The lungs are clear bilaterally. No pleural effusions.    The upper abdominal visualized organs are within normal limits.    The bones are intact.                               X-Ray Chest 1 View (Final result)  Result time  09/15/20 15:55:10    Final result by Jeff Love MD (09/15/20 15:55:10)                 Impression:      No acute findings.      Electronically signed by: Jeff Love MD  Date:    09/15/2020  Time:    15:55             Narrative:    EXAMINATION:  XR CHEST 1 VIEW    CLINICAL HISTORY:  Shortness of breath.,    COMPARISON:  06/15/2020    FINDINGS:  Sternal wires.  Left AICD.    Normal heart size.    Clear lungs at this time.                                           Medications   iohexoL (OMNIPAQUE 350) injection 75 mL (75 mLs Intravenous Given 9/15/20 1705)              ED Course as of Sep 15 1909   Tue Sep 15, 2020   1905 Spoke with patient's primary care physician (Dr. Gifford) - patient is in stable condition to be discharged home.  Would like to refill his Demadex.    [LG]   1906 Patient no longer feels short of breath, speaking in full sentences without distress.  Patient is 100% on room air.  Patient denies chest pain.    [LG]   1907 Upon review of laboratory results patient has had 2 troponins which were less than his most recent troponin when he was discharged from the hospital.  ER precautions given to patient who verbalized understanding.  Patient is in stable condition be discharged home.  Advised patient follow up with his primary care physician.    [LG]      ED Course User Index  [LG] Richard Haile III, MD            Clinical Impression:       ICD-10-CM ICD-9-CM   1. Shortness of breath  R06.02 786.05   2. SOB (shortness of breath)  R06.02 786.05             ED Disposition Condition    Discharge Stable        ED Prescriptions     Medication Sig Dispense Start Date End Date Auth. Provider    torsemide (DEMADEX) 10 MG Tab Take 1 tablet (10 mg total) by mouth once daily. 90 tablet 9/15/2020 9/15/2021 Richard Haile III, MD        Follow-up Information     Follow up With Specialties Details Why Contact Info    Adela Gifford MD Internal Medicine Schedule an appointment as soon as  possible for a visit   [address] [place] [phone]  [phone]                                         Richard Haile III, MD  09/15/20 [id number]

## 2020-09-15 NOTE — PROGRESS NOTES
"Subjective:       Patient ID: Miles Currie is a 54 y.o. male.    Chief Complaint: Shortness of Breath      HPI:  Patient is known to me and presents with SOB. Sx started 1 week ago and gradually worsening. + orthopnea "I can't breath when I lay flat". He has noticed increased in abdominal size over last 1 week. Weight up about 10 lbs. + LE edema but left leg is larger than right leg. He was on "blood thinners' in the past but was taken off months ago--he has h/o PVD but unclear if any h/o DVT. He has h/o systolic CHF with ICD in place. EF unknown as follows with Dr. Pittman and no TTE in our system. Denies cough or fevers or wheezing. Denies sick contacts. He was taking demedex 10mg but ran out 2 days ago--however sx started before running out of medications. He is also urinating less, typically on his meds urinated "9 times a day" but now only once, unsure if darker in color. O2 sats 97% on RA today. BP is 96/50 today but not clinically concerned for cardiogenic shock but does not renal fxn eval ASAP.     Past Medical History:   Diagnosis Date    Anticoagulant long-term use     plavix and aspiin, pletal    Anxiety and depression     Bipolar disorder     CHF (congestive heart failure)     Coronary artery disease     Diabetes mellitus     Dyslipidemia     Encounter for blood transfusion     GERD (gastroesophageal reflux disease)     History of claustrophobia     IF SOMEONE TOUCHES HIS NOSE    Hypertension     Irregular heartbeat     Ischemic cardiomyopathy     MI (myocardial infarction) 2009    multiple MI's    Multiple gastric ulcers     Pacemaker     PAD (peripheral artery disease)     Presence of automatic implantable cardioverter-defibrillator     PVD (peripheral vascular disease)        Family History   Problem Relation Age of Onset    Cancer Mother         bone    Heart attacks under age 50 Brother     Diabetes Daughter     Hypertension Father     Heart disease Father     " "Hyperlipidemia Father     Heart attacks under age 50 Paternal Grandfather        Social History     Socioeconomic History    Marital status: Legally      Spouse name: Not on file    Number of children: Not on file    Years of education: Not on file    Highest education level: Not on file   Occupational History    Not on file   Social Needs    Financial resource strain: Not on file    Food insecurity     Worry: Not on file     Inability: Not on file    Transportation needs     Medical: Not on file     Non-medical: Not on file   Tobacco Use    Smoking status: Current Every Day Smoker     Packs/day: 1.00     Years: 40.00     Pack years: 40.00     Types: Cigarettes     Start date: 1978    Smokeless tobacco: Never Used   Substance and Sexual Activity    Alcohol use: Not Currently    Drug use: No    Sexual activity: Yes   Lifestyle    Physical activity     Days per week: Not on file     Minutes per session: Not on file    Stress: Only a little   Relationships    Social connections     Talks on phone: Not on file     Gets together: Not on file     Attends [employer] service: Not on file     Active member of club or organization: Not on file     Attends meetings of clubs or organizations: Not on file     Relationship status: Not on file   Other Topics Concern    Not on file   Social History Narrative    Not on file       Review of Systems   Constitutional: Negative for activity change, fatigue, fever and unexpected weight change.   HENT: Negative for congestion, ear pain, hearing loss, rhinorrhea and sore throat.    Eyes: Negative for pain, redness and visual disturbance.   Respiratory: Positive for shortness of breath. Negative for cough and wheezing.         + orthopnea   Cardiovascular: Positive for leg swelling. Negative for chest pain and palpitations.   Gastrointestinal: Positive for abdominal distention ("swelling"). Negative for abdominal pain, constipation, diarrhea, nausea and vomiting. "   Genitourinary: Negative for decreased urine volume, dysuria, frequency and urgency.   Musculoskeletal: Negative for back pain, joint swelling and neck pain.   Skin: Negative for color change, rash and wound.   Neurological: Negative for dizziness, tremors, weakness, light-headedness and headaches.         Objective:      Physical Exam  Vitals signs reviewed.   Constitutional:       General: He is not in acute distress.     Appearance: He is well-developed.   HENT:      Head: Normocephalic and atraumatic.      Right Ear: External ear normal.      Left Ear: External ear normal.      Nose: Nose normal.      Mouth/Throat:      Mouth: Mucous membranes are moist.      Pharynx: Oropharynx is clear.   Eyes:      General:         Right eye: No discharge.         Left eye: No discharge.      Extraocular Movements: Extraocular movements intact.      Conjunctiva/sclera: Conjunctivae normal.      Pupils: Pupils are equal, round, and reactive to light.   Neck:      Musculoskeletal: Neck supple.      Thyroid: No thyromegaly.   Cardiovascular:      Rate and Rhythm: Normal rate and regular rhythm.      Heart sounds: No murmur.   Pulmonary:      Effort: Pulmonary effort is normal. No respiratory distress.      Breath sounds: Rales (soft crackle left lung base) present. No wheezing.   Abdominal:      General: Bowel sounds are normal. There is distension.      Palpations: Abdomen is soft.      Tenderness: There is no abdominal tenderness. There is no guarding or rebound.   Musculoskeletal:      Right lower leg: Edema present.      Left lower leg: Edema (1-2+ L>R swelling) present.   Lymphadenopathy:      Cervical: No cervical adenopathy.   Skin:     General: Skin is warm and dry.   Neurological:      Mental Status: He is alert and oriented to person, place, and time.      Cranial Nerves: No cranial nerve deficit.   Psychiatric:         Behavior: Behavior normal.         Assessment:       1. Acute on chronic systolic CHF (congestive  heart failure)    2. Abdominal swelling    3. Leg edema, left    4. Low urine output        Plan:       Miles was seen today for shortness of breath.    Diagnoses and all orders for this visit:    Acute on chronic systolic CHF (congestive heart failure)    Abdominal swelling    Leg edema, left    Low urine output      Patient clinically with acute on chronic systolic CHF  L LE swelling concerning for DVT as asymmetric and needs US today  CXR now  Needs labs now to eval for acute renal failure with low BP and reduced urine output (cardiogenic renal failure vs dehydration needs to be determined)  To ER now for labs: CBC, CMP, BNP, trop/CKMB, d-dimer, US L LE and CXR and likely cardiology consult. Anticipating admission pending acute work up

## 2020-09-16 NOTE — ED NOTES
Dr. Diaz in to discuss results and POC. Discharge instructions given to patient. Discussed medications prescribed and when next time due. Discussed follow up with family doctor. Patient voiced understanding.

## 2020-09-22 ENCOUNTER — OFFICE VISIT (OUTPATIENT)
Dept: INTERNAL MEDICINE | Facility: CLINIC | Age: 55
End: 2020-09-22
Payer: MEDICARE

## 2020-09-22 VITALS
HEART RATE: 72 BPM | RESPIRATION RATE: 18 BRPM | HEIGHT: 66 IN | TEMPERATURE: 97 F | OXYGEN SATURATION: 97 % | BODY MASS INDEX: 28.03 KG/M2 | SYSTOLIC BLOOD PRESSURE: 116 MMHG | WEIGHT: 174.38 LBS | DIASTOLIC BLOOD PRESSURE: 78 MMHG

## 2020-09-22 DIAGNOSIS — Z95.828 H/O AORTA-ILIAC-FEMORAL BYPASS: ICD-10-CM

## 2020-09-22 DIAGNOSIS — R14.0 NON-GASEOUS ABDOMINAL DISTENTION: ICD-10-CM

## 2020-09-22 DIAGNOSIS — N52.9 ERECTILE DYSFUNCTION, UNSPECIFIED ERECTILE DYSFUNCTION TYPE: ICD-10-CM

## 2020-09-22 DIAGNOSIS — I25.119 CORONARY ARTERY DISEASE INVOLVING NATIVE CORONARY ARTERY OF NATIVE HEART WITH ANGINA PECTORIS: ICD-10-CM

## 2020-09-22 DIAGNOSIS — I50.22 CHRONIC SYSTOLIC CONGESTIVE HEART FAILURE: ICD-10-CM

## 2020-09-22 DIAGNOSIS — I73.9 PERIPHERAL VASCULAR DISEASE, UNSPECIFIED: ICD-10-CM

## 2020-09-22 DIAGNOSIS — R91.1 PULMONARY NODULE: ICD-10-CM

## 2020-09-22 DIAGNOSIS — E11.65 UNCONTROLLED TYPE 2 DIABETES MELLITUS WITH HYPERGLYCEMIA: ICD-10-CM

## 2020-09-22 DIAGNOSIS — Z09 HOSPITAL DISCHARGE FOLLOW-UP: Primary | ICD-10-CM

## 2020-09-22 PROCEDURE — 3046F PR MOST RECENT HEMOGLOBIN A1C LEVEL > 9.0%: ICD-10-PCS | Mod: CPTII,S$GLB,, | Performed by: INTERNAL MEDICINE

## 2020-09-22 PROCEDURE — 99214 PR OFFICE/OUTPT VISIT, EST, LEVL IV, 30-39 MIN: ICD-10-PCS | Mod: S$GLB,,, | Performed by: INTERNAL MEDICINE

## 2020-09-22 PROCEDURE — 3008F BODY MASS INDEX DOCD: CPT | Mod: CPTII,S$GLB,, | Performed by: INTERNAL MEDICINE

## 2020-09-22 PROCEDURE — 99214 OFFICE O/P EST MOD 30 MIN: CPT | Mod: S$GLB,,, | Performed by: INTERNAL MEDICINE

## 2020-09-22 PROCEDURE — 99999 PR PBB SHADOW E&M-EST. PATIENT-LVL V: ICD-10-PCS | Mod: PBBFAC,HCNC,, | Performed by: INTERNAL MEDICINE

## 2020-09-22 PROCEDURE — 3046F HEMOGLOBIN A1C LEVEL >9.0%: CPT | Mod: CPTII,S$GLB,, | Performed by: INTERNAL MEDICINE

## 2020-09-22 PROCEDURE — 99999 PR PBB SHADOW E&M-EST. PATIENT-LVL V: CPT | Mod: PBBFAC,HCNC,, | Performed by: INTERNAL MEDICINE

## 2020-09-22 PROCEDURE — 3008F PR BODY MASS INDEX (BMI) DOCUMENTED: ICD-10-PCS | Mod: CPTII,S$GLB,, | Performed by: INTERNAL MEDICINE

## 2020-09-22 RX ORDER — TADALAFIL 10 MG/1
10 TABLET ORAL DAILY PRN
Qty: 10 TABLET | Refills: 0 | Status: SHIPPED | OUTPATIENT
Start: 2020-09-22 | End: 2020-10-06 | Stop reason: SDUPTHER

## 2020-09-22 NOTE — PROGRESS NOTES
Subjective:       Patient ID: Miles Currie is a 54 y.o. male.    Chief Complaint: Follow-up      HPI:  Patient is known to me and presents for ER follow up. He was seen by me and sent to ER for work up on 9/15/2020 when he presented with SOB, LE edema and orthopnea concerning for CHF exacerbation. He has run out of Demadex though sx had started worsening prior to this. He was not hypoxia with Os sats > 95% on RA. CXR and CTA without findings of significant pulmonary edema and no PE. EKG without acute ST changes. Trop x2 negative. GFR > 60. . He was discharged with refill of demadex. Today he reports LE edema is better. He also reports his breathing is better. However feels he still has fluid in the abdomen. He still feels SOB and has coughing when he lays flat. T bili 1.1 and has been mildly elevated but < 2 for 3 months. LFTs mildly elevated but normal on ER labs.     He is past due for DM follow up. Reports home blood sugars < 150 when he checks. Denies sx of LE claudications today.      Past Medical History:   Diagnosis Date    Anticoagulant long-term use     plavix and aspiin, pletal    Anxiety and depression     Bipolar disorder     CHF (congestive heart failure)     Coronary artery disease     Diabetes mellitus     Dyslipidemia     Encounter for blood transfusion     GERD (gastroesophageal reflux disease)     History of claustrophobia     IF SOMEONE TOUCHES HIS NOSE    Hypertension     Irregular heartbeat     Ischemic cardiomyopathy     MI (myocardial infarction) 2009    multiple MI's    Multiple gastric ulcers     Pacemaker     PAD (peripheral artery disease)     Presence of automatic implantable cardioverter-defibrillator     PVD (peripheral vascular disease)        Family History   Problem Relation Age of Onset    Cancer Mother         bone    Heart attacks under age 50 Brother     Diabetes Daughter     Hypertension Father     Heart disease Father     Hyperlipidemia Father      Heart attacks under age 50 Paternal Grandfather        Social History     Socioeconomic History    Marital status: Legally      Spouse name: Not on file    Number of children: Not on file    Years of education: Not on file    Highest education level: Not on file   Occupational History    Not on file   Social Needs    Financial resource strain: Not on file    Food insecurity     Worry: Not on file     Inability: Not on file    Transportation needs     Medical: Not on file     Non-medical: Not on file   Tobacco Use    Smoking status: Current Every Day Smoker     Packs/day: 1.00     Years: 40.00     Pack years: 40.00     Types: Cigarettes     Start date: 1978    Smokeless tobacco: Never Used   Substance and Sexual Activity    Alcohol use: Not Currently    Drug use: No    Sexual activity: Yes   Lifestyle    Physical activity     Days per week: Not on file     Minutes per session: Not on file    Stress: Only a little   Relationships    Social connections     Talks on phone: Not on file     Gets together: Not on file     Attends Taoist service: Not on file     Active member of club or organization: Not on file     Attends meetings of clubs or organizations: Not on file     Relationship status: Not on file   Other Topics Concern    Not on file   Social History Narrative    Not on file       Review of Systems   Constitutional: Negative for activity change, fatigue, fever and unexpected weight change.   HENT: Negative for congestion, ear pain, hearing loss, rhinorrhea and sore throat.    Eyes: Negative for redness and visual disturbance.   Respiratory: Positive for shortness of breath. Negative for cough and wheezing.    Cardiovascular: Negative for chest pain, palpitations and leg swelling.   Gastrointestinal: Positive for abdominal distention. Negative for abdominal pain, constipation, diarrhea, nausea and vomiting.   Genitourinary: Negative for decreased urine volume, dysuria, frequency  and urgency.   Musculoskeletal: Negative for back pain, joint swelling and neck pain.   Skin: Negative for color change, rash and wound.   Neurological: Negative for dizziness, tremors, weakness, light-headedness and headaches.         Objective:      Physical Exam  Vitals signs reviewed.   Constitutional:       General: He is not in acute distress.     Appearance: He is well-developed.   HENT:      Head: Normocephalic and atraumatic.      Right Ear: External ear normal.      Left Ear: External ear normal.      Nose: Nose normal.   Eyes:      General: No scleral icterus.     Extraocular Movements: Extraocular movements intact.      Conjunctiva/sclera: Conjunctivae normal.      Pupils: Pupils are equal, round, and reactive to light.   Neck:      Musculoskeletal: Neck supple.      Thyroid: No thyromegaly.   Cardiovascular:      Rate and Rhythm: Normal rate and regular rhythm.      Pulses:           Dorsalis pedis pulses are 1+ on the right side and 0 on the left side.        Posterior tibial pulses are 1+ on the right side and 1+ on the left side.      Heart sounds: No murmur. No friction rub. No gallop.    Pulmonary:      Effort: Pulmonary effort is normal. No respiratory distress.      Breath sounds: Normal breath sounds. No wheezing or rales.   Abdominal:      General: Bowel sounds are normal. There is distension.      Palpations: Abdomen is soft.      Tenderness: There is no abdominal tenderness. There is no guarding or rebound.   Musculoskeletal:      Right foot: Normal range of motion. No deformity.      Left foot: Normal range of motion. No deformity.   Feet:      Right foot:      Protective Sensation: 10 sites tested. 9 sites sensed.      Skin integrity: No ulcer, blister or callus.      Toenail Condition: Right toenails are normal.      Left foot:      Protective Sensation: 10 sites tested. 8 sites sensed.      Skin integrity: No ulcer, blister or callus.      Toenail Condition: Left toenails are normal.    Lymphadenopathy:      Cervical: No cervical adenopathy.   Skin:     General: Skin is warm and dry.   Neurological:      Mental Status: He is alert and oriented to person, place, and time.      Cranial Nerves: No cranial nerve deficit.   Psychiatric:         Behavior: Behavior normal.         Assessment:       1. Hospital discharge follow-up    2. Chronic systolic congestive heart failure    3. Coronary artery disease involving native coronary artery of native heart with angina pectoris    4. Peripheral vascular disease, unspecified    5. H/O aorta-iliac-femoral bypass    6. Uncontrolled type 2 diabetes mellitus with hyperglycemia    7. Non-gaseous abdominal distention    8. Pulmonary nodule    9. Erectile dysfunction, unspecified erectile dysfunction type        Plan:       Miles was seen today for follow-up.    Diagnoses and all orders for this visit:    Hospital discharge follow-up  ER notes, labs and imaging reviewed today    Chronic systolic congestive heart failure  -     CBC auto differential; Future  -     Comprehensive metabolic panel; Future  -     Lipid Panel; Future  Agree ER work up not revealing for significant CHF exacerbation  Cont demedex and aldactone  Needs to follow up with Dr. Reyes ASAP--states he will schedule appointment    Coronary artery disease involving native coronary artery of native heart with angina pectoris  -     CBC auto differential; Future  -     Comprehensive metabolic panel; Future  -     Lipid Panel; Future  -     Hemoglobin A1C; Future  Chronic stable  Cont statin  Needs updated labs for lipid and DM now  Denies angina sx  Cont plavx, ASA    Peripheral vascular disease, unspecified  -     CBC auto differential; Future  -     Comprehensive metabolic panel; Future  -     Lipid Panel; Future  -     Hemoglobin A1C; Future  Chronic stable  Needs cardiology follow up now  Cont statin, plavix and ASA  He tells me was on Eliquis per Dr. Pittman but Dr. Reyes stopped it and wants to  know if he should get back on this. As I don't have complete records he needs to discuss with cardiology and states he will make an appointment    H/O aorta-iliac-femoral bypass  -     CBC auto differential; Future  -     Comprehensive metabolic panel; Future  -     Lipid Panel; Future  -     Hemoglobin A1C; Future  Chronic stable  Needs cardiology follow up now  Cont statin, plavix and ASA  He tells me was on Eliquis per Dr. Pittman but Dr. Reyes stopped it and wants to know if he should get back on this. As I don't have complete records he needs to discuss with cardiology and states he will make an appointment      Uncontrolled type 2 diabetes mellitus with hyperglycemia  -     Foot Exam Performed  -     CBC auto differential; Future  -     Comprehensive metabolic panel; Future  -     Lipid Panel; Future  -     TSH; Future  -     Hemoglobin A1C; Future  -     Microalbumin/creatinine urine ratio; Future  -     Ambulatory referral/consult to Ophthalmology; Future  Chronic  Past due for labs, will do and see me gain 2 weeks for follow up  Cont meds same dose pending labs  1800 tamar ADA diet  Check sugars and keep log  Foot exam today  Referred to optho    Non-gaseous abdominal distention  -     US Abdomen Complete; Future  New  Worsening  No peripheral edema or pulmonary edema on exam or imaging today (or ER visit) but still with abdominal distention  Mild bilirubin elevation  Check US abdomen for etiology  LFTs, albumin and INR normal however so cirrhosis less likely  Maybe portal vein issue????    Pulmonary nodule  -     CT Chest With Contrast; Future  Noted on prior imaging  Now with enlarged subcarinal lymph node  Repeat CT ordered for Dec 2020    Erectile dysfunction, unspecified erectile dysfunction type  -     tadalafiL (CIALIS) 10 MG tablet; Take 1 tablet (10 mg total) by mouth daily as needed for Erectile Dysfunction.  New, worsening  Trial friend's cialis with good effect and no side effects  Spent > 10 mins  "discussing side effects of this medication and careful with cardiac history. ER precuations discussed. Voiced understanding  "If I'm going to die I want to die on top of a women" so he is well aware of risks    RTC 2 weeks with labs and PRN          "

## 2020-10-05 ENCOUNTER — PATIENT MESSAGE (OUTPATIENT)
Dept: ADMINISTRATIVE | Facility: HOSPITAL | Age: 55
End: 2020-10-05

## 2020-10-06 ENCOUNTER — PATIENT MESSAGE (OUTPATIENT)
Dept: INTERNAL MEDICINE | Facility: CLINIC | Age: 55
End: 2020-10-06

## 2020-10-06 ENCOUNTER — OFFICE VISIT (OUTPATIENT)
Dept: INTERNAL MEDICINE | Facility: CLINIC | Age: 55
End: 2020-10-06
Payer: MEDICARE

## 2020-10-06 ENCOUNTER — LAB VISIT (OUTPATIENT)
Dept: LAB | Facility: HOSPITAL | Age: 55
End: 2020-10-06
Attending: INTERNAL MEDICINE
Payer: MEDICARE

## 2020-10-06 VITALS
TEMPERATURE: 97 F | HEIGHT: 66 IN | SYSTOLIC BLOOD PRESSURE: 118 MMHG | DIASTOLIC BLOOD PRESSURE: 64 MMHG | WEIGHT: 177 LBS | OXYGEN SATURATION: 94 % | HEART RATE: 74 BPM | RESPIRATION RATE: 17 BRPM | BODY MASS INDEX: 28.45 KG/M2

## 2020-10-06 DIAGNOSIS — Z95.828 H/O AORTA-ILIAC-FEMORAL BYPASS: ICD-10-CM

## 2020-10-06 DIAGNOSIS — I50.22 CHRONIC SYSTOLIC CONGESTIVE HEART FAILURE: Primary | ICD-10-CM

## 2020-10-06 DIAGNOSIS — Z23 NEED FOR VACCINATION: ICD-10-CM

## 2020-10-06 DIAGNOSIS — I50.22 CHRONIC SYSTOLIC CONGESTIVE HEART FAILURE: ICD-10-CM

## 2020-10-06 DIAGNOSIS — I25.119 CORONARY ARTERY DISEASE INVOLVING NATIVE CORONARY ARTERY OF NATIVE HEART WITH ANGINA PECTORIS: ICD-10-CM

## 2020-10-06 DIAGNOSIS — Z11.59 NEED FOR HEPATITIS C SCREENING TEST: ICD-10-CM

## 2020-10-06 DIAGNOSIS — E11.65 UNCONTROLLED TYPE 2 DIABETES MELLITUS WITH HYPERGLYCEMIA: ICD-10-CM

## 2020-10-06 DIAGNOSIS — Z11.4 ENCOUNTER FOR SCREENING FOR HIV: ICD-10-CM

## 2020-10-06 DIAGNOSIS — N52.9 ERECTILE DYSFUNCTION, UNSPECIFIED ERECTILE DYSFUNCTION TYPE: ICD-10-CM

## 2020-10-06 DIAGNOSIS — Z12.5 PROSTATE CANCER SCREENING: ICD-10-CM

## 2020-10-06 DIAGNOSIS — E78.5 HYPERLIPIDEMIA, UNSPECIFIED HYPERLIPIDEMIA TYPE: ICD-10-CM

## 2020-10-06 DIAGNOSIS — I73.9 PERIPHERAL VASCULAR DISEASE, UNSPECIFIED: ICD-10-CM

## 2020-10-06 DIAGNOSIS — R91.1 PULMONARY NODULE, RIGHT: ICD-10-CM

## 2020-10-06 DIAGNOSIS — Z72.0 TOBACCO ABUSE: ICD-10-CM

## 2020-10-06 DIAGNOSIS — I73.9 PAD (PERIPHERAL ARTERY DISEASE): ICD-10-CM

## 2020-10-06 LAB
ALBUMIN SERPL BCP-MCNC: 4.3 G/DL (ref 3.5–5.2)
ALBUMIN/CREAT UR: 140.2 UG/MG (ref 0–30)
ALP SERPL-CCNC: 136 U/L (ref 55–135)
ALT SERPL W/O P-5'-P-CCNC: 26 U/L (ref 10–44)
ANION GAP SERPL CALC-SCNC: 13 MMOL/L (ref 8–16)
AST SERPL-CCNC: 18 U/L (ref 10–40)
BASOPHILS # BLD AUTO: 0.05 K/UL (ref 0–0.2)
BASOPHILS NFR BLD: 0.8 % (ref 0–1.9)
BILIRUB SERPL-MCNC: 1.1 MG/DL (ref 0.1–1)
BUN SERPL-MCNC: 14 MG/DL (ref 6–20)
CALCIUM SERPL-MCNC: 10.6 MG/DL (ref 8.7–10.5)
CHLORIDE SERPL-SCNC: 99 MMOL/L (ref 95–110)
CHOLEST SERPL-MCNC: 296 MG/DL (ref 120–199)
CHOLEST/HDLC SERPL: 7.8 {RATIO} (ref 2–5)
CO2 SERPL-SCNC: 28 MMOL/L (ref 23–29)
COMPLEXED PSA SERPL-MCNC: 1.2 NG/ML (ref 0–4)
CREAT SERPL-MCNC: 1.1 MG/DL (ref 0.5–1.4)
CREAT UR-MCNC: 21.4 MG/DL (ref 23–375)
DIFFERENTIAL METHOD: ABNORMAL
EOSINOPHIL # BLD AUTO: 0.2 K/UL (ref 0–0.5)
EOSINOPHIL NFR BLD: 2.8 % (ref 0–8)
ERYTHROCYTE [DISTWIDTH] IN BLOOD BY AUTOMATED COUNT: 15.1 % (ref 11.5–14.5)
EST. GFR  (AFRICAN AMERICAN): >60 ML/MIN/1.73 M^2
EST. GFR  (NON AFRICAN AMERICAN): >60 ML/MIN/1.73 M^2
ESTIMATED AVG GLUCOSE: 171 MG/DL (ref 68–131)
GLUCOSE SERPL-MCNC: 169 MG/DL (ref 70–110)
HBA1C MFR BLD HPLC: 7.6 % (ref 4–5.6)
HCT VFR BLD AUTO: 49 % (ref 40–54)
HDLC SERPL-MCNC: 38 MG/DL (ref 40–75)
HDLC SERPL: 12.8 % (ref 20–50)
HGB BLD-MCNC: 16 G/DL (ref 14–18)
IMM GRANULOCYTES # BLD AUTO: 0.03 K/UL (ref 0–0.04)
IMM GRANULOCYTES NFR BLD AUTO: 0.5 % (ref 0–0.5)
LDLC SERPL CALC-MCNC: 183.4 MG/DL (ref 63–159)
LYMPHOCYTES # BLD AUTO: 1.2 K/UL (ref 1–4.8)
LYMPHOCYTES NFR BLD: 20.4 % (ref 18–48)
MCH RBC QN AUTO: 30.5 PG (ref 27–31)
MCHC RBC AUTO-ENTMCNC: 32.7 G/DL (ref 32–36)
MCV RBC AUTO: 94 FL (ref 82–98)
MICROALBUMIN UR DL<=1MG/L-MCNC: 30 UG/ML
MONOCYTES # BLD AUTO: 0.5 K/UL (ref 0.3–1)
MONOCYTES NFR BLD: 7.8 % (ref 4–15)
NEUTROPHILS # BLD AUTO: 4.1 K/UL (ref 1.8–7.7)
NEUTROPHILS NFR BLD: 67.7 % (ref 38–73)
NONHDLC SERPL-MCNC: 258 MG/DL
NRBC BLD-RTO: 0 /100 WBC
PLATELET # BLD AUTO: 223 K/UL (ref 150–350)
PMV BLD AUTO: 9.4 FL (ref 9.2–12.9)
POTASSIUM SERPL-SCNC: 4.6 MMOL/L (ref 3.5–5.1)
PROT SERPL-MCNC: 8.5 G/DL (ref 6–8.4)
RBC # BLD AUTO: 5.24 M/UL (ref 4.6–6.2)
SODIUM SERPL-SCNC: 140 MMOL/L (ref 136–145)
TRIGL SERPL-MCNC: 373 MG/DL (ref 30–150)
TSH SERPL DL<=0.005 MIU/L-ACNC: 2.58 UIU/ML (ref 0.4–4)
WBC # BLD AUTO: 6.02 K/UL (ref 3.9–12.7)

## 2020-10-06 PROCEDURE — 83036 HEMOGLOBIN GLYCOSYLATED A1C: CPT

## 2020-10-06 PROCEDURE — 80061 LIPID PANEL: CPT

## 2020-10-06 PROCEDURE — 90732 PPSV23 VACC 2 YRS+ SUBQ/IM: CPT | Mod: S$GLB,,, | Performed by: INTERNAL MEDICINE

## 2020-10-06 PROCEDURE — 84443 ASSAY THYROID STIM HORMONE: CPT

## 2020-10-06 PROCEDURE — 90686 FLU VACCINE (QUAD) GREATER THAN OR EQUAL TO 3YO PRESERVATIVE FREE IM: ICD-10-PCS | Mod: S$GLB,,, | Performed by: INTERNAL MEDICINE

## 2020-10-06 PROCEDURE — 99215 PR OFFICE/OUTPT VISIT, EST, LEVL V, 40-54 MIN: ICD-10-PCS | Mod: 25,S$GLB,, | Performed by: INTERNAL MEDICINE

## 2020-10-06 PROCEDURE — G0009 ADMIN PNEUMOCOCCAL VACCINE: HCPCS | Mod: S$GLB,,, | Performed by: INTERNAL MEDICINE

## 2020-10-06 PROCEDURE — 84153 ASSAY OF PSA TOTAL: CPT

## 2020-10-06 PROCEDURE — 99999 PR PBB SHADOW E&M-EST. PATIENT-LVL V: CPT | Mod: PBBFAC,,, | Performed by: INTERNAL MEDICINE

## 2020-10-06 PROCEDURE — 3008F PR BODY MASS INDEX (BMI) DOCUMENTED: ICD-10-PCS | Mod: CPTII,S$GLB,, | Performed by: INTERNAL MEDICINE

## 2020-10-06 PROCEDURE — 3046F HEMOGLOBIN A1C LEVEL >9.0%: CPT | Mod: CPTII,S$GLB,, | Performed by: INTERNAL MEDICINE

## 2020-10-06 PROCEDURE — G0008 ADMIN INFLUENZA VIRUS VAC: HCPCS | Mod: S$GLB,,, | Performed by: INTERNAL MEDICINE

## 2020-10-06 PROCEDURE — G0009 PNEUMOCOCCAL POLYSACCHARIDE VACCINE 23-VALENT =>2YO SQ IM: ICD-10-PCS | Mod: S$GLB,,, | Performed by: INTERNAL MEDICINE

## 2020-10-06 PROCEDURE — 3008F BODY MASS INDEX DOCD: CPT | Mod: CPTII,S$GLB,, | Performed by: INTERNAL MEDICINE

## 2020-10-06 PROCEDURE — 80053 COMPREHEN METABOLIC PANEL: CPT

## 2020-10-06 PROCEDURE — 86803 HEPATITIS C AB TEST: CPT

## 2020-10-06 PROCEDURE — 36415 COLL VENOUS BLD VENIPUNCTURE: CPT

## 2020-10-06 PROCEDURE — 82043 UR ALBUMIN QUANTITATIVE: CPT

## 2020-10-06 PROCEDURE — 3046F PR MOST RECENT HEMOGLOBIN A1C LEVEL > 9.0%: ICD-10-PCS | Mod: CPTII,S$GLB,, | Performed by: INTERNAL MEDICINE

## 2020-10-06 PROCEDURE — 85025 COMPLETE CBC W/AUTO DIFF WBC: CPT

## 2020-10-06 PROCEDURE — 90732 PNEUMOCOCCAL POLYSACCHARIDE VACCINE 23-VALENT =>2YO SQ IM: ICD-10-PCS | Mod: S$GLB,,, | Performed by: INTERNAL MEDICINE

## 2020-10-06 PROCEDURE — 86703 HIV-1/HIV-2 1 RESULT ANTBDY: CPT

## 2020-10-06 PROCEDURE — G0008 FLU VACCINE (QUAD) GREATER THAN OR EQUAL TO 3YO PRESERVATIVE FREE IM: ICD-10-PCS | Mod: S$GLB,,, | Performed by: INTERNAL MEDICINE

## 2020-10-06 PROCEDURE — 90686 IIV4 VACC NO PRSV 0.5 ML IM: CPT | Mod: S$GLB,,, | Performed by: INTERNAL MEDICINE

## 2020-10-06 PROCEDURE — 99215 OFFICE O/P EST HI 40 MIN: CPT | Mod: 25,S$GLB,, | Performed by: INTERNAL MEDICINE

## 2020-10-06 PROCEDURE — 99999 PR PBB SHADOW E&M-EST. PATIENT-LVL V: ICD-10-PCS | Mod: PBBFAC,,, | Performed by: INTERNAL MEDICINE

## 2020-10-06 RX ORDER — ROSUVASTATIN CALCIUM 40 MG/1
40 TABLET, COATED ORAL NIGHTLY
Qty: 90 TABLET | Refills: 3 | Status: SHIPPED | OUTPATIENT
Start: 2020-10-06 | End: 2020-11-30 | Stop reason: SDUPTHER

## 2020-10-06 RX ORDER — ROSUVASTATIN CALCIUM 10 MG/1
10 TABLET, COATED ORAL NIGHTLY
Qty: 90 TABLET | Refills: 3 | Status: SHIPPED | OUTPATIENT
Start: 2020-10-06 | End: 2020-10-06

## 2020-10-06 RX ORDER — TADALAFIL 10 MG/1
20 TABLET ORAL DAILY PRN
Qty: 20 TABLET | Refills: 0 | Status: SHIPPED | OUTPATIENT
Start: 2020-10-06 | End: 2020-12-09 | Stop reason: SDUPTHER

## 2020-10-06 NOTE — PROGRESS NOTES
"Subjective:       Patient ID: Miles Currie is a 54 y.o. male.    Chief Complaint: Follow-up      HPI:  Patient is known to me and presents for follow up chronic medicla problems. He has systolic CHF with ICD, CAD s/p 3v CABG, HTN, HLD, systolic CHF, PAD s/p aortibifem by pass and multiple peripheral interventions (last 1/2020 mechanical thrombectomyto fem-fem graft) and DM type 2. He did not do his labs prior to today's visit and is well past due for repeat labs.      DM type 2: Last A1C 12.5% 6/2020. We resumed Lantus 25 units BID, saxagliptin 5mg. He reports his blood sugars are now in the 120s on average at home. GFR normal. Denies sx of neuorpathy. No polyuria, polydipsia.    foot exam: 9/2020  Eye exam: ordered, not done yet  Microalb: ordered     PAD: discharge summary from Dr. Pittman 1/2020 reviewed. Appears to have severe PAD. Per Dr. Pittman on eliquis, plavix, ASA but patient states Dr. Reyes didn't want him taking the Eliquis. He is still smoking, not ready to quit. No current claudication sx. Distal right 3rd finger amputated after infection.      HLD: on atorvastatin 80mg and lovaza. Unsure his current LDL; last in our system 2011 was 161. Labs will be done today Denies medication side effects and reports compliance.      CHF: "my heart only functions at 11%"; I have no prior TTE in our system. He has a ICD in place. He typically follows with Dr. Reyes but has not seen him in "several months". He denies PIERRE, LE edema. On Entresto and Coreg; torsemide and aldactone for diuretics. Still having mild LE edema but improved from last visit and abdominal distention making him feel SOB. He missed his abd US, will reschedule. Last LFTs normalized, repeat labs today pending.       CAD: s/p 3v CABG and 3 stents s/p MI. On plavix, ASA, atorvastatin, and BB. Unsure when last stress test done. He still has not had cardiology follow up. Denies exertional chest pains last few months. Still smoking.        Past " Medical History:   Diagnosis Date    Anticoagulant long-term use     plavix and aspiin, pletal    Anxiety and depression     Bipolar disorder     CHF (congestive heart failure)     Coronary artery disease     Diabetes mellitus     Dyslipidemia     Encounter for blood transfusion     GERD (gastroesophageal reflux disease)     History of claustrophobia     IF SOMEONE TOUCHES HIS NOSE    Hypertension     Irregular heartbeat     Ischemic cardiomyopathy     MI (myocardial infarction) 2009    multiple MI's    Multiple gastric ulcers     Pacemaker     PAD (peripheral artery disease)     Presence of automatic implantable cardioverter-defibrillator     PVD (peripheral vascular disease)        Family History   Problem Relation Age of Onset    Cancer Mother         bone    Heart attacks under age 50 Brother     Diabetes Daughter     Hypertension Father     Heart disease Father     Hyperlipidemia Father     Heart attacks under age 50 Paternal Grandfather        Social History     Socioeconomic History    Marital status: Legally      Spouse name: Not on file    Number of children: Not on file    Years of education: Not on file    Highest education level: Not on file   Occupational History    Not on file   Social Needs    Financial resource strain: Not on file    Food insecurity     Worry: Not on file     Inability: Not on file    Transportation needs     Medical: Not on file     Non-medical: Not on file   Tobacco Use    Smoking status: Current Every Day Smoker     Packs/day: 1.00     Years: 40.00     Pack years: 40.00     Types: Cigarettes     Start date: 1978    Smokeless tobacco: Never Used   Substance and Sexual Activity    Alcohol use: Not Currently    Drug use: No    Sexual activity: Yes   Lifestyle    Physical activity     Days per week: Not on file     Minutes per session: Not on file    Stress: Only a little   Relationships    Social connections     Talks on phone: Not  on file     Gets together: Not on file     Attends Orthodox service: Not on file     Active member of club or organization: Not on file     Attends meetings of clubs or organizations: Not on file     Relationship status: Not on file   Other Topics Concern    Not on file   Social History Narrative    Not on file       Review of Systems   Constitutional: Negative for activity change, fatigue, fever and unexpected weight change.   HENT: Negative for congestion, ear pain, hearing loss, rhinorrhea and sore throat.    Eyes: Negative for pain, redness and visual disturbance.   Respiratory: Negative for cough, shortness of breath and wheezing.    Cardiovascular: Positive for leg swelling (mild). Negative for chest pain and palpitations.   Gastrointestinal: Positive for abdominal pain. Negative for constipation, diarrhea, nausea and vomiting.   Genitourinary: Negative for decreased urine volume, dysuria, frequency and urgency.   Musculoskeletal: Negative for back pain, joint swelling and neck pain.   Skin: Negative for color change, rash and wound.   Neurological: Negative for dizziness, tremors, weakness, light-headedness and headaches.         Objective:      Physical Exam  Vitals signs reviewed.   Constitutional:       General: He is not in acute distress.     Appearance: He is well-developed.   HENT:      Head: Normocephalic and atraumatic.      Right Ear: External ear normal.      Left Ear: External ear normal.      Nose: Nose normal.      Mouth/Throat:      Mouth: Mucous membranes are moist.      Pharynx: Oropharynx is clear.   Eyes:      General: No scleral icterus.     Extraocular Movements: Extraocular movements intact.      Conjunctiva/sclera: Conjunctivae normal.      Pupils: Pupils are equal, round, and reactive to light.   Neck:      Musculoskeletal: Neck supple.      Thyroid: No thyromegaly.   Cardiovascular:      Rate and Rhythm: Normal rate and regular rhythm.      Heart sounds: No murmur.   Pulmonary:       Effort: Pulmonary effort is normal. No respiratory distress.      Breath sounds: Normal breath sounds. No wheezing or rales.   Abdominal:      General: Bowel sounds are normal. There is distension.      Palpations: Abdomen is soft.      Tenderness: There is no abdominal tenderness. There is no guarding or rebound.   Musculoskeletal:      Right lower leg: No edema.      Left lower leg: Edema (trace) present.   Lymphadenopathy:      Cervical: No cervical adenopathy.   Skin:     General: Skin is warm and dry.   Neurological:      Mental Status: He is alert and oriented to person, place, and time.      Cranial Nerves: No cranial nerve deficit.   Psychiatric:         Behavior: Behavior normal.         Assessment:       1. Chronic systolic congestive heart failure    2. Coronary artery disease involving native coronary artery of native heart with angina pectoris    3. PAD (peripheral artery disease)    4. H/O aorta-iliac-femoral bypass    5. Hyperlipidemia, unspecified hyperlipidemia type    6. Uncontrolled type 2 diabetes mellitus with hyperglycemia    7. Tobacco abuse    8. Pulmonary nodule, right    9. Erectile dysfunction, unspecified erectile dysfunction type    10. Encounter for screening for HIV    11. Need for vaccination        Plan:       Miles was seen today for follow-up.    Diagnoses and all orders for this visit:    Chronic systolic congestive heart failure  -     (In Office Administered) Pneumococcal Polysaccharide Vaccine (23 Valent) (SQ/IM)  -     CBC auto differential; Future  -     Comprehensive Metabolic Panel; Future  -     TSH; Future  -     Lipid Panel; Future  -     Hemoglobin A1C; Future  -     Magnesium; Future  Chronic stable  + abd distention concerning for fluid overload but no other peripheral edema  abd US rescheduled today  Cont diuretics--may adjust doses pending labs today  MUST FOLLOW UP CARDIOLOGY!! States he will call today to make appointment with Dr. Reyes Cont BB    Coronary  artery disease involving native coronary artery of native heart with angina pectoris  -     CBC auto differential; Future  -     Comprehensive Metabolic Panel; Future  -     TSH; Future  -     Lipid Panel; Future  -     Hemoglobin A1C; Future  -     Magnesium; Future  Chronic stable  Denies exertional chest pains  MUST FOLLOW UP CARDIOLOGY!! States he will call today to make appointment with Dr. Reyes  Cont Bb, ASA, Plavix and statin  Labs today    PAD (peripheral artery disease)  -     CBC auto differential; Future  -     Comprehensive Metabolic Panel; Future  -     TSH; Future  -     Lipid Panel; Future  -     Hemoglobin A1C; Future  -     Magnesium; Future  Chronic stable  MUST FOLLOW UP DR. HELMS. Cont meds per Dr. Helms: ASA, plavix, statin----unclear if he should be on the eliquis which is why he needs the follow up now  Stop smoking    H/O aorta-iliac-femoral bypass  -     CBC auto differential; Future  -     Comprehensive Metabolic Panel; Future  -     TSH; Future  -     Lipid Panel; Future  -     Hemoglobin A1C; Future  -     Magnesium; Future  Chronic stable  MUST FOLLOW UP DR. HELMS. Cont meds per Dr. Helms: ASA, plavix, statin----unclear if he should be on the eliquis which is why he needs the follow up now  Stop smoking      Hyperlipidemia, unspecified hyperlipidemia type  -     CBC auto differential; Future  -     Comprehensive Metabolic Panel; Future  -     TSH; Future  -     Lipid Panel; Future  -     Hemoglobin A1C; Future  -     Magnesium; Future  Chronic uncontrolled per last labs  Update labs today  Cont statin, lovaza pending labs today  Diet, exercise, weight loss    Uncontrolled type 2 diabetes mellitus with hyperglycemia  -     (In Office Administered) Pneumococcal Polysaccharide Vaccine (23 Valent) (SQ/IM)  -     CBC auto differential; Future  -     Comprehensive Metabolic Panel; Future  -     TSH; Future  -     Lipid Panel; Future  -     Hemoglobin A1C; Future  -     Magnesium;  Future  Chronic uncontrolled per last labs  Home blood sugars have normalized so expect today's labs to improve  Cont meds same odse pending labs  1800 tamar ADA diet  Check sugars and keep log    Tobacco abuse  -     (In Office Administered) Pneumococcal Polysaccharide Vaccine (23 Valent) (SQ/IM)  Cessation discussed, not ready to quit    Pulmonary nodule, right  Noted on prior imaging  Now with enlarged subcarinal lymph node  Repeat CT ordered for Dec 2020    Erectile dysfunction, unspecified erectile dysfunction type  -     tadalafiL (CIALIS) 10 MG tablet; Take 2 tablets (20 mg total) by mouth daily as needed for Erectile Dysfunction.  -     TSH; Future  meds refilled, working well  Denies side effects    Encounter for screening for HIV  -     HIV 1/2 Ag/Ab (4th Gen); Future    Need for vaccination  -     (In Office Administered) Pneumococcal Polysaccharide Vaccine (23 Valent) (SQ/IM)  Flu vaccine today    RTC 6 months with labs and PRN pending fasting labs today

## 2020-10-06 NOTE — TELEPHONE ENCOUNTER
Please notify patient his cholesterol is still very elevated. I want to change his atorvastatin to crestor 40mg daily. Sent in new prescription. Still waiting on his A1C, will let him know. He needs to see cardiology ASAP as well

## 2020-10-08 LAB
HCV AB SERPL QL IA: NEGATIVE
HIV 1+2 AB+HIV1 P24 AG SERPL QL IA: NEGATIVE

## 2020-10-16 ENCOUNTER — HOSPITAL ENCOUNTER (OUTPATIENT)
Dept: RADIOLOGY | Facility: HOSPITAL | Age: 55
Discharge: HOME | End: 2020-10-16
Attending: INTERNAL MEDICINE
Payer: MEDICARE

## 2020-10-16 DIAGNOSIS — R14.0 NON-GASEOUS ABDOMINAL DISTENTION: ICD-10-CM

## 2020-10-16 PROCEDURE — 76700 US EXAM ABDOM COMPLETE: CPT | Mod: 26,,, | Performed by: RADIOLOGY

## 2020-10-16 PROCEDURE — 76700 US ABDOMEN COMPLETE: ICD-10-PCS | Mod: 26,,, | Performed by: RADIOLOGY

## 2020-10-16 PROCEDURE — 76700 US EXAM ABDOM COMPLETE: CPT | Mod: TC

## 2020-10-29 DIAGNOSIS — E11.65 UNCONTROLLED TYPE 2 DIABETES MELLITUS WITH HYPERGLYCEMIA: ICD-10-CM

## 2020-10-29 NOTE — TELEPHONE ENCOUNTER
"----- Message from Anita Gómez sent at 10/29/2020  4:04 PM CDT -----  Miles Currie  MRN: [id number]  : 1965  PCP: Adela Gifford  Home Phone      [phone]  Work Phone      Not on file.  Mobile          [phone]  Home Phone      [phone]      MESSAGE:   Pt requesting refill or new Rx.   Is this a refill or new RX:  refill  RX name and strength: insulin syringe-needle U-100 1 mL 29 gauge x 1/2" Marlon  Last office visit: 10/06/2020  Is this a 30-day or 90-day RX:  30  Pharmacy name and location:  [facility]  Comments:            "

## 2020-10-29 NOTE — TELEPHONE ENCOUNTER
"Requested Prescriptions     Pending Prescriptions Disp Refills    insulin syringe-needle U-100 1 mL 29 gauge x 1/2" Syrg 100 each 0     Sig: BID       "

## 2020-10-30 RX ORDER — SYRINGE,SAFETY WITH NEEDLE,1ML 25GX1"
SYRINGE (EA) MISCELLANEOUS
Qty: 100 EACH | Refills: 0 | Status: SHIPPED | OUTPATIENT
Start: 2020-10-30 | End: 2020-12-31 | Stop reason: SDUPTHER

## 2020-11-13 DIAGNOSIS — F41.9 ANXIETY: ICD-10-CM

## 2020-11-13 DIAGNOSIS — E11.65 UNCONTROLLED TYPE 2 DIABETES MELLITUS WITH HYPERGLYCEMIA: ICD-10-CM

## 2020-11-13 RX ORDER — INSULIN GLARGINE 100 [IU]/ML
25 INJECTION, SOLUTION SUBCUTANEOUS 2 TIMES DAILY
Qty: 45 ML | Refills: 1 | Status: SHIPPED | OUTPATIENT
Start: 2020-11-13 | End: 2021-02-03 | Stop reason: SDUPTHER

## 2020-11-13 RX ORDER — PAROXETINE HYDROCHLORIDE 20 MG/1
20 TABLET, FILM COATED ORAL EVERY MORNING
Qty: 90 TABLET | Refills: 1 | Status: SHIPPED | OUTPATIENT
Start: 2020-11-13 | End: 2021-02-03

## 2020-11-13 NOTE — TELEPHONE ENCOUNTER
Requested Prescriptions     Pending Prescriptions Disp Refills    paroxetine (PAXIL) 20 MG tablet 90 tablet 1     Sig: Take 1 tablet (20 mg total) by mouth every morning.    insulin glargine (LANTUS U-100 INSULIN) 100 unit/mL injection 45 mL 1     Sig: Inject 25 Units into the skin 2 (two) times a day. At bedtime   [facility] requesting med refills. LOV: 10/6/20

## 2020-11-30 RX ORDER — ROSUVASTATIN CALCIUM 40 MG/1
40 TABLET, COATED ORAL NIGHTLY
Qty: 90 TABLET | Refills: 3 | Status: SHIPPED | OUTPATIENT
Start: 2020-11-30 | End: 2024-02-15 | Stop reason: ALTCHOICE

## 2020-11-30 NOTE — TELEPHONE ENCOUNTER
"----- Message from Kristal Preciado sent at 2020  9:42 AM CST -----  Regarding: Rx Refill  Contact: Sincere with [facility]  Miles Currie  MRN: [id number]  : 1965  PCP: Adela Gifford  Home Phone      [phone]  Work Phone      Not on file.  Mobile          [phone]  Home Phone      [phone]      MESSAGE:   Rx Refill - rosuvastatin (CRESTOR) - "dose unknown / Rx not filled for the patient in the past"    Phone # [phone]  Fax # [phone]    Pharmacy - [facility]        "

## 2020-11-30 NOTE — TELEPHONE ENCOUNTER
Please send to [facility].    Requested Prescriptions     Pending Prescriptions Disp Refills    rosuvastatin (CRESTOR) 40 MG Tab 90 tablet 3     Sig: Take 1 tablet (40 mg total) by mouth every evening.

## 2020-12-04 ENCOUNTER — TELEPHONE (OUTPATIENT)
Dept: INTERNAL MEDICINE | Facility: CLINIC | Age: 55
End: 2020-12-04

## 2020-12-04 NOTE — TELEPHONE ENCOUNTER
[facility] pharmacy notified of directions. Verbalized understanding.

## 2020-12-04 NOTE — TELEPHONE ENCOUNTER
----- Message from Blanca Espinosa sent at 2020 11:45 AM CST -----  Contact: Elen Currie  MRN: [id number]  : 1965  PCP: Adela Gifford  Home Phone      [phone]  Work Phone      Not on file.  Mobile          [phone]  Home Phone      [phone]    MESSAGE LEFT ON VOICEMAIL    MESSAGE:     Calling to clarify directions/frequency for RX insulin glargine (LANTUS U-100 INSULIN) 100 unit/mL injection. Please call or fax at number below.      Phone: [phone]  Fax: [phone]

## 2020-12-04 NOTE — TELEPHONE ENCOUNTER
Pharmacy calling for clarification for insulin glargine (Lantus). RX state to inject 25 Units into the skin 2 times a day, but then it also says at bedtime. Please advise.

## 2020-12-09 DIAGNOSIS — N52.9 ERECTILE DYSFUNCTION, UNSPECIFIED ERECTILE DYSFUNCTION TYPE: ICD-10-CM

## 2020-12-09 RX ORDER — TADALAFIL 10 MG/1
20 TABLET ORAL DAILY PRN
Qty: 180 TABLET | Refills: 0 | Status: SHIPPED | OUTPATIENT
Start: 2020-12-09 | End: 2021-02-03 | Stop reason: SDUPTHER

## 2020-12-09 NOTE — TELEPHONE ENCOUNTER
----- Message from Kristal Preciado sent at 2020  9:00 AM CST -----  Regarding: Rx Refill  Contact: Min with [facility] Voicemail  Miles Currie  MRN: [id number]  : 1965  PCP: Adela Gifford  Home Phone      [phone]  Work Phone      Not on file.  Mobile          [phone]  Home Phone      [phone]      MESSAGE:   Rx Refill - tadalafiL (CIALIS) 10 MG tablet.  90 day Rx supply requested     Phone # [phone]  Fax # [phone]    Pharmacy - [facility] [address]

## 2020-12-19 ENCOUNTER — HOSPITAL ENCOUNTER (EMERGENCY)
Facility: HOSPITAL | Age: 55
Discharge: HOME | End: 2020-12-19
Attending: SURGERY
Payer: MEDICARE

## 2020-12-19 VITALS
HEART RATE: 78 BPM | OXYGEN SATURATION: 98 % | WEIGHT: 187.75 LBS | DIASTOLIC BLOOD PRESSURE: 60 MMHG | BODY MASS INDEX: 30.3 KG/M2 | TEMPERATURE: 98 F | SYSTOLIC BLOOD PRESSURE: 110 MMHG | RESPIRATION RATE: 18 BRPM

## 2020-12-19 DIAGNOSIS — B02.9 HERPES ZOSTER WITHOUT COMPLICATION: Primary | ICD-10-CM

## 2020-12-19 PROCEDURE — 99284 EMERGENCY DEPT VISIT MOD MDM: CPT | Mod: 25,HCNC

## 2020-12-19 PROCEDURE — 63600175 PHARM REV CODE 636 W HCPCS: Mod: HCNC | Performed by: SURGERY

## 2020-12-19 PROCEDURE — 96372 THER/PROPH/DIAG INJ SC/IM: CPT | Mod: HCNC

## 2020-12-19 PROCEDURE — 25000003 PHARM REV CODE 250: Mod: HCNC | Performed by: SURGERY

## 2020-12-19 RX ORDER — ERYTHROMYCIN 5 MG/G
OINTMENT OPHTHALMIC EVERY 8 HOURS
Qty: 1 TUBE | Refills: 0 | Status: SHIPPED | OUTPATIENT
Start: 2020-12-19 | End: 2020-12-26

## 2020-12-19 RX ORDER — VALACYCLOVIR HYDROCHLORIDE 1 G/1
1000 TABLET, FILM COATED ORAL 3 TIMES DAILY
Qty: 21 TABLET | Refills: 0 | Status: ON HOLD | OUTPATIENT
Start: 2020-12-19 | End: 2023-05-27 | Stop reason: HOSPADM

## 2020-12-19 RX ORDER — HYDROCODONE BITARTRATE AND ACETAMINOPHEN 10; 325 MG/1; MG/1
1 TABLET ORAL EVERY 6 HOURS PRN
Qty: 20 TABLET | Refills: 0 | Status: SHIPPED | OUTPATIENT
Start: 2020-12-19 | End: 2020-12-24

## 2020-12-19 RX ORDER — TETRACAINE HYDROCHLORIDE 5 MG/ML
2 SOLUTION OPHTHALMIC
Status: COMPLETED | OUTPATIENT
Start: 2020-12-19 | End: 2020-12-19

## 2020-12-19 RX ORDER — ERYTHROMYCIN 5 MG/G
0.5 OINTMENT OPHTHALMIC
Status: COMPLETED | OUTPATIENT
Start: 2020-12-19 | End: 2020-12-19

## 2020-12-19 RX ORDER — ONDANSETRON 2 MG/ML
4 INJECTION INTRAMUSCULAR; INTRAVENOUS
Status: DISCONTINUED | OUTPATIENT
Start: 2020-12-19 | End: 2020-12-19 | Stop reason: ALTCHOICE

## 2020-12-19 RX ORDER — KETOROLAC TROMETHAMINE 30 MG/ML
60 INJECTION, SOLUTION INTRAMUSCULAR; INTRAVENOUS
Status: COMPLETED | OUTPATIENT
Start: 2020-12-19 | End: 2020-12-19

## 2020-12-19 RX ORDER — MORPHINE SULFATE 2 MG/ML
2 INJECTION, SOLUTION INTRAMUSCULAR; INTRAVENOUS
Status: DISCONTINUED | OUTPATIENT
Start: 2020-12-19 | End: 2020-12-19 | Stop reason: ALTCHOICE

## 2020-12-19 RX ADMIN — TETRACAINE HYDROCHLORIDE 2 DROP: 5 SOLUTION OPHTHALMIC at 07:12

## 2020-12-19 RX ADMIN — FLUORESCEIN SODIUM 1 EACH: 1 STRIP OPHTHALMIC at 07:12

## 2020-12-19 RX ADMIN — KETOROLAC TROMETHAMINE 60 MG: 30 INJECTION, SOLUTION INTRAMUSCULAR at 08:12

## 2020-12-19 RX ADMIN — ERYTHROMYCIN 0.5 INCH: 5 OINTMENT OPHTHALMIC at 07:12

## 2020-12-19 NOTE — ED TRIAGE NOTES
Patient presents to the ER with shingles to left eye and left side of head.  Patient reports symptoms started 2 days ago.

## 2020-12-19 NOTE — ED PROVIDER NOTES
[facility]                                                 I reviewed the ER triage nurse's note before evaluating the patient    Chief Complaint  55 y.o. 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 No pertinent family history.    I have reviewed all of this patient's past medical, surgical, family, and social   histories as well as active allergies and medications documented in the  electronic medical record    Review of Systems and Physical Exam      Review of Systems  -- Constitution - no fever, denies fatigue, no weakness, no chills  -- Eyes - no tearing or redness, no visual disturbance  -- Ear, Nose - no tinnitus or earache, no nasal congestion or discharge  -- Mouth,Throat - no sore throat, no toothache, normal voice, normal swallowing  -- Respiratory - denies cough and congestion, no shortness of breath, no PIERRE  -- Cardiovascular - denies chest pain, no palpitations, denies claudication  -- Gastrointestinal - denies abdominal pain, nausea, vomiting, or diarrhea  -- Genitourinary - no dysuria, denies flank pain, no hematuria, no STD risk  -- Musculoskeletal - denies back pain, negative for trauma or injury  -- Neurological - no headache, denies weakness or seizure; no LOC  -- Skin - left-sided shingles rash to the face involving eye and  ear    Vital Signs  His tympanic temperature is 97 °F (36.1 °C).   His blood pressure is 109/55 and his pulse is 77.   His respiration is 18 and oxygen saturation is 98%.     Physical Exam  -- Nursing note and vitals reviewed  -- Constitutional: Appears well-developed and well-nourished  -- Head: Atraumatic. Normocephalic. No obvious abnormality  -- Eyes:  Reactive conjunctivitis left high, no herpetic lesion  -- Nose: Nose normal in appearance, nares grossly normal. No discharge  -- Throat: Mucous membranes moist, pharynx normal, normal tonsils. No lesions   -- Ears: External ears and TM normal bilaterally. Normal hearing and no drainage  -- Neck: Normal range of motion. Neck supple. No masses, trachea midline  -- Cardiac: Normal rate, regular rhythm and normal heart sounds  -- Pulmonary: Normal respiratory effort, breath sounds clear to auscultation  -- Abdominal: Soft, no tenderness. Normal bowel sounds. Normal liver edge  -- Musculoskeletal: Normal range of motion, no effusions. Joints stable   -- Neurological: No focal deficits. Showed good interaction with staff  -- Vascular: Posterior tibial, dorsalis pedis and radial pulses 2+ bilaterally    -- Lymphatics: No cervical or peripheral lymphadenopathy. No edema noted  -- Skin:  Left facial rash to the ER and periorbital area    Emergency Room Course      Treatment and Evaluation  -- Tetracaine applied to the left eye for local anesthesia  -- Left eye examined with fluorescein eye strip- no abrasion or foreign body    Medications Given  erythromycin 5 mg/gram (0.5 %) ophthalmic ointment 0.5 inch    tetracaine HCl (PF) 0.5 % Drop 2 drop (2 drops Right Eye Given 12/19/20 0755)   fluorescein ophthalmic strip 1 each (1 each Right Eye Given 12/19/20 0755)   ketorolac injection 60 mg (60 mg Intramuscular Given 12/19/20 0805)     ED Physician Management  -- Diagnosis management comments: 55 y.o. 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  -- I advised the patient that we have found no life threatening condition today  -- At this time, I believe the patient is clinically stable for discharge.   -- The patient acknowledges that close follow up with a MD is required   -- Patient agrees to comply with all instruction and direction given in the ER    This note is dictated on M*Modal word recognition program.  There are word recognition mistakes that are occasionally missed on review.         Bernard Jimenez MD  12/19/20 0847

## 2020-12-31 DIAGNOSIS — E11.65 UNCONTROLLED TYPE 2 DIABETES MELLITUS WITH HYPERGLYCEMIA: ICD-10-CM

## 2021-01-02 RX ORDER — SYRINGE,SAFETY WITH NEEDLE,1ML 25GX1"
SYRINGE (EA) MISCELLANEOUS
Qty: 100 EACH | Refills: 0 | Status: SHIPPED | OUTPATIENT
Start: 2021-01-02 | End: 2021-07-09 | Stop reason: SDUPTHER

## 2021-01-04 ENCOUNTER — PATIENT MESSAGE (OUTPATIENT)
Dept: ADMINISTRATIVE | Facility: HOSPITAL | Age: 56
End: 2021-01-04

## 2021-02-03 ENCOUNTER — OFFICE VISIT (OUTPATIENT)
Dept: INTERNAL MEDICINE | Facility: CLINIC | Age: 56
End: 2021-02-03
Payer: MEDICARE

## 2021-02-03 VITALS
RESPIRATION RATE: 18 BRPM | DIASTOLIC BLOOD PRESSURE: 65 MMHG | HEART RATE: 71 BPM | BODY MASS INDEX: 29.37 KG/M2 | HEIGHT: 66 IN | WEIGHT: 182.75 LBS | OXYGEN SATURATION: 96 % | TEMPERATURE: 96 F | SYSTOLIC BLOOD PRESSURE: 110 MMHG

## 2021-02-03 DIAGNOSIS — Z95.828 H/O AORTA-ILIAC-FEMORAL BYPASS: ICD-10-CM

## 2021-02-03 DIAGNOSIS — F31.9 BIPOLAR AFFECTIVE DISORDER, REMISSION STATUS UNSPECIFIED: ICD-10-CM

## 2021-02-03 DIAGNOSIS — I50.22 CHRONIC SYSTOLIC CONGESTIVE HEART FAILURE: ICD-10-CM

## 2021-02-03 DIAGNOSIS — Z72.0 TOBACCO ABUSE: ICD-10-CM

## 2021-02-03 DIAGNOSIS — E11.65 UNCONTROLLED TYPE 2 DIABETES MELLITUS WITH HYPERGLYCEMIA: Primary | ICD-10-CM

## 2021-02-03 DIAGNOSIS — I25.119 CORONARY ARTERY DISEASE INVOLVING NATIVE CORONARY ARTERY OF NATIVE HEART WITH ANGINA PECTORIS: ICD-10-CM

## 2021-02-03 DIAGNOSIS — N52.9 ERECTILE DYSFUNCTION, UNSPECIFIED ERECTILE DYSFUNCTION TYPE: ICD-10-CM

## 2021-02-03 DIAGNOSIS — E78.5 HYPERLIPIDEMIA, UNSPECIFIED HYPERLIPIDEMIA TYPE: ICD-10-CM

## 2021-02-03 DIAGNOSIS — I73.9 PERIPHERAL VASCULAR DISEASE, UNSPECIFIED: ICD-10-CM

## 2021-02-03 DIAGNOSIS — E11.42 DIABETIC POLYNEUROPATHY ASSOCIATED WITH TYPE 2 DIABETES MELLITUS: ICD-10-CM

## 2021-02-03 PROCEDURE — 3008F BODY MASS INDEX DOCD: CPT | Mod: CPTII,S$GLB,, | Performed by: INTERNAL MEDICINE

## 2021-02-03 PROCEDURE — 99215 OFFICE O/P EST HI 40 MIN: CPT | Mod: S$GLB,,, | Performed by: INTERNAL MEDICINE

## 2021-02-03 PROCEDURE — 99499 RISK ADDL DX/OHS AUDIT: ICD-10-PCS | Mod: S$GLB,,, | Performed by: INTERNAL MEDICINE

## 2021-02-03 PROCEDURE — 1125F AMNT PAIN NOTED PAIN PRSNT: CPT | Mod: S$GLB,,, | Performed by: INTERNAL MEDICINE

## 2021-02-03 PROCEDURE — 99215 PR OFFICE/OUTPT VISIT, EST, LEVL V, 40-54 MIN: ICD-10-PCS | Mod: S$GLB,,, | Performed by: INTERNAL MEDICINE

## 2021-02-03 PROCEDURE — 99999 PR PBB SHADOW E&M-EST. PATIENT-LVL V: CPT | Mod: PBBFAC,,, | Performed by: INTERNAL MEDICINE

## 2021-02-03 PROCEDURE — 99999 PR PBB SHADOW E&M-EST. PATIENT-LVL V: ICD-10-PCS | Mod: PBBFAC,,, | Performed by: INTERNAL MEDICINE

## 2021-02-03 PROCEDURE — 3008F PR BODY MASS INDEX (BMI) DOCUMENTED: ICD-10-PCS | Mod: CPTII,S$GLB,, | Performed by: INTERNAL MEDICINE

## 2021-02-03 PROCEDURE — 1125F PR PAIN SEVERITY QUANTIFIED, PAIN PRESENT: ICD-10-PCS | Mod: S$GLB,,, | Performed by: INTERNAL MEDICINE

## 2021-02-03 PROCEDURE — 3051F HG A1C>EQUAL 7.0%<8.0%: CPT | Mod: CPTII,S$GLB,, | Performed by: INTERNAL MEDICINE

## 2021-02-03 PROCEDURE — 99499 UNLISTED E&M SERVICE: CPT | Mod: S$GLB,,, | Performed by: INTERNAL MEDICINE

## 2021-02-03 PROCEDURE — 3051F PR MOST RECENT HEMOGLOBIN A1C LEVEL 7.0 - < 8.0%: ICD-10-PCS | Mod: CPTII,S$GLB,, | Performed by: INTERNAL MEDICINE

## 2021-02-03 RX ORDER — INSULIN GLARGINE 100 [IU]/ML
30 INJECTION, SOLUTION SUBCUTANEOUS 2 TIMES DAILY
Qty: 54 ML | Refills: 1
Start: 2021-02-03 | End: 2021-03-29 | Stop reason: SDUPTHER

## 2021-02-03 RX ORDER — TADALAFIL 10 MG/1
10 TABLET ORAL DAILY PRN
Qty: 20 TABLET | Refills: 0 | Status: SHIPPED | OUTPATIENT
Start: 2021-02-03 | End: 2021-03-24

## 2021-02-03 RX ORDER — DULOXETIN HYDROCHLORIDE 30 MG/1
30 CAPSULE, DELAYED RELEASE ORAL DAILY
Qty: 30 CAPSULE | Refills: 11 | Status: SHIPPED | OUTPATIENT
Start: 2021-02-03 | End: 2021-03-24

## 2021-03-24 ENCOUNTER — PATIENT OUTREACH (OUTPATIENT)
Dept: ADMINISTRATIVE | Facility: HOSPITAL | Age: 56
End: 2021-03-24

## 2021-03-24 ENCOUNTER — OFFICE VISIT (OUTPATIENT)
Dept: INTERNAL MEDICINE | Facility: CLINIC | Age: 56
End: 2021-03-24
Payer: MEDICARE

## 2021-03-24 VITALS
DIASTOLIC BLOOD PRESSURE: 64 MMHG | WEIGHT: 186.06 LBS | SYSTOLIC BLOOD PRESSURE: 110 MMHG | BODY MASS INDEX: 29.9 KG/M2 | HEART RATE: 84 BPM | TEMPERATURE: 97 F | HEIGHT: 66 IN | RESPIRATION RATE: 20 BRPM

## 2021-03-24 DIAGNOSIS — Z71.85 VACCINE COUNSELING: ICD-10-CM

## 2021-03-24 DIAGNOSIS — F31.9 BIPOLAR AFFECTIVE DISORDER, REMISSION STATUS UNSPECIFIED: Primary | ICD-10-CM

## 2021-03-24 DIAGNOSIS — Z76.0 MEDICATION REFILL: ICD-10-CM

## 2021-03-24 DIAGNOSIS — G47.33 OSA (OBSTRUCTIVE SLEEP APNEA): ICD-10-CM

## 2021-03-24 PROCEDURE — 99999 PR PBB SHADOW E&M-EST. PATIENT-LVL IV: CPT | Mod: PBBFAC,,, | Performed by: INTERNAL MEDICINE

## 2021-03-24 PROCEDURE — 3008F PR BODY MASS INDEX (BMI) DOCUMENTED: ICD-10-PCS | Mod: CPTII,S$GLB,, | Performed by: INTERNAL MEDICINE

## 2021-03-24 PROCEDURE — 3008F BODY MASS INDEX DOCD: CPT | Mod: CPTII,S$GLB,, | Performed by: INTERNAL MEDICINE

## 2021-03-24 PROCEDURE — 99214 OFFICE O/P EST MOD 30 MIN: CPT | Mod: S$GLB,,, | Performed by: INTERNAL MEDICINE

## 2021-03-24 PROCEDURE — 1125F PR PAIN SEVERITY QUANTIFIED, PAIN PRESENT: ICD-10-PCS | Mod: S$GLB,,, | Performed by: INTERNAL MEDICINE

## 2021-03-24 PROCEDURE — 99214 PR OFFICE/OUTPT VISIT, EST, LEVL IV, 30-39 MIN: ICD-10-PCS | Mod: S$GLB,,, | Performed by: INTERNAL MEDICINE

## 2021-03-24 PROCEDURE — 99999 PR PBB SHADOW E&M-EST. PATIENT-LVL IV: ICD-10-PCS | Mod: PBBFAC,,, | Performed by: INTERNAL MEDICINE

## 2021-03-24 PROCEDURE — 1125F AMNT PAIN NOTED PAIN PRSNT: CPT | Mod: S$GLB,,, | Performed by: INTERNAL MEDICINE

## 2021-03-24 RX ORDER — PAROXETINE HYDROCHLORIDE 20 MG/1
20 TABLET, FILM COATED ORAL EVERY MORNING
Qty: 30 TABLET | Refills: 11 | Status: SHIPPED | OUTPATIENT
Start: 2021-03-24 | End: 2021-06-18

## 2021-03-24 RX ORDER — TADALAFIL 20 MG/1
20 TABLET ORAL DAILY
Qty: 20 TABLET | Refills: 11 | Status: SHIPPED | OUTPATIENT
Start: 2021-03-24 | End: 2022-04-08

## 2021-03-29 DIAGNOSIS — E11.65 UNCONTROLLED TYPE 2 DIABETES MELLITUS WITH HYPERGLYCEMIA: ICD-10-CM

## 2021-03-29 RX ORDER — INSULIN GLARGINE 100 [IU]/ML
30 INJECTION, SOLUTION SUBCUTANEOUS 2 TIMES DAILY
Qty: 54 ML | Refills: 0 | Status: SHIPPED | OUTPATIENT
Start: 2021-03-29 | End: 2021-04-05 | Stop reason: SDUPTHER

## 2021-04-05 ENCOUNTER — PATIENT MESSAGE (OUTPATIENT)
Dept: ADMINISTRATIVE | Facility: HOSPITAL | Age: 56
End: 2021-04-05

## 2021-04-05 DIAGNOSIS — E11.65 UNCONTROLLED TYPE 2 DIABETES MELLITUS WITH HYPERGLYCEMIA: ICD-10-CM

## 2021-04-05 RX ORDER — INSULIN GLARGINE 100 [IU]/ML
30 INJECTION, SOLUTION SUBCUTANEOUS 2 TIMES DAILY
Qty: 54 ML | Refills: 0 | Status: SHIPPED | OUTPATIENT
Start: 2021-04-05 | End: 2021-07-13

## 2021-05-24 ENCOUNTER — TELEPHONE (OUTPATIENT)
Dept: INTERNAL MEDICINE | Facility: CLINIC | Age: 56
End: 2021-05-24

## 2021-05-28 ENCOUNTER — OFFICE VISIT (OUTPATIENT)
Dept: INTERNAL MEDICINE | Facility: CLINIC | Age: 56
End: 2021-05-28
Payer: MEDICARE

## 2021-05-28 VITALS
HEART RATE: 78 BPM | DIASTOLIC BLOOD PRESSURE: 60 MMHG | SYSTOLIC BLOOD PRESSURE: 102 MMHG | WEIGHT: 189.13 LBS | BODY MASS INDEX: 30.4 KG/M2 | OXYGEN SATURATION: 95 % | RESPIRATION RATE: 18 BRPM | HEIGHT: 66 IN

## 2021-05-28 DIAGNOSIS — J13 PNEUMONIA OF BOTH LUNGS DUE TO STREPTOCOCCUS PNEUMONIAE, UNSPECIFIED PART OF LUNG: ICD-10-CM

## 2021-05-28 DIAGNOSIS — Z09 HOSPITAL DISCHARGE FOLLOW-UP: Primary | ICD-10-CM

## 2021-05-28 PROCEDURE — 99213 OFFICE O/P EST LOW 20 MIN: CPT | Mod: S$GLB,,, | Performed by: INTERNAL MEDICINE

## 2021-05-28 PROCEDURE — 99999 PR PBB SHADOW E&M-EST. PATIENT-LVL IV: ICD-10-PCS | Mod: PBBFAC,,, | Performed by: INTERNAL MEDICINE

## 2021-05-28 PROCEDURE — 3008F PR BODY MASS INDEX (BMI) DOCUMENTED: ICD-10-PCS | Mod: CPTII,S$GLB,, | Performed by: INTERNAL MEDICINE

## 2021-05-28 PROCEDURE — 99499 UNLISTED E&M SERVICE: CPT | Mod: S$GLB,,, | Performed by: INTERNAL MEDICINE

## 2021-05-28 PROCEDURE — 1126F PR PAIN SEVERITY QUANTIFIED, NO PAIN PRESENT: ICD-10-PCS | Mod: S$GLB,,, | Performed by: INTERNAL MEDICINE

## 2021-05-28 PROCEDURE — 3008F BODY MASS INDEX DOCD: CPT | Mod: CPTII,S$GLB,, | Performed by: INTERNAL MEDICINE

## 2021-05-28 PROCEDURE — 99999 PR PBB SHADOW E&M-EST. PATIENT-LVL IV: CPT | Mod: PBBFAC,,, | Performed by: INTERNAL MEDICINE

## 2021-05-28 PROCEDURE — 99499 RISK ADDL DX/OHS AUDIT: ICD-10-PCS | Mod: S$GLB,,, | Performed by: INTERNAL MEDICINE

## 2021-05-28 PROCEDURE — 99213 PR OFFICE/OUTPT VISIT, EST, LEVL III, 20-29 MIN: ICD-10-PCS | Mod: S$GLB,,, | Performed by: INTERNAL MEDICINE

## 2021-05-28 PROCEDURE — 1126F AMNT PAIN NOTED NONE PRSNT: CPT | Mod: S$GLB,,, | Performed by: INTERNAL MEDICINE

## 2021-06-03 ENCOUNTER — PATIENT OUTREACH (OUTPATIENT)
Dept: ADMINISTRATIVE | Facility: HOSPITAL | Age: 56
End: 2021-06-03

## 2021-07-07 ENCOUNTER — PATIENT MESSAGE (OUTPATIENT)
Dept: ADMINISTRATIVE | Facility: HOSPITAL | Age: 56
End: 2021-07-07

## 2021-07-09 ENCOUNTER — TELEPHONE (OUTPATIENT)
Dept: INTERNAL MEDICINE | Facility: CLINIC | Age: 56
End: 2021-07-09

## 2021-07-09 DIAGNOSIS — E11.65 UNCONTROLLED TYPE 2 DIABETES MELLITUS WITH HYPERGLYCEMIA: ICD-10-CM

## 2021-07-09 RX ORDER — SYRINGE,SAFETY WITH NEEDLE,1ML 25GX1"
SYRINGE (EA) MISCELLANEOUS
Qty: 10 EACH | Refills: 0 | Status: SHIPPED | OUTPATIENT
Start: 2021-07-09

## 2021-07-12 ENCOUNTER — TELEPHONE (OUTPATIENT)
Dept: INTERNAL MEDICINE | Facility: CLINIC | Age: 56
End: 2021-07-12

## 2021-07-13 ENCOUNTER — TELEPHONE (OUTPATIENT)
Dept: INTERNAL MEDICINE | Facility: CLINIC | Age: 56
End: 2021-07-13

## 2021-07-13 RX ORDER — PEN NEEDLE, DIABETIC 30 GX3/16"
NEEDLE, DISPOSABLE MISCELLANEOUS
Qty: 100 EACH | Refills: 1 | Status: SHIPPED | OUTPATIENT
Start: 2021-07-13 | End: 2022-11-08

## 2021-07-13 RX ORDER — INSULIN GLARGINE 100 [IU]/ML
30 INJECTION, SOLUTION SUBCUTANEOUS NIGHTLY
Qty: 27 ML | Refills: 3 | Status: SHIPPED | OUTPATIENT
Start: 2021-07-13 | End: 2021-08-10 | Stop reason: SDUPTHER

## 2021-08-04 ENCOUNTER — PATIENT MESSAGE (OUTPATIENT)
Dept: ADMINISTRATIVE | Facility: HOSPITAL | Age: 56
End: 2021-08-04

## 2021-08-10 ENCOUNTER — TELEPHONE (OUTPATIENT)
Dept: INTERNAL MEDICINE | Facility: CLINIC | Age: 56
End: 2021-08-10

## 2021-08-10 RX ORDER — INSULIN GLARGINE 100 [IU]/ML
30 INJECTION, SOLUTION SUBCUTANEOUS 2 TIMES DAILY
Qty: 54 ML | Refills: 3 | Status: SHIPPED | OUTPATIENT
Start: 2021-08-10 | End: 2021-09-01 | Stop reason: SDUPTHER

## 2021-09-01 ENCOUNTER — HOSPITAL ENCOUNTER (EMERGENCY)
Facility: HOSPITAL | Age: 56
Discharge: HOME | End: 2021-09-01
Attending: SURGERY
Payer: COMMERCIAL

## 2021-09-01 VITALS
HEART RATE: 84 BPM | OXYGEN SATURATION: 96 % | DIASTOLIC BLOOD PRESSURE: 67 MMHG | BODY MASS INDEX: 29.27 KG/M2 | SYSTOLIC BLOOD PRESSURE: 101 MMHG | WEIGHT: 186.5 LBS | HEIGHT: 67 IN | RESPIRATION RATE: 18 BRPM | TEMPERATURE: 98 F

## 2021-09-01 DIAGNOSIS — Z76.0 MEDICATION REFILL: Primary | ICD-10-CM

## 2021-09-01 PROCEDURE — 96372 THER/PROPH/DIAG INJ SC/IM: CPT

## 2021-09-01 PROCEDURE — 99284 EMERGENCY DEPT VISIT MOD MDM: CPT | Mod: 25

## 2021-09-01 PROCEDURE — 63600175 PHARM REV CODE 636 W HCPCS: Performed by: SURGERY

## 2021-09-01 RX ORDER — INSULIN GLARGINE 100 [IU]/ML
30 INJECTION, SOLUTION SUBCUTANEOUS 2 TIMES DAILY
Qty: 54 ML | Refills: 3 | Status: SHIPPED | OUTPATIENT
Start: 2021-09-01 | End: 2021-09-24

## 2021-09-01 RX ORDER — INSULIN ASPART 100 [IU]/ML
8 INJECTION, SOLUTION INTRAVENOUS; SUBCUTANEOUS
Status: COMPLETED | OUTPATIENT
Start: 2021-09-01 | End: 2021-09-01

## 2021-09-01 RX ADMIN — INSULIN ASPART 8 UNITS: 100 INJECTION, SOLUTION INTRAVENOUS; SUBCUTANEOUS at 09:09

## 2021-09-24 ENCOUNTER — TELEPHONE (OUTPATIENT)
Dept: INTERNAL MEDICINE | Facility: CLINIC | Age: 56
End: 2021-09-24

## 2021-09-24 RX ORDER — INSULIN GLARGINE 100 [IU]/ML
30 INJECTION, SOLUTION SUBCUTANEOUS 2 TIMES DAILY
Qty: 1 BOX | Refills: 1 | Status: SHIPPED | OUTPATIENT
Start: 2021-09-24 | End: 2021-10-06 | Stop reason: SDUPTHER

## 2021-09-28 ENCOUNTER — HOSPITAL ENCOUNTER (EMERGENCY)
Facility: HOSPITAL | Age: 56
Discharge: HOME | End: 2021-09-28
Attending: SURGERY
Payer: COMMERCIAL

## 2021-09-28 VITALS
RESPIRATION RATE: 18 BRPM | OXYGEN SATURATION: 99 % | SYSTOLIC BLOOD PRESSURE: 110 MMHG | DIASTOLIC BLOOD PRESSURE: 63 MMHG | HEART RATE: 78 BPM | HEIGHT: 66 IN | TEMPERATURE: 97 F | BODY MASS INDEX: 30.69 KG/M2 | WEIGHT: 190.94 LBS

## 2021-09-28 DIAGNOSIS — R73.9 HYPERGLYCEMIA: Primary | ICD-10-CM

## 2021-09-28 LAB
ALBUMIN SERPL BCP-MCNC: 3.5 G/DL (ref 3.5–5.2)
ALP SERPL-CCNC: 137 U/L (ref 55–135)
ALT SERPL W/O P-5'-P-CCNC: 26 U/L (ref 10–44)
AMPHET+METHAMPHET UR QL: NEGATIVE
ANION GAP SERPL CALC-SCNC: 11 MMOL/L (ref 8–16)
AST SERPL-CCNC: 17 U/L (ref 10–40)
B-OH-BUTYR BLD STRIP-SCNC: 0.1 MMOL/L (ref 0–0.5)
BACTERIA #/AREA URNS HPF: NORMAL /HPF
BARBITURATES UR QL SCN>200 NG/ML: NEGATIVE
BASOPHILS # BLD AUTO: 0.04 K/UL (ref 0–0.2)
BASOPHILS NFR BLD: 0.5 % (ref 0–1.9)
BENZODIAZ UR QL SCN>200 NG/ML: NEGATIVE
BILIRUB SERPL-MCNC: 0.7 MG/DL (ref 0.1–1)
BILIRUB UR QL STRIP: NEGATIVE
BUN SERPL-MCNC: 8 MG/DL (ref 6–20)
BZE UR QL SCN: NEGATIVE
CALCIUM SERPL-MCNC: 8.8 MG/DL (ref 8.7–10.5)
CANNABINOIDS UR QL SCN: NEGATIVE
CHLORIDE SERPL-SCNC: 96 MMOL/L (ref 95–110)
CLARITY UR: CLEAR
CO2 SERPL-SCNC: 22 MMOL/L (ref 23–29)
COLOR UR: COLORLESS
CREAT SERPL-MCNC: 1.3 MG/DL (ref 0.5–1.4)
CREAT UR-MCNC: 7.2 MG/DL (ref 23–375)
DIFFERENTIAL METHOD: ABNORMAL
EOSINOPHIL # BLD AUTO: 0.2 K/UL (ref 0–0.5)
EOSINOPHIL NFR BLD: 2.7 % (ref 0–8)
ERYTHROCYTE [DISTWIDTH] IN BLOOD BY AUTOMATED COUNT: 13.4 % (ref 11.5–14.5)
EST. GFR  (AFRICAN AMERICAN): >60 ML/MIN/1.73 M^2
EST. GFR  (NON AFRICAN AMERICAN): >60 ML/MIN/1.73 M^2
ESTIMATED AVG GLUCOSE: 309 MG/DL (ref 68–131)
GLUCOSE SERPL-MCNC: 643 MG/DL (ref 70–110)
GLUCOSE UR QL STRIP: ABNORMAL
HBA1C MFR BLD: 12.4 % (ref 4–5.6)
HCT VFR BLD AUTO: 45.4 % (ref 40–54)
HGB BLD-MCNC: 15 G/DL (ref 14–18)
HGB UR QL STRIP: NEGATIVE
IMM GRANULOCYTES # BLD AUTO: 0.04 K/UL (ref 0–0.04)
IMM GRANULOCYTES NFR BLD AUTO: 0.5 % (ref 0–0.5)
KETONES UR QL STRIP: NEGATIVE
LEUKOCYTE ESTERASE UR QL STRIP: NEGATIVE
LYMPHOCYTES # BLD AUTO: 1.3 K/UL (ref 1–4.8)
LYMPHOCYTES NFR BLD: 16.7 % (ref 18–48)
MCH RBC QN AUTO: 31.2 PG (ref 27–31)
MCHC RBC AUTO-ENTMCNC: 33 G/DL (ref 32–36)
MCV RBC AUTO: 94 FL (ref 82–98)
METHADONE UR QL SCN>300 NG/ML: NEGATIVE
MICROSCOPIC COMMENT: NORMAL
MONOCYTES # BLD AUTO: 0.5 K/UL (ref 0.3–1)
MONOCYTES NFR BLD: 6.3 % (ref 4–15)
NEUTROPHILS # BLD AUTO: 5.8 K/UL (ref 1.8–7.7)
NEUTROPHILS NFR BLD: 73.3 % (ref 38–73)
NITRITE UR QL STRIP: NEGATIVE
NRBC BLD-RTO: 0 /100 WBC
OPIATES UR QL SCN: NEGATIVE
PCP UR QL SCN>25 NG/ML: NEGATIVE
PH UR STRIP: 5 [PH] (ref 5–8)
PLATELET # BLD AUTO: 142 K/UL (ref 150–450)
PMV BLD AUTO: 9.8 FL (ref 9.2–12.9)
POCT GLUCOSE: 172 MG/DL (ref 70–110)
POCT GLUCOSE: 389 MG/DL (ref 70–110)
POCT GLUCOSE: >500 MG/DL (ref 70–110)
POTASSIUM SERPL-SCNC: 4.1 MMOL/L (ref 3.5–5.1)
PROT SERPL-MCNC: 7.1 G/DL (ref 6–8.4)
PROT UR QL STRIP: NEGATIVE
RBC # BLD AUTO: 4.81 M/UL (ref 4.6–6.2)
SODIUM SERPL-SCNC: 129 MMOL/L (ref 136–145)
SP GR UR STRIP: 1.01 (ref 1–1.03)
TOXICOLOGY INFORMATION: ABNORMAL
URN SPEC COLLECT METH UR: ABNORMAL
UROBILINOGEN UR STRIP-ACNC: NEGATIVE EU/DL
WBC # BLD AUTO: 7.9 K/UL (ref 3.9–12.7)
YEAST URNS QL MICRO: NORMAL

## 2021-09-28 PROCEDURE — 82010 KETONE BODYS QUAN: CPT | Performed by: SURGERY

## 2021-09-28 PROCEDURE — 36415 COLL VENOUS BLD VENIPUNCTURE: CPT | Performed by: SURGERY

## 2021-09-28 PROCEDURE — 25000003 PHARM REV CODE 250: Performed by: SURGERY

## 2021-09-28 PROCEDURE — 80053 COMPREHEN METABOLIC PANEL: CPT | Performed by: SURGERY

## 2021-09-28 PROCEDURE — 83036 HEMOGLOBIN GLYCOSYLATED A1C: CPT | Performed by: SURGERY

## 2021-09-28 PROCEDURE — 96374 THER/PROPH/DIAG INJ IV PUSH: CPT

## 2021-09-28 PROCEDURE — 63600175 PHARM REV CODE 636 W HCPCS: Performed by: SURGERY

## 2021-09-28 PROCEDURE — 80307 DRUG TEST PRSMV CHEM ANLYZR: CPT | Performed by: SURGERY

## 2021-09-28 PROCEDURE — 81000 URINALYSIS NONAUTO W/SCOPE: CPT | Mod: 59 | Performed by: SURGERY

## 2021-09-28 PROCEDURE — 85025 COMPLETE CBC W/AUTO DIFF WBC: CPT | Performed by: SURGERY

## 2021-09-28 PROCEDURE — 99284 EMERGENCY DEPT VISIT MOD MDM: CPT | Mod: 25

## 2021-09-28 PROCEDURE — 96361 HYDRATE IV INFUSION ADD-ON: CPT

## 2021-09-28 PROCEDURE — 82962 GLUCOSE BLOOD TEST: CPT

## 2021-09-28 RX ADMIN — SODIUM CHLORIDE 1000 ML: 0.9 INJECTION, SOLUTION INTRAVENOUS at 09:09

## 2021-09-28 RX ADMIN — INSULIN HUMAN 16 UNITS: 100 INJECTION, SOLUTION PARENTERAL at 10:09

## 2021-09-28 RX ADMIN — SODIUM CHLORIDE 1000 ML: 0.9 INJECTION, SOLUTION INTRAVENOUS at 10:09

## 2021-09-29 ENCOUNTER — OFFICE VISIT (OUTPATIENT)
Dept: INTERNAL MEDICINE | Facility: CLINIC | Age: 56
End: 2021-09-29
Payer: COMMERCIAL

## 2021-09-29 VITALS
HEART RATE: 79 BPM | OXYGEN SATURATION: 97 % | RESPIRATION RATE: 16 BRPM | BODY MASS INDEX: 30.86 KG/M2 | SYSTOLIC BLOOD PRESSURE: 104 MMHG | HEIGHT: 66 IN | DIASTOLIC BLOOD PRESSURE: 62 MMHG | WEIGHT: 192 LBS

## 2021-09-29 DIAGNOSIS — E11.65 UNCONTROLLED TYPE 2 DIABETES MELLITUS WITH HYPERGLYCEMIA: Primary | ICD-10-CM

## 2021-09-29 DIAGNOSIS — I25.119 CORONARY ARTERY DISEASE INVOLVING NATIVE CORONARY ARTERY OF NATIVE HEART WITH ANGINA PECTORIS: ICD-10-CM

## 2021-09-29 DIAGNOSIS — I50.22 CHRONIC SYSTOLIC CONGESTIVE HEART FAILURE: ICD-10-CM

## 2021-09-29 DIAGNOSIS — I73.9 PAD (PERIPHERAL ARTERY DISEASE): ICD-10-CM

## 2021-09-29 DIAGNOSIS — Z95.828 H/O AORTA-ILIAC-FEMORAL BYPASS: ICD-10-CM

## 2021-09-29 PROCEDURE — 99214 PR OFFICE/OUTPT VISIT, EST, LEVL IV, 30-39 MIN: ICD-10-PCS | Mod: S$GLB,,, | Performed by: INTERNAL MEDICINE

## 2021-09-29 PROCEDURE — 99214 OFFICE O/P EST MOD 30 MIN: CPT | Mod: S$GLB,,, | Performed by: INTERNAL MEDICINE

## 2021-09-29 PROCEDURE — 99999 PR PBB SHADOW E&M-EST. PATIENT-LVL V: ICD-10-PCS | Mod: PBBFAC,,, | Performed by: INTERNAL MEDICINE

## 2021-09-29 PROCEDURE — 99999 PR PBB SHADOW E&M-EST. PATIENT-LVL V: CPT | Mod: PBBFAC,,, | Performed by: INTERNAL MEDICINE

## 2021-09-29 RX ORDER — INSULIN ASPART 100 [IU]/ML
5 INJECTION, SOLUTION INTRAVENOUS; SUBCUTANEOUS
Qty: 13.5 ML | Refills: 3 | Status: SHIPPED | OUTPATIENT
Start: 2021-09-29 | End: 2021-10-06 | Stop reason: SDUPTHER

## 2021-10-06 ENCOUNTER — OFFICE VISIT (OUTPATIENT)
Dept: INTERNAL MEDICINE | Facility: CLINIC | Age: 56
End: 2021-10-06
Payer: COMMERCIAL

## 2021-10-06 VITALS
WEIGHT: 192.25 LBS | HEART RATE: 80 BPM | HEIGHT: 66 IN | SYSTOLIC BLOOD PRESSURE: 108 MMHG | RESPIRATION RATE: 24 BRPM | OXYGEN SATURATION: 95 % | BODY MASS INDEX: 30.9 KG/M2 | DIASTOLIC BLOOD PRESSURE: 70 MMHG

## 2021-10-06 DIAGNOSIS — E11.65 UNCONTROLLED TYPE 2 DIABETES MELLITUS WITH HYPERGLYCEMIA: Primary | ICD-10-CM

## 2021-10-06 DIAGNOSIS — Z12.11 COLON CANCER SCREENING: ICD-10-CM

## 2021-10-06 PROCEDURE — 99213 OFFICE O/P EST LOW 20 MIN: CPT | Mod: S$GLB,,, | Performed by: INTERNAL MEDICINE

## 2021-10-06 PROCEDURE — 99999 PR PBB SHADOW E&M-EST. PATIENT-LVL IV: ICD-10-PCS | Mod: PBBFAC,,, | Performed by: INTERNAL MEDICINE

## 2021-10-06 PROCEDURE — 99999 PR PBB SHADOW E&M-EST. PATIENT-LVL IV: CPT | Mod: PBBFAC,,, | Performed by: INTERNAL MEDICINE

## 2021-10-06 PROCEDURE — 99213 PR OFFICE/OUTPT VISIT, EST, LEVL III, 20-29 MIN: ICD-10-PCS | Mod: S$GLB,,, | Performed by: INTERNAL MEDICINE

## 2021-10-06 RX ORDER — METFORMIN HYDROCHLORIDE 1000 MG/1
1000 TABLET ORAL 2 TIMES DAILY WITH MEALS
Qty: 180 TABLET | Refills: 3 | Status: ON HOLD | OUTPATIENT
Start: 2021-10-06 | End: 2021-12-16 | Stop reason: SDUPTHER

## 2021-10-06 RX ORDER — INSULIN GLARGINE 100 [IU]/ML
40 INJECTION, SOLUTION SUBCUTANEOUS 2 TIMES DAILY
Qty: 1 BOX | Refills: 1
Start: 2021-10-06 | End: 2021-10-13 | Stop reason: SDUPTHER

## 2021-10-06 RX ORDER — INSULIN ASPART 100 [IU]/ML
15 INJECTION, SOLUTION INTRAVENOUS; SUBCUTANEOUS
Qty: 40.5 ML | Refills: 3
Start: 2021-10-06 | End: 2023-07-24

## 2021-10-13 ENCOUNTER — OFFICE VISIT (OUTPATIENT)
Dept: INTERNAL MEDICINE | Facility: CLINIC | Age: 56
End: 2021-10-13
Payer: COMMERCIAL

## 2021-10-13 VITALS
OXYGEN SATURATION: 96 % | HEIGHT: 66 IN | WEIGHT: 188.25 LBS | BODY MASS INDEX: 30.25 KG/M2 | RESPIRATION RATE: 16 BRPM | HEART RATE: 67 BPM | SYSTOLIC BLOOD PRESSURE: 108 MMHG | DIASTOLIC BLOOD PRESSURE: 66 MMHG

## 2021-10-13 DIAGNOSIS — E11.65 UNCONTROLLED TYPE 2 DIABETES MELLITUS WITH HYPERGLYCEMIA: Primary | ICD-10-CM

## 2021-10-13 PROCEDURE — 99213 OFFICE O/P EST LOW 20 MIN: CPT | Mod: S$GLB,,, | Performed by: INTERNAL MEDICINE

## 2021-10-13 PROCEDURE — 99999 PR PBB SHADOW E&M-EST. PATIENT-LVL V: CPT | Mod: PBBFAC,,, | Performed by: INTERNAL MEDICINE

## 2021-10-13 PROCEDURE — 99999 PR PBB SHADOW E&M-EST. PATIENT-LVL V: ICD-10-PCS | Mod: PBBFAC,,, | Performed by: INTERNAL MEDICINE

## 2021-10-13 PROCEDURE — 99213 PR OFFICE/OUTPT VISIT, EST, LEVL III, 20-29 MIN: ICD-10-PCS | Mod: S$GLB,,, | Performed by: INTERNAL MEDICINE

## 2021-10-13 RX ORDER — INSULIN GLARGINE 100 [IU]/ML
40 INJECTION, SOLUTION SUBCUTANEOUS 2 TIMES DAILY
Qty: 1 BOX | Refills: 1 | Status: SHIPPED | OUTPATIENT
Start: 2021-10-13 | End: 2021-12-13

## 2021-10-13 RX ORDER — DULAGLUTIDE 0.75 MG/.5ML
0.75 INJECTION, SOLUTION SUBCUTANEOUS
Qty: 4 PEN | Refills: 11 | Status: SHIPPED | OUTPATIENT
Start: 2021-10-13 | End: 2022-06-20

## 2021-10-18 ENCOUNTER — PATIENT MESSAGE (OUTPATIENT)
Dept: ADMINISTRATIVE | Facility: HOSPITAL | Age: 56
End: 2021-10-18
Payer: COMMERCIAL

## 2021-11-17 DIAGNOSIS — E11.9 TYPE 2 DIABETES MELLITUS WITHOUT COMPLICATION: ICD-10-CM

## 2021-12-10 DIAGNOSIS — E11.65 UNCONTROLLED TYPE 2 DIABETES MELLITUS WITH HYPERGLYCEMIA: ICD-10-CM

## 2021-12-13 RX ORDER — INSULIN GLARGINE 100 [IU]/ML
INJECTION, SOLUTION SUBCUTANEOUS
Qty: 15 ML | Refills: 1 | Status: SHIPPED | OUTPATIENT
Start: 2021-12-13 | End: 2022-02-11

## 2021-12-15 PROBLEM — I25.5 ISCHEMIC CARDIOMYOPATHY: Status: ACTIVE | Noted: 2021-12-15

## 2021-12-15 PROBLEM — I45.4 IVCD (INTRAVENTRICULAR CONDUCTION DEFECT): Status: ACTIVE | Noted: 2021-12-15

## 2021-12-15 PROBLEM — Z95.810 AUTOMATIC IMPLANTABLE CARDIAC DEFIBRILLATOR IN SITU: Status: ACTIVE | Noted: 2021-12-15

## 2021-12-15 PROBLEM — I50.23 CHF (CONGESTIVE HEART FAILURE), NYHA CLASS III, ACUTE ON CHRONIC, SYSTOLIC: Status: ACTIVE | Noted: 2020-05-26

## 2021-12-15 PROBLEM — Z45.02 IMPLANTABLE CARDIOVERTER-DEFIBRILLATOR (ICD) AT END OF BATTERY LIFE: Status: ACTIVE | Noted: 2021-12-15

## 2022-01-27 DIAGNOSIS — E11.9 TYPE 2 DIABETES MELLITUS WITHOUT COMPLICATION: ICD-10-CM

## 2022-02-01 ENCOUNTER — PATIENT MESSAGE (OUTPATIENT)
Dept: ADMINISTRATIVE | Facility: HOSPITAL | Age: 57
End: 2022-02-01
Payer: COMMERCIAL

## 2022-02-01 ENCOUNTER — PATIENT OUTREACH (OUTPATIENT)
Dept: ADMINISTRATIVE | Facility: HOSPITAL | Age: 57
End: 2022-02-01
Payer: COMMERCIAL

## 2022-02-09 ENCOUNTER — PATIENT OUTREACH (OUTPATIENT)
Dept: ADMINISTRATIVE | Facility: HOSPITAL | Age: 57
End: 2022-02-09
Payer: COMMERCIAL

## 2022-02-09 ENCOUNTER — PATIENT MESSAGE (OUTPATIENT)
Dept: ADMINISTRATIVE | Facility: HOSPITAL | Age: 57
End: 2022-02-09
Payer: COMMERCIAL

## 2022-02-16 ENCOUNTER — PATIENT MESSAGE (OUTPATIENT)
Dept: ADMINISTRATIVE | Facility: HOSPITAL | Age: 57
End: 2022-02-16
Payer: COMMERCIAL

## 2022-02-21 ENCOUNTER — PATIENT OUTREACH (OUTPATIENT)
Dept: ADMINISTRATIVE | Facility: HOSPITAL | Age: 57
End: 2022-02-21
Payer: COMMERCIAL

## 2022-02-21 ENCOUNTER — PATIENT MESSAGE (OUTPATIENT)
Dept: ADMINISTRATIVE | Facility: HOSPITAL | Age: 57
End: 2022-02-21
Payer: COMMERCIAL

## 2022-03-27 ENCOUNTER — HOSPITAL ENCOUNTER (EMERGENCY)
Facility: HOSPITAL | Age: 57
Discharge: HOME | End: 2022-03-28
Attending: STUDENT IN AN ORGANIZED HEALTH CARE EDUCATION/TRAINING PROGRAM
Payer: COMMERCIAL

## 2022-03-27 DIAGNOSIS — R73.9 HYPERGLYCEMIA: Primary | ICD-10-CM

## 2022-03-27 DIAGNOSIS — R11.0 NAUSEA: ICD-10-CM

## 2022-03-27 LAB
ALBUMIN SERPL BCP-MCNC: 3.5 G/DL (ref 3.5–5.2)
ALP SERPL-CCNC: 120 U/L (ref 55–135)
ALT SERPL W/O P-5'-P-CCNC: 25 U/L (ref 10–44)
ANION GAP SERPL CALC-SCNC: 16 MMOL/L (ref 8–16)
AST SERPL-CCNC: 17 U/L (ref 10–40)
B-OH-BUTYR BLD STRIP-SCNC: 0.1 MMOL/L (ref 0–0.5)
BASOPHILS # BLD AUTO: 0.04 K/UL (ref 0–0.2)
BASOPHILS NFR BLD: 0.5 % (ref 0–1.9)
BILIRUB SERPL-MCNC: 0.4 MG/DL (ref 0.1–1)
BUN SERPL-MCNC: 13 MG/DL (ref 6–20)
CALCIUM SERPL-MCNC: 8.8 MG/DL (ref 8.7–10.5)
CHLORIDE SERPL-SCNC: 99 MMOL/L (ref 95–110)
CO2 SERPL-SCNC: 20 MMOL/L (ref 23–29)
CREAT SERPL-MCNC: 1.1 MG/DL (ref 0.5–1.4)
DIFFERENTIAL METHOD: ABNORMAL
EOSINOPHIL # BLD AUTO: 0.2 K/UL (ref 0–0.5)
EOSINOPHIL NFR BLD: 2.6 % (ref 0–8)
ERYTHROCYTE [DISTWIDTH] IN BLOOD BY AUTOMATED COUNT: 14 % (ref 11.5–14.5)
EST. GFR  (AFRICAN AMERICAN): >60 ML/MIN/1.73 M^2
EST. GFR  (NON AFRICAN AMERICAN): >60 ML/MIN/1.73 M^2
GLUCOSE SERPL-MCNC: 296 MG/DL (ref 70–110)
HCT VFR BLD AUTO: 47.7 % (ref 40–54)
HGB BLD-MCNC: 16 G/DL (ref 14–18)
IMM GRANULOCYTES # BLD AUTO: 0.03 K/UL (ref 0–0.04)
IMM GRANULOCYTES NFR BLD AUTO: 0.4 % (ref 0–0.5)
LIPASE SERPL-CCNC: 38 U/L (ref 4–60)
LYMPHOCYTES # BLD AUTO: 1.6 K/UL (ref 1–4.8)
LYMPHOCYTES NFR BLD: 20.8 % (ref 18–48)
MCH RBC QN AUTO: 31.9 PG (ref 27–31)
MCHC RBC AUTO-ENTMCNC: 33.5 G/DL (ref 32–36)
MCV RBC AUTO: 95 FL (ref 82–98)
MONOCYTES # BLD AUTO: 0.6 K/UL (ref 0.3–1)
MONOCYTES NFR BLD: 7.6 % (ref 4–15)
NEUTROPHILS # BLD AUTO: 5.3 K/UL (ref 1.8–7.7)
NEUTROPHILS NFR BLD: 68.1 % (ref 38–73)
NRBC BLD-RTO: 0 /100 WBC
PLATELET # BLD AUTO: 154 K/UL (ref 150–450)
PMV BLD AUTO: 9.8 FL (ref 9.2–12.9)
POCT GLUCOSE: 235 MG/DL (ref 70–110)
POTASSIUM SERPL-SCNC: 3.5 MMOL/L (ref 3.5–5.1)
PROT SERPL-MCNC: 7.2 G/DL (ref 6–8.4)
RBC # BLD AUTO: 5.02 M/UL (ref 4.6–6.2)
SODIUM SERPL-SCNC: 135 MMOL/L (ref 136–145)
TROPONIN I SERPL DL<=0.01 NG/ML-MCNC: 0.04 NG/ML (ref 0–0.03)
WBC # BLD AUTO: 7.79 K/UL (ref 3.9–12.7)

## 2022-03-27 PROCEDURE — 96361 HYDRATE IV INFUSION ADD-ON: CPT

## 2022-03-27 PROCEDURE — C9399 UNCLASSIFIED DRUGS OR BIOLOG: HCPCS | Performed by: STUDENT IN AN ORGANIZED HEALTH CARE EDUCATION/TRAINING PROGRAM

## 2022-03-27 PROCEDURE — 82010 KETONE BODYS QUAN: CPT | Performed by: STUDENT IN AN ORGANIZED HEALTH CARE EDUCATION/TRAINING PROGRAM

## 2022-03-27 PROCEDURE — 84484 ASSAY OF TROPONIN QUANT: CPT | Performed by: STUDENT IN AN ORGANIZED HEALTH CARE EDUCATION/TRAINING PROGRAM

## 2022-03-27 PROCEDURE — 96372 THER/PROPH/DIAG INJ SC/IM: CPT | Mod: 59 | Performed by: STUDENT IN AN ORGANIZED HEALTH CARE EDUCATION/TRAINING PROGRAM

## 2022-03-27 PROCEDURE — 63600175 PHARM REV CODE 636 W HCPCS: Performed by: STUDENT IN AN ORGANIZED HEALTH CARE EDUCATION/TRAINING PROGRAM

## 2022-03-27 PROCEDURE — 80053 COMPREHEN METABOLIC PANEL: CPT | Performed by: STUDENT IN AN ORGANIZED HEALTH CARE EDUCATION/TRAINING PROGRAM

## 2022-03-27 PROCEDURE — 85025 COMPLETE CBC W/AUTO DIFF WBC: CPT | Performed by: STUDENT IN AN ORGANIZED HEALTH CARE EDUCATION/TRAINING PROGRAM

## 2022-03-27 PROCEDURE — 93005 ELECTROCARDIOGRAM TRACING: CPT

## 2022-03-27 PROCEDURE — 99284 EMERGENCY DEPT VISIT MOD MDM: CPT | Mod: 25

## 2022-03-27 PROCEDURE — 25000003 PHARM REV CODE 250: Performed by: STUDENT IN AN ORGANIZED HEALTH CARE EDUCATION/TRAINING PROGRAM

## 2022-03-27 PROCEDURE — 96374 THER/PROPH/DIAG INJ IV PUSH: CPT

## 2022-03-27 PROCEDURE — 93010 ELECTROCARDIOGRAM REPORT: CPT | Mod: ,,, | Performed by: INTERNAL MEDICINE

## 2022-03-27 PROCEDURE — 93010 EKG 12-LEAD: ICD-10-PCS | Mod: ,,, | Performed by: INTERNAL MEDICINE

## 2022-03-27 PROCEDURE — 83690 ASSAY OF LIPASE: CPT | Performed by: STUDENT IN AN ORGANIZED HEALTH CARE EDUCATION/TRAINING PROGRAM

## 2022-03-27 PROCEDURE — 82962 GLUCOSE BLOOD TEST: CPT

## 2022-03-27 RX ORDER — ONDANSETRON 4 MG/1
4 TABLET, FILM COATED ORAL EVERY 6 HOURS PRN
Qty: 12 TABLET | Refills: 0 | Status: ON HOLD | OUTPATIENT
Start: 2022-03-27 | End: 2023-05-17

## 2022-03-27 RX ORDER — ONDANSETRON 2 MG/ML
8 INJECTION INTRAMUSCULAR; INTRAVENOUS
Status: COMPLETED | OUTPATIENT
Start: 2022-03-27 | End: 2022-03-27

## 2022-03-27 RX ADMIN — ONDANSETRON HYDROCHLORIDE 8 MG: 2 SOLUTION INTRAMUSCULAR; INTRAVENOUS at 10:03

## 2022-03-27 RX ADMIN — INSULIN DETEMIR 40 UNITS: 100 INJECTION, SOLUTION SUBCUTANEOUS at 11:03

## 2022-03-27 RX ADMIN — SODIUM CHLORIDE 1000 ML: 0.9 INJECTION, SOLUTION INTRAVENOUS at 10:03

## 2022-03-28 ENCOUNTER — PATIENT MESSAGE (OUTPATIENT)
Dept: ADMINISTRATIVE | Facility: HOSPITAL | Age: 57
End: 2022-03-28
Payer: COMMERCIAL

## 2022-03-28 VITALS
HEIGHT: 66 IN | TEMPERATURE: 98 F | RESPIRATION RATE: 17 BRPM | SYSTOLIC BLOOD PRESSURE: 140 MMHG | BODY MASS INDEX: 30.22 KG/M2 | HEART RATE: 76 BPM | OXYGEN SATURATION: 96 % | WEIGHT: 188 LBS | DIASTOLIC BLOOD PRESSURE: 82 MMHG

## 2022-03-28 DIAGNOSIS — E11.65 UNCONTROLLED TYPE 2 DIABETES MELLITUS WITH HYPERGLYCEMIA: ICD-10-CM

## 2022-03-28 NOTE — TELEPHONE ENCOUNTER
Medication phoned in during your absence.       Requested Prescriptions     Pending Prescriptions Disp Refills    insulin (BASAGLAR KWIKPEN U-100 INSULIN) glargine 100 units/mL (3mL) SubQ pen 15 mL 1     Sig: INJECT 40 UNITS SUBCUTANEOUSLY TWICE DAILY

## 2022-03-28 NOTE — DISCHARGE INSTRUCTIONS
Return to the ED if you have worsening nausea/vomiting, fevers over 100.4F, abdominal pain, chest pain, shortness of breath, confusion, weakness, lethargy, or altered mental status.

## 2022-03-28 NOTE — ED TRIAGE NOTES
56 y.o. male presents to ER ED 02/ED 02A   Chief Complaint   Patient presents with    Medication Refill     Pt ran out of basaglar insulin Friday night.  Pt unable to get to pharmacy to  RX.  Has been managing BG with fast acting insulin.  Requesting Long acting insulin until he can  at pharmacy in AM.  Pt c/o nausea and lethargy,

## 2022-03-28 NOTE — TELEPHONE ENCOUNTER
----- Message from Mamie Currie MA sent at 3/28/2022  9:05 AM CDT -----  Miles DIEGO Rosey  MRN: [id number]  : 1965  PCP: Adela Gifford  Home Phone      [phone]  Work Phone      Not on file.  Mobile          [phone]  Home Phone      [phone]      MESSAGE:     Patient is out of [facility],   Please send in new script to [email or web address].

## 2022-03-28 NOTE — TELEPHONE ENCOUNTER
No new care gaps identified.  Powered by [employer]. Reference number: [id number].   3/28/2022 9:02:15 AM CDT

## 2022-03-28 NOTE — ED PROVIDER NOTES
Encounter Date: 3/27/2022    This document was partially completed using speech recognition software and may contain misspellings, grammatical errors, and/or unexpected word substitutions.       History     Chief Complaint   Patient presents with    Medication Refill     Pt ran out of basaglar insulin Friday night.  Pt unable to get to pharmacy to  RX.  Has been managing BG with fast acting insulin.  Requesting Long acting insulin until he can  at pharmacy in AM.  Pt c/o nausea and lethargy,      56-year-old male with a history of insulin-dependent diabetes, hypertension, MI, pacemaker presents to the emergency department with nausea, fatigue.  He reports that ever since Friday, he ran out of his basaglar long-acting insulin and has been using the short acting insulin instead. Has the prescription at the pharmacy but hasn't picked it up yet.         Review of patient's allergies indicates:   Allergen Reactions    Fish oil Swelling    Gabapentin Swelling     Past Medical History:   Diagnosis Date    Anticoagulant long-term use     plavix and  pletal    Anxiety and depression     Bipolar disorder     Patient denies    CHF (congestive heart failure)     Coronary artery disease     Diabetes mellitus     Dyslipidemia     Encounter for blood transfusion     GERD (gastroesophageal reflux disease)     History of claustrophobia     IF SOMEONE TOUCHES HIS NOSE    Hypertension     Irregular heartbeat     Ischemic cardiomyopathy     MI (myocardial infarction) 2009    multiple MI's    Multiple gastric ulcers     Pacemaker     PAD (peripheral artery disease)     Presence of automatic implantable cardioverter-defibrillator     PVD (peripheral vascular disease)     Sleep apnea     does not have machine      Past Surgical History:   Procedure Laterality Date    ANGIOPLASTY Bilateral     right  and left iliac stents    AORTA - BILATERAL FEMORAL ARTERY BYPASS GRAFT  07/09/2013    APPENDECTOMY       ARTERIAL THROMBECTOMY Right 02/16/2012    iliac artery    ARTERIAL THROMBECTOMY Left 02/23/2012    brachial artery    CARDIAC DEFIBRILLATOR PLACEMENT Right 10/23/2012    [employer]    CORONARY ANGIOPLASTY WITH STENT PLACEMENT      CORONARY ARTERY BYPASS GRAFT  2011    x3    CREATION OF FEMORAL-FEMORAL ARTERY BYPASS WITH GRAFT N/A 3/27/2019    Procedure: CREATION, BYPASS, ARTERIAL, FEMORAL TO FEMORAL, USING GRAFT - RIGHT TO LEFT;  Surgeon: Efren Fung MD;  Location: [facility] OR;  Service: Cardiothoracic;  Laterality: N/A;    ENDARTERECTOMY OF FEMORAL ARTERY Right 3/27/2019    Procedure: ENDARTERECTOMY, FEMORAL;  Surgeon: Efren Fung MD;  Location: [facility] OR;  Service: Cardiothoracic;  Laterality: Right;    EXPLORATION OF FEMORAL ARTERY Left 6/27/2019    Procedure: EXPLORATION, ARTERY, FEMORAL with repair;  Surgeon: Naresh Randolph MD;  Location: [facility] OR;  Service: Cardiovascular;  Laterality: Left;    FINGER SURGERY      amputation right middle finger    PERCUTANEOUS TRANSLUMINAL ANGIOPLASTY (PTA) OF PERIPHERAL VESSEL N/A 1/9/2019    Procedure: PTA, PERIPHERAL VESSEL;  Surgeon: Alex Pittman MD;  Location: [facility] CATH;  Service: Cardiology;  Laterality: N/A;  administer lytic therapy overnight on Wednesday with then plans to touch up other vessels Thursday morning January 10,2019    PERCUTANEOUS TRANSLUMINAL ANGIOPLASTY (PTA) OF PERIPHERAL VESSEL N/A 6/27/2019    Procedure: PTA, PERIPHERAL VESSEL;  Surgeon: Alex Pittman MD;  Location: [facility] CATH;  Service: Cardiology;  Laterality: N/A;    PERCUTANEOUS TRANSLUMINAL ANGIOPLASTY (PTA) OF PERIPHERAL VESSEL N/A 1/30/2020    Procedure: PTA, PERIPHERAL VESSEL;  Surgeon: Alex Pittman MD;  Location: [facility] CATH;  Service: Cardiology;  Laterality: N/A;    REPLACEMENT OF IMPLANTABLE CARDIOVERTER-DEFIBRILLATOR (ICD) GENERATOR N/A 12/15/2021    Procedure: REPLACEMENT, ICD GENERATOR with possible upgrade to CRT;  Surgeon: Houston Pineda MD;   Location: [facility] CATH;  Service: Cardiology;  Laterality: N/A;    WOUND EXPLORATION Left 6/27/2019    Procedure: EXPLORATION, WOUND  Foreign body left femoral;  Surgeon: Naresh Randolph MD;  Location: [facility] OR;  Service: Cardiovascular;  Laterality: Left;     Family History   Problem Relation Age of Onset    Cancer Mother         bone    Heart attacks under age 50 Brother     Diabetes Daughter     Hypertension Father     Heart disease Father     Hyperlipidemia Father     Heart attacks under age 50 Paternal Grandfather      Social History     Tobacco Use    Smoking status: Current Every Day Smoker     Packs/day: 1.00     Years: 40.00     Pack years: 40.00     Types: Cigarettes     Start date: 1978    Smokeless tobacco: Never Used   Substance Use Topics    Alcohol use: Not Currently    Drug use: No     Review of Systems   Constitutional: Positive for fatigue. Negative for chills and fever.   HENT: Negative for congestion, rhinorrhea and sneezing.    Eyes: Negative for discharge and redness.   Respiratory: Negative for cough and shortness of breath.    Cardiovascular: Negative for chest pain and palpitations.   Gastrointestinal: Positive for nausea. Negative for abdominal pain, diarrhea and vomiting.   Genitourinary: Negative for difficulty urinating, flank pain and urgency.   Musculoskeletal: Negative for back pain and neck pain.   Skin: Negative for rash and wound.   Neurological: Negative for weakness, numbness and headaches.       Physical Exam     Initial Vitals   BP Pulse Resp Temp SpO2   03/27/22 2156 03/27/22 2155 03/27/22 2156 03/27/22 2155 03/27/22 2156   (!) 125/59 87 18 97.5 °F (36.4 °C) 98 %      MAP       --                Physical Exam    Nursing note and vitals reviewed.  Constitutional: He appears well-developed. He is not diaphoretic. No distress.   HENT:   Head: Normocephalic and atraumatic.   Right Ear: External ear normal.   Left Ear: External ear normal.   Nose: Nose normal.   Eyes:  Conjunctivae are normal. Right eye exhibits no discharge. Left eye exhibits no discharge. No scleral icterus.   Cardiovascular: Normal rate and regular rhythm.   Pulmonary/Chest: Breath sounds normal. No stridor. No respiratory distress. He has no wheezes. He has no rhonchi. He has no rales.   Abdominal: Abdomen is soft. He exhibits no distension. There is no abdominal tenderness. There is no guarding.   Musculoskeletal:         General: No edema.     Neurological: He is alert and oriented to person, place, and time. GCS score is 15. GCS eye subscore is 4. GCS verbal subscore is 5. GCS motor subscore is 6.   Skin: Skin is warm and dry. Capillary refill takes less than 2 seconds.   Psychiatric: He has a normal mood and affect.         ED Course   Procedures  Labs Reviewed   CBC W/ AUTO DIFFERENTIAL - Abnormal; Notable for the following components:       Result Value    MCH 31.9 (*)     All other components within normal limits   COMPREHENSIVE METABOLIC PANEL - Abnormal; Notable for the following components:    Sodium 135 (*)     CO2 20 (*)     Glucose 296 (*)     All other components within normal limits   TROPONIN I - Abnormal; Notable for the following components:    Troponin I 0.042 (*)     All other components within normal limits   POCT GLUCOSE - Abnormal; Notable for the following components:    POCT Glucose 235 (*)     All other components within normal limits   LIPASE   BETA - HYDROXYBUTYRATE, SERUM   POCT GLUCOSE MONITORING CONTINUOUS     EKG Readings: (Independently Interpreted)   Previous EKG: Compared with most recent EKG Previous EKG Date: 12/15/2021.   Atrial-sensed ventricular-aced rhythm. 79 bpm. Normal axis. Normal intervals. No STEMI.       Imaging Results    None          Medications   ondansetron injection 8 mg (8 mg Intravenous Given 3/27/22 2218)   sodium chloride 0.9% bolus 1,000 mL (0 mLs Intravenous Stopped 3/27/22 2309)   insulin detemir U-100 pen 40 Units (40 Units Subcutaneous Given  3/27/22 4139)     Medical Decision Making:   Differential Diagnosis:   DDx: hyperglycemia, DKA, HHS, MI, UTI  ED Management:  Based on the patient's evaluation - patient appears well. No DKA/HHS. Treating with 40 units lantus (1:1 unit conversation to basaglar). Patient felt better in the ED after fluids, zofran. However, troponin elevated - he is DENYING any chest pain and had a ECG similar to his previous. He has had, on chart review, elevated troponin levels in the past.    I wanted to keep the patient in the ED and repeat a troponin but he states he feels well and wants to go home. He understands the risk of not repeating the troponin which may indicate an NSTEMI.     Will discharge home with close return precautions given. PCP f/u advised. Patient is in agreement with the plan. Zofran rx provided.                      Clinical Impression:   Final diagnoses:  [R73.9] Hyperglycemia (Primary)  [R11.0] Nausea          ED Disposition Condition    Discharge Stable        ED Prescriptions     Medication Sig Dispense Start Date End Date Auth. Provider    ondansetron (ZOFRAN) 4 MG tablet Take 1 tablet (4 mg total) by mouth every 6 (six) hours as needed for Nausea. 12 tablet 3/27/2022  Winston Urena DO        Follow-up Information     Follow up With Specialties Details Why Contact Info    Adela Gifford MD Internal Medicine Schedule an appointment as soon as possible for a visit in 3 days As needed [address] [address]  [phone]             Winston Urena DO  03/27/22 4284

## 2022-03-28 NOTE — TELEPHONE ENCOUNTER
No new care gaps identified.  Powered by [employer]. Reference number: [id number].   3/28/2022 9:11:07 AM CDT

## 2022-03-29 RX ORDER — INSULIN GLARGINE 100 [IU]/ML
INJECTION, SOLUTION SUBCUTANEOUS
Qty: 15 ML | Refills: 1 | Status: SHIPPED | OUTPATIENT
Start: 2022-03-29 | End: 2022-08-31 | Stop reason: SDUPTHER

## 2022-03-29 RX ORDER — INSULIN GLARGINE 100 [IU]/ML
INJECTION, SOLUTION SUBCUTANEOUS
Qty: 15 ML | Refills: 1 | Status: SHIPPED | OUTPATIENT
Start: 2022-03-29 | End: 2022-09-09 | Stop reason: SDUPTHER

## 2022-03-29 NOTE — TELEPHONE ENCOUNTER
Quick DC. Request already responded to by other means (e.g. phone or fax)   Refill Authorization Note   Miles Currie  is requesting a refill authorization.  Brief Assessment and Rationale for Refill:  Quick Discontinue  Medication Therapy Plan:       Medication Reconciliation Completed:  No      Comments:   Pended Medication(s)       Requested Prescriptions     Pending Prescriptions Disp Refills    BASAGLAR KWIKPEN U-100 INSULIN glargine 100 units/mL (3mL) SubQ pen [Pharmacy Med Name: Basaglar KwikPen U-100 Insulin 100 unit/mL (3 mL) subcutaneous] 15 mL 1     Sig: INJECT 40 UNITS SUBCUTANEOUSLY TWICE DAILY        Duplicate Pended Encounter(s)/ Last Prescribed Details: (includes pharmacy & prescriber details)   nsulin (BASAGLAR KWIKPEN U-100 INSULIN) glargine 100 units/mL (3mL) SubQ pen 15 mL 1 3/29/2022  No   Sig: INJECT 40 UNITS SUBCUTANEOUSLY TWICE DAILY   Class: Phone In   Order: [id number]   Date/Time Signed: 3/29/2022 11:08           Ordering Encounter Report    Associated Reports   View Encounter                Note composed:12:17 PM 03/29/2022

## 2022-04-04 ENCOUNTER — PATIENT MESSAGE (OUTPATIENT)
Dept: ADMINISTRATIVE | Facility: HOSPITAL | Age: 57
End: 2022-04-04
Payer: COMMERCIAL

## 2022-04-07 DIAGNOSIS — E11.9 TYPE 2 DIABETES MELLITUS WITHOUT COMPLICATION: ICD-10-CM

## 2022-04-08 DIAGNOSIS — Z76.0 MEDICATION REFILL: ICD-10-CM

## 2022-04-08 RX ORDER — TADALAFIL 20 MG/1
TABLET ORAL
Qty: 20 TABLET | Refills: 11 | Status: SHIPPED | OUTPATIENT
Start: 2022-04-08 | End: 2023-05-16

## 2022-04-08 NOTE — TELEPHONE ENCOUNTER
No new care gaps identified.  Powered by [employer] by [employer]. Reference number: [id number].   4/08/2022 9:57:51 AM CDT

## 2022-04-13 DIAGNOSIS — I25.119 CORONARY ARTERY DISEASE INVOLVING NATIVE CORONARY ARTERY OF NATIVE HEART WITH ANGINA PECTORIS: ICD-10-CM

## 2022-04-13 DIAGNOSIS — E11.65 UNCONTROLLED TYPE 2 DIABETES MELLITUS WITH HYPERGLYCEMIA: ICD-10-CM

## 2022-04-13 NOTE — TELEPHONE ENCOUNTER
Refill Routing Note   Medication(s) are not appropriate for processing by [facility] for the following reason(s):      - Required vitals are outdated  - Patient has been seen in the ED/Hospital since the last PCP visit    [facility] action(s):  Route          Medication reconciliation completed: No     Appointments  past 12m or future 3m with PCP    Date Provider   Last Visit   10/13/2021 Adela Gifford MD   Next Visit   Visit date not found Adela Gifford MD   ED visits in past 90 days: 1        Note composed:1:44 PM 04/13/2022

## 2022-04-13 NOTE — TELEPHONE ENCOUNTER
Care Due:                  Date            Visit Type   Department     Provider  --------------------------------------------------------------------------------                                EP -                              PRIMARY      [facility] INTER[facility]  Last Visit: 10-      [facility] ([facility])   MEDICINE II    Adela Gifford  Next Visit: None Scheduled  None         None Found                                                            Last  Test          Frequency    Reason                     Performed    Due Date  --------------------------------------------------------------------------------    HBA1C.......  6 months...  BART JOHN,       09- 03-                             dulaglutide, insulin.....    Powered by [employer]. Reference number: [id number].   4/13/2022 1:08:37 PM CDT

## 2022-04-21 RX ORDER — EMPAGLIFLOZIN 25 MG/1
TABLET, FILM COATED ORAL
Qty: 30 TABLET | Refills: 1 | Status: SHIPPED | OUTPATIENT
Start: 2022-04-21 | End: 2022-07-11

## 2022-04-22 RX ORDER — INSULIN GLARGINE 100 [IU]/ML
INJECTION, SOLUTION SUBCUTANEOUS
Qty: 90 ML | Refills: 0 | Status: SHIPPED | OUTPATIENT
Start: 2022-04-22 | End: 2022-08-31 | Stop reason: SDUPTHER

## 2022-04-22 NOTE — TELEPHONE ENCOUNTER
Refill Routing Note   Medication(s) are not appropriate for processing by [facility] for the following reason(s):      - Required laboratory values are outdated  - Patient has been seen in the ED/Hospital since the last PCP visit    [facility] action(s):  Defer          Medication reconciliation completed: No     Appointments  past 12m or future 3m with PCP    Date Provider   Last Visit   10/13/2021 Adela Gifford MD   Next Visit   Visit date not found Adela Gifford MD   ED visits in past 90 days: 1        Note composed:11:50 AM 04/22/2022

## 2022-04-22 NOTE — TELEPHONE ENCOUNTER
No new care gaps identified.  Powered by [employer] by [employer]. Reference number: [id number].   4/22/2022 8:02:38 AM CDT

## 2022-05-01 DIAGNOSIS — E11.65 UNCONTROLLED TYPE 2 DIABETES MELLITUS WITH HYPERGLYCEMIA: Primary | ICD-10-CM

## 2022-05-07 ENCOUNTER — PATIENT OUTREACH (OUTPATIENT)
Dept: ADMINISTRATIVE | Facility: HOSPITAL | Age: 57
End: 2022-05-07
Payer: COMMERCIAL

## 2022-05-07 NOTE — PROGRESS NOTES
Immunizations reviewed. Legacy reviewed. Care Everywhere reviewed. Media tab reviewed.   [facility] and [facility] reviewed w/ no applicable results yielded.  The patient is overdue for HgbA1c and would benefit from Diabetes Education (order previously placed).   Attempted to contact patient to discuss/schedule.   Unable to LMOM d/t VM box full.   Portal messages sent to patient recently.   Unable to reach patient at this time.         ANDREW

## 2022-05-12 ENCOUNTER — PATIENT OUTREACH (OUTPATIENT)
Dept: ADMINISTRATIVE | Facility: HOSPITAL | Age: 57
End: 2022-05-12
Payer: COMMERCIAL

## 2022-05-12 DIAGNOSIS — Z12.11 COLON CANCER SCREENING: Primary | ICD-10-CM

## 2022-05-12 NOTE — PROGRESS NOTES
Chart reviewed, immunization record updated.  No new results noted on [facility] or [facility] web site.  Care Everywhere updated.   Patient care coordination note and upcoming PCP visit updated.  Patient has scheduled PCP visit on 6/20/2022.  Fit Kit order placed.  Spoke to patient, approved to complete Fit kit. Fit kit mailed to patient.   FitKit was given to patient on 5/12/2022 11:19 AM

## 2022-05-25 DIAGNOSIS — E11.9 TYPE 2 DIABETES MELLITUS WITHOUT COMPLICATION, UNSPECIFIED WHETHER LONG TERM INSULIN USE: ICD-10-CM

## 2022-06-08 ENCOUNTER — PATIENT OUTREACH (OUTPATIENT)
Dept: ADMINISTRATIVE | Facility: HOSPITAL | Age: 57
End: 2022-06-08
Payer: COMMERCIAL

## 2022-06-20 ENCOUNTER — CLINICAL SUPPORT (OUTPATIENT)
Dept: INTERNAL MEDICINE | Facility: CLINIC | Age: 57
End: 2022-06-20
Payer: COMMERCIAL

## 2022-06-20 ENCOUNTER — TELEPHONE (OUTPATIENT)
Dept: INTERNAL MEDICINE | Facility: CLINIC | Age: 57
End: 2022-06-20
Payer: COMMERCIAL

## 2022-06-20 ENCOUNTER — PATIENT OUTREACH (OUTPATIENT)
Dept: ADMINISTRATIVE | Facility: HOSPITAL | Age: 57
End: 2022-06-20
Payer: COMMERCIAL

## 2022-06-20 ENCOUNTER — OFFICE VISIT (OUTPATIENT)
Dept: INTERNAL MEDICINE | Facility: CLINIC | Age: 57
End: 2022-06-20
Payer: COMMERCIAL

## 2022-06-20 VITALS
RESPIRATION RATE: 16 BRPM | HEIGHT: 66 IN | DIASTOLIC BLOOD PRESSURE: 74 MMHG | HEART RATE: 84 BPM | WEIGHT: 184.31 LBS | SYSTOLIC BLOOD PRESSURE: 136 MMHG | OXYGEN SATURATION: 94 % | BODY MASS INDEX: 29.62 KG/M2

## 2022-06-20 DIAGNOSIS — N52.9 ERECTILE DYSFUNCTION, UNSPECIFIED ERECTILE DYSFUNCTION TYPE: ICD-10-CM

## 2022-06-20 DIAGNOSIS — I25.119 CORONARY ARTERY DISEASE INVOLVING NATIVE CORONARY ARTERY OF NATIVE HEART WITH ANGINA PECTORIS: ICD-10-CM

## 2022-06-20 DIAGNOSIS — Z45.02 IMPLANTABLE CARDIOVERTER-DEFIBRILLATOR (ICD) AT END OF BATTERY LIFE: ICD-10-CM

## 2022-06-20 DIAGNOSIS — Z72.0 TOBACCO ABUSE: ICD-10-CM

## 2022-06-20 DIAGNOSIS — I25.5 ISCHEMIC CARDIOMYOPATHY: ICD-10-CM

## 2022-06-20 DIAGNOSIS — R11.0 NAUSEA: ICD-10-CM

## 2022-06-20 DIAGNOSIS — E11.65 UNCONTROLLED TYPE 2 DIABETES MELLITUS WITH HYPERGLYCEMIA: Primary | ICD-10-CM

## 2022-06-20 DIAGNOSIS — I73.9 PVD (PERIPHERAL VASCULAR DISEASE) WITH CLAUDICATION: ICD-10-CM

## 2022-06-20 DIAGNOSIS — K21.9 GASTROESOPHAGEAL REFLUX DISEASE WITHOUT ESOPHAGITIS: ICD-10-CM

## 2022-06-20 DIAGNOSIS — E78.5 HYPERLIPIDEMIA, UNSPECIFIED HYPERLIPIDEMIA TYPE: ICD-10-CM

## 2022-06-20 DIAGNOSIS — E11.9 TYPE 2 DIABETES MELLITUS WITHOUT COMPLICATION, UNSPECIFIED WHETHER LONG TERM INSULIN USE: ICD-10-CM

## 2022-06-20 PROCEDURE — 1160F PR REVIEW ALL MEDS BY PRESCRIBER/CLIN PHARMACIST DOCUMENTED: ICD-10-PCS | Mod: CPTII,S$GLB,, | Performed by: INTERNAL MEDICINE

## 2022-06-20 PROCEDURE — 99215 OFFICE O/P EST HI 40 MIN: CPT | Mod: S$GLB,,, | Performed by: INTERNAL MEDICINE

## 2022-06-20 PROCEDURE — 1159F MED LIST DOCD IN RCRD: CPT | Mod: CPTII,S$GLB,, | Performed by: INTERNAL MEDICINE

## 2022-06-20 PROCEDURE — 99999 PR PBB SHADOW E&M-EST. PATIENT-LVL V: ICD-10-PCS | Mod: PBBFAC,,, | Performed by: INTERNAL MEDICINE

## 2022-06-20 PROCEDURE — 3075F SYST BP GE 130 - 139MM HG: CPT | Mod: CPTII,S$GLB,, | Performed by: INTERNAL MEDICINE

## 2022-06-20 PROCEDURE — 1160F RVW MEDS BY RX/DR IN RCRD: CPT | Mod: CPTII,S$GLB,, | Performed by: INTERNAL MEDICINE

## 2022-06-20 PROCEDURE — 3008F PR BODY MASS INDEX (BMI) DOCUMENTED: ICD-10-PCS | Mod: CPTII,S$GLB,, | Performed by: INTERNAL MEDICINE

## 2022-06-20 PROCEDURE — 1159F PR MEDICATION LIST DOCUMENTED IN MEDICAL RECORD: ICD-10-PCS | Mod: CPTII,S$GLB,, | Performed by: INTERNAL MEDICINE

## 2022-06-20 PROCEDURE — 4010F PR ACE/ARB THEARPY RXD/TAKEN: ICD-10-PCS | Mod: CPTII,S$GLB,, | Performed by: INTERNAL MEDICINE

## 2022-06-20 PROCEDURE — 99999 PR PBB SHADOW E&M-EST. PATIENT-LVL V: CPT | Mod: PBBFAC,,, | Performed by: INTERNAL MEDICINE

## 2022-06-20 PROCEDURE — 4010F ACE/ARB THERAPY RXD/TAKEN: CPT | Mod: CPTII,S$GLB,, | Performed by: INTERNAL MEDICINE

## 2022-06-20 PROCEDURE — 3075F PR MOST RECENT SYSTOLIC BLOOD PRESS GE 130-139MM HG: ICD-10-PCS | Mod: CPTII,S$GLB,, | Performed by: INTERNAL MEDICINE

## 2022-06-20 PROCEDURE — 3078F DIAST BP <80 MM HG: CPT | Mod: CPTII,S$GLB,, | Performed by: INTERNAL MEDICINE

## 2022-06-20 PROCEDURE — 3078F PR MOST RECENT DIASTOLIC BLOOD PRESSURE < 80 MM HG: ICD-10-PCS | Mod: CPTII,S$GLB,, | Performed by: INTERNAL MEDICINE

## 2022-06-20 PROCEDURE — 3008F BODY MASS INDEX DOCD: CPT | Mod: CPTII,S$GLB,, | Performed by: INTERNAL MEDICINE

## 2022-06-20 PROCEDURE — 99215 PR OFFICE/OUTPT VISIT, EST, LEVL V, 40-54 MIN: ICD-10-PCS | Mod: S$GLB,,, | Performed by: INTERNAL MEDICINE

## 2022-06-20 PROCEDURE — 99215 OFFICE O/P EST HI 40 MIN: CPT | Mod: PBBFAC | Performed by: INTERNAL MEDICINE

## 2022-06-20 RX ORDER — DULAGLUTIDE 1.5 MG/.5ML
1.5 INJECTION, SOLUTION SUBCUTANEOUS
Qty: 4 PEN | Refills: 11 | Status: SHIPPED | OUTPATIENT
Start: 2022-06-20 | End: 2022-08-05

## 2022-06-20 RX ORDER — SUCRALFATE 1 G/10ML
1 SUSPENSION ORAL
Qty: 414 ML | Refills: 1 | Status: ON HOLD | OUTPATIENT
Start: 2022-06-20 | End: 2023-05-27 | Stop reason: HOSPADM

## 2022-06-20 RX ORDER — PANTOPRAZOLE SODIUM 40 MG/1
40 TABLET, DELAYED RELEASE ORAL 2 TIMES DAILY
Qty: 60 TABLET | Refills: 1 | Status: SHIPPED | OUTPATIENT
Start: 2022-06-20 | End: 2022-08-17

## 2022-06-20 RX ORDER — POTASSIUM CHLORIDE 1500 MG/1
TABLET, EXTENDED RELEASE ORAL
Status: ON HOLD | COMMUNITY
Start: 2022-06-14 | End: 2023-05-27 | Stop reason: HOSPADM

## 2022-06-20 NOTE — PROGRESS NOTES
Chart reviewed, immunization record updated.  No new results noted on [facility] or [facility] web site.  Care Everywhere updated.   Patient care coordination note updated.  Patient has scheduled PCP visit on 8/01/2022.  Patient came in for Diabetic EyeCam, unable to collect photos.  Schedule patient with Dr. León for 7/12/2022 at 10:00am, appointment details given to patient.

## 2022-06-20 NOTE — PROGRESS NOTES
"Subjective:       Patient ID: Miles Currie is a 56 y.o. male.    Chief Complaint: Nausea and Abdominal Pain      HPI:  Patient is known to me and presents with nausea with eating. Sx started 2 months ago. He is getting RUQ pain that is intermittent. + nausea but no vomiting. No constipation. intermittent diarrhea but not a/w each other. No fevers. No particular foods make it worse "everything I eat". + burning chest pains. + belching. Using TUMS which is helpful. No epigastric pain radiating to back. Has had similar sx in the past and not related to starting trulicity.     Diabetes remains uncontrolled on:   Cont basaglar 40 units BID  Cont novolog 20 units TID with meal  Cont Jardiance  Resume metformin 1000mg BID  Add trulicity 0.75 weekly--discussed dosing, how to inject and potential side effects  Home blood sugars 190s-200      He still c/o LE pain and tingling from neuropathy/PAD. Failed Gabapentin, cymbalta. States "the only thing that works in pain medication".     He is also c/o ED symptoms. meds are not effective. Has significant PVD.     Past Medical History:   Diagnosis Date    Anticoagulant long-term use     plavix and  pletal    Anxiety and depression     Bipolar disorder     Patient denies    CHF (congestive heart failure)     Coronary artery disease     Diabetes mellitus     Dyslipidemia     Encounter for blood transfusion     GERD (gastroesophageal reflux disease)     History of claustrophobia     IF SOMEONE TOUCHES HIS NOSE    Hypertension     Irregular heartbeat     Ischemic cardiomyopathy     MI (myocardial infarction) 2009    multiple MI's    Multiple gastric ulcers     Pacemaker     PAD (peripheral artery disease)     Presence of automatic implantable cardioverter-defibrillator     PVD (peripheral vascular disease)     Sleep apnea     does not have machine        Family History   Problem Relation Age of Onset    Cancer Mother         bone    Heart attacks under age 50 " Brother     Diabetes Daughter     Hypertension Father     Heart disease Father     Hyperlipidemia Father     Heart attacks under age 50 Paternal Grandfather        Social History     Socioeconomic History    Marital status: Legally    Tobacco Use    Smoking status: Current Every Day Smoker     Packs/day: 1.00     Years: 40.00     Pack years: 40.00     Types: Cigarettes     Start date: 1978    Smokeless tobacco: Never Used   Substance and Sexual Activity    Alcohol use: Not Currently    Drug use: No    Sexual activity: Yes       Review of Systems   Constitutional: Negative for activity change, fatigue, fever and unexpected weight change.   HENT: Negative for congestion, ear pain, hearing loss, rhinorrhea and sore throat.    Eyes: Negative for redness and visual disturbance.   Respiratory: Negative for cough, shortness of breath and wheezing.    Cardiovascular: Negative for chest pain, palpitations and leg swelling.   Gastrointestinal: Positive for abdominal pain and nausea. Negative for blood in stool, constipation, diarrhea and vomiting.   Genitourinary: Negative for decreased urine volume, dysuria, frequency and urgency.   Musculoskeletal: Negative for back pain, joint swelling and neck pain.   Skin: Negative for color change, rash and wound.   Neurological: Positive for numbness. Negative for dizziness, tremors, weakness, light-headedness and headaches.         Objective:      Physical Exam  Vitals reviewed.   Constitutional:       General: He is not in acute distress.     Appearance: He is well-developed.   HENT:      Head: Normocephalic and atraumatic.      Right Ear: External ear normal.      Left Ear: External ear normal.   Eyes:      General:         Right eye: No discharge.         Left eye: No discharge.      Extraocular Movements: Extraocular movements intact.      Conjunctiva/sclera: Conjunctivae normal.      Pupils: Pupils are equal, round, and reactive to light.   Neck:      Thyroid:  No thyromegaly.   Cardiovascular:      Rate and Rhythm: Normal rate and regular rhythm.      Heart sounds: No murmur heard.  Pulmonary:      Effort: Pulmonary effort is normal. No respiratory distress.      Breath sounds: Normal breath sounds. No wheezing or rales.   Abdominal:      General: Bowel sounds are normal. There is no distension.      Palpations: Abdomen is soft.      Tenderness: There is no abdominal tenderness.   Skin:     General: Skin is warm and dry.   Neurological:      Mental Status: He is alert and oriented to person, place, and time.      Cranial Nerves: No cranial nerve deficit.   Psychiatric:         Behavior: Behavior normal.         Thought Content: Thought content normal.         Assessment:       1. Uncontrolled type 2 diabetes mellitus with hyperglycemia    2. Hyperlipidemia, unspecified hyperlipidemia type    3. Coronary artery disease involving native coronary artery of native heart with angina pectoris    4. Ischemic cardiomyopathy    5. Implantable cardioverter-defibrillator (ICD) at end of battery life    6. PVD (peripheral vascular disease) with claudication    7. Tobacco abuse    8. Gastroesophageal reflux disease without esophagitis    9. Nausea    10. Erectile dysfunction, unspecified erectile dysfunction type        Plan:       Miles was seen today for nausea and abdominal pain.    Diagnoses and all orders for this visit:    Uncontrolled type 2 diabetes mellitus with hyperglycemia  -     dulaglutide (TRULICITY) 1.5 mg/0.5 mL pen injector; Inject 1.5 mg into the skin every 7 days.  -     CBC Auto Differential; Future  -     Comprehensive Metabolic Panel; Future  -     TSH; Future  -     Lipid Panel; Future  -     T4, Free; Future  -     Hemoglobin A1C; Future  Chronic uncontrolled  Increase trulicity  GI side effects sound more like reflux +/- gastroparesis and not a med side effect though certainly in the differential. Less consistent with acute pancreatitis (no epigastric pain,  no tenderness, no vomiting)  Will trial GLP-1 dose increase and monitor GI sx. Did improve is sugars so hopefully we don't have to stop    Hyperlipidemia, unspecified hyperlipidemia type  -     CBC Auto Differential; Future  -     Comprehensive Metabolic Panel; Future  -     TSH; Future  -     Lipid Panel; Future  -     T4, Free; Future  -     Hemoglobin A1C; Future  chornic satble  Cont statin  udpate labs    Coronary artery disease involving native coronary artery of native heart with angina pectoris  -     CBC Auto Differential; Future  -     Comprehensive Metabolic Panel; Future  -     TSH; Future  -     Lipid Panel; Future  -     T4, Free; Future  -     Hemoglobin A1C; Future  Chronic stable  Update labs  Denies angina sx  Follow up cards    Ischemic cardiomyopathy  -     CBC Auto Differential; Future  -     Comprehensive Metabolic Panel; Future  -     TSH; Future  -     Lipid Panel; Future  -     T4, Free; Future  -     Hemoglobin A1C; Future  Chronic stable  Update labs  No signs acute exacerbation today  Follow up cards    Implantable cardioverter-defibrillator (ICD) at end of battery life  Note due to poor EF    PVD (peripheral vascular disease) with claudication  Chronic stable but severe  Cont meds per cards  Follow up cards a planned    Tobacco abuse  Cessation counseling    Gastroesophageal reflux disease without esophagitis  -     pantoprazole (PROTONIX) 40 MG tablet; Take 1 tablet (40 mg total) by mouth 2 (two) times daily.  -     sucralfate (CARAFATE) 100 mg/mL suspension; Take 10 mLs (1 g total) by mouth 4 (four) times daily before meals and nightly.  Chronic worsening  Increase PPI to BID  Add carafate  Consider GI for EGD but high risk for any procedure  Also consider gastroparesis in Ddx, may do nuclear study    Nausea  -     pantoprazole (PROTONIX) 40 MG tablet; Take 1 tablet (40 mg total) by mouth 2 (two) times daily.  -     sucralfate (CARAFATE) 100 mg/mL suspension; Take 10 mLs (1 g total) by  mouth 4 (four) times daily before meals and nightly.  Chronic worsening  Increase PPI to BID  Add carafate  Consider GI for EGD but high risk for any procedure  Also consider gastroparesis in Ddx, may do nuclear study  [place] to less likely be due eto GLP-1 as sx were present before starting but consider    Erectile dysfunction, unspecified erectile dysfunction type  -     Ambulatory referral/consult to Urology; Future  Chronic uncontrolled  Failed meds  To urology to discuss options  Severe PVD    RTC as scheduled for orutine with labs and PRN

## 2022-07-11 ENCOUNTER — PATIENT MESSAGE (OUTPATIENT)
Dept: ADMINISTRATIVE | Facility: HOSPITAL | Age: 57
End: 2022-07-11
Payer: COMMERCIAL

## 2022-07-26 ENCOUNTER — PATIENT OUTREACH (OUTPATIENT)
Dept: INTERNAL MEDICINE | Facility: CLINIC | Age: 57
End: 2022-07-26
Payer: COMMERCIAL

## 2022-08-05 ENCOUNTER — OFFICE VISIT (OUTPATIENT)
Dept: INTERNAL MEDICINE | Facility: CLINIC | Age: 57
End: 2022-08-05
Payer: COMMERCIAL

## 2022-08-05 VITALS
HEIGHT: 66 IN | RESPIRATION RATE: 16 BRPM | BODY MASS INDEX: 29.9 KG/M2 | OXYGEN SATURATION: 97 % | HEART RATE: 61 BPM | WEIGHT: 186.06 LBS | SYSTOLIC BLOOD PRESSURE: 114 MMHG | DIASTOLIC BLOOD PRESSURE: 76 MMHG

## 2022-08-05 DIAGNOSIS — K21.9 GASTROESOPHAGEAL REFLUX DISEASE WITHOUT ESOPHAGITIS: ICD-10-CM

## 2022-08-05 DIAGNOSIS — F51.01 PRIMARY INSOMNIA: ICD-10-CM

## 2022-08-05 DIAGNOSIS — E11.65 UNCONTROLLED TYPE 2 DIABETES MELLITUS WITH HYPERGLYCEMIA: Primary | ICD-10-CM

## 2022-08-05 DIAGNOSIS — F31.9 BIPOLAR AFFECTIVE DISORDER, REMISSION STATUS UNSPECIFIED: ICD-10-CM

## 2022-08-05 DIAGNOSIS — F32.0 CURRENT MILD EPISODE OF MAJOR DEPRESSIVE DISORDER WITHOUT PRIOR EPISODE: ICD-10-CM

## 2022-08-05 PROCEDURE — 3074F PR MOST RECENT SYSTOLIC BLOOD PRESSURE < 130 MM HG: ICD-10-PCS | Mod: CPTII,S$GLB,, | Performed by: INTERNAL MEDICINE

## 2022-08-05 PROCEDURE — 4010F ACE/ARB THERAPY RXD/TAKEN: CPT | Mod: CPTII,S$GLB,, | Performed by: INTERNAL MEDICINE

## 2022-08-05 PROCEDURE — 1159F PR MEDICATION LIST DOCUMENTED IN MEDICAL RECORD: ICD-10-PCS | Mod: CPTII,S$GLB,, | Performed by: INTERNAL MEDICINE

## 2022-08-05 PROCEDURE — 99215 OFFICE O/P EST HI 40 MIN: CPT | Mod: S$GLB,,, | Performed by: INTERNAL MEDICINE

## 2022-08-05 PROCEDURE — 1159F MED LIST DOCD IN RCRD: CPT | Mod: CPTII,S$GLB,, | Performed by: INTERNAL MEDICINE

## 2022-08-05 PROCEDURE — 99215 PR OFFICE/OUTPT VISIT, EST, LEVL V, 40-54 MIN: ICD-10-PCS | Mod: S$GLB,,, | Performed by: INTERNAL MEDICINE

## 2022-08-05 PROCEDURE — 3078F PR MOST RECENT DIASTOLIC BLOOD PRESSURE < 80 MM HG: ICD-10-PCS | Mod: CPTII,S$GLB,, | Performed by: INTERNAL MEDICINE

## 2022-08-05 PROCEDURE — 99499 UNLISTED E&M SERVICE: CPT | Mod: S$GLB,,, | Performed by: INTERNAL MEDICINE

## 2022-08-05 PROCEDURE — 1160F RVW MEDS BY RX/DR IN RCRD: CPT | Mod: CPTII,S$GLB,, | Performed by: INTERNAL MEDICINE

## 2022-08-05 PROCEDURE — 99999 PR PBB SHADOW E&M-EST. PATIENT-LVL V: CPT | Mod: PBBFAC,,, | Performed by: INTERNAL MEDICINE

## 2022-08-05 PROCEDURE — 99499 RISK ADDL DX/OHS AUDIT: ICD-10-PCS | Mod: S$GLB,,, | Performed by: INTERNAL MEDICINE

## 2022-08-05 PROCEDURE — 99215 OFFICE O/P EST HI 40 MIN: CPT | Mod: PBBFAC | Performed by: INTERNAL MEDICINE

## 2022-08-05 PROCEDURE — 99999 PR PBB SHADOW E&M-EST. PATIENT-LVL V: ICD-10-PCS | Mod: PBBFAC,,, | Performed by: INTERNAL MEDICINE

## 2022-08-05 PROCEDURE — 4010F PR ACE/ARB THEARPY RXD/TAKEN: ICD-10-PCS | Mod: CPTII,S$GLB,, | Performed by: INTERNAL MEDICINE

## 2022-08-05 PROCEDURE — 3078F DIAST BP <80 MM HG: CPT | Mod: CPTII,S$GLB,, | Performed by: INTERNAL MEDICINE

## 2022-08-05 PROCEDURE — 3074F SYST BP LT 130 MM HG: CPT | Mod: CPTII,S$GLB,, | Performed by: INTERNAL MEDICINE

## 2022-08-05 PROCEDURE — 3008F BODY MASS INDEX DOCD: CPT | Mod: CPTII,S$GLB,, | Performed by: INTERNAL MEDICINE

## 2022-08-05 PROCEDURE — 3008F PR BODY MASS INDEX (BMI) DOCUMENTED: ICD-10-PCS | Mod: CPTII,S$GLB,, | Performed by: INTERNAL MEDICINE

## 2022-08-05 PROCEDURE — 1160F PR REVIEW ALL MEDS BY PRESCRIBER/CLIN PHARMACIST DOCUMENTED: ICD-10-PCS | Mod: CPTII,S$GLB,, | Performed by: INTERNAL MEDICINE

## 2022-08-05 RX ORDER — DULAGLUTIDE 3 MG/.5ML
3 INJECTION, SOLUTION SUBCUTANEOUS
Qty: 4 PEN | Refills: 11 | Status: ON HOLD | OUTPATIENT
Start: 2022-08-05 | End: 2023-05-17

## 2022-08-05 RX ORDER — TRAZODONE HYDROCHLORIDE 50 MG/1
50 TABLET ORAL NIGHTLY
Qty: 30 TABLET | Refills: 11 | Status: SHIPPED | OUTPATIENT
Start: 2022-08-05 | End: 2024-02-15 | Stop reason: ALTCHOICE

## 2022-08-05 NOTE — PROGRESS NOTES
"Subjective:       Patient ID: Miles Currie is a 56 y.o. male.    Chief Complaint: Follow-up      HPI:  Patient is known to me and presents for follow up DM and GERD.    At last visit had nausea and RUQ pain. Felt to be gastritis/gerd related as he has belching nad improvement in sx with TUMs. Added PPI and sx have resolved. No abd pain, n/v/d/c, chest pains   He was able to remains on trulicity with his GI sx.      Diabetes remains uncontrolled on:   Cont basaglar 40 units BID  Cont novolog 20 units TID with meal  Cont Jardiance  Resume metformin 1000mg BID  Increased trulicity 1.5mg weekly--discussed dosing, how to inject and potential side effects  Home blood sugars 180s-200        He still c/o LE pain and tingling from neuropathy/PAD. Failed Gabapentin, cymbalta. States "the only thing that works in pain medication".      He is also c/o ED symptoms. meds are not effective. Has significant PVD.      Today he appears distance. States he is dealing with a lot of "personal" issues. He is clearly frustrated. He gets tearful but does not disclose the issue. I asked if depressed or suidical and he denies. States he is not sleeping well; "can't turn my mind off" and wanting something to put him to sleep. Discussed meds like trazodone could take a few weeks and states "I don't want that. I'll just drink some whiskey"      Past Medical History:   Diagnosis Date    Anticoagulant long-term use     plavix and  pletal    Anxiety and depression     Bipolar disorder     Patient denies    CHF (congestive heart failure)     Coronary artery disease     Diabetes mellitus     Dyslipidemia     Encounter for blood transfusion     GERD (gastroesophageal reflux disease)     History of claustrophobia     IF SOMEONE TOUCHES HIS NOSE    Hypertension     Irregular heartbeat     Ischemic cardiomyopathy     MI (myocardial infarction) 2009    multiple MI's    Multiple gastric ulcers     Pacemaker     PAD (peripheral artery " disease)     Presence of automatic implantable cardioverter-defibrillator     PVD (peripheral vascular disease)     Sleep apnea     does not have machine        Family History   Problem Relation Age of Onset    Cancer Mother         bone    Heart attacks under age 50 Brother     Diabetes Daughter     Hypertension Father     Heart disease Father     Hyperlipidemia Father     Heart attacks under age 50 Paternal Grandfather        Social History     Socioeconomic History    Marital status: Legally    Tobacco Use    Smoking status: Current Every Day Smoker     Packs/day: 1.00     Years: 40.00     Pack years: 40.00     Types: Cigarettes     Start date: 1978    Smokeless tobacco: Never Used   Substance and Sexual Activity    Alcohol use: Not Currently    Drug use: No    Sexual activity: Yes       Review of Systems   Constitutional: Negative for activity change, fatigue, fever and unexpected weight change.   HENT: Negative for congestion, ear pain, hearing loss, rhinorrhea and sore throat.    Eyes: Negative for pain, redness and visual disturbance.   Respiratory: Negative for cough, shortness of breath and wheezing.    Cardiovascular: Negative for chest pain, palpitations and leg swelling.   Gastrointestinal: Negative for abdominal pain, constipation, diarrhea, nausea and vomiting.   Genitourinary: Negative for decreased urine volume, dysuria, frequency and urgency.   Musculoskeletal: Negative for back pain, joint swelling and neck pain.   Skin: Negative for color change, rash and wound.   Neurological: Negative for dizziness, tremors, weakness, light-headedness and headaches.   Psychiatric/Behavioral: Positive for dysphoric mood and sleep disturbance.         Objective:      Physical Exam  Vitals reviewed.   Constitutional:       General: He is not in acute distress.     Appearance: He is well-developed.   HENT:      Head: Normocephalic and atraumatic.      Right Ear: External ear normal.       Left Ear: External ear normal.   Eyes:      General:         Right eye: No discharge.         Left eye: No discharge.      Extraocular Movements: Extraocular movements intact.      Conjunctiva/sclera: Conjunctivae normal.      Pupils: Pupils are equal, round, and reactive to light.   Neck:      Thyroid: No thyromegaly.   Cardiovascular:      Rate and Rhythm: Normal rate and regular rhythm.   Pulmonary:      Effort: Pulmonary effort is normal. No respiratory distress.      Breath sounds: Normal breath sounds.   Abdominal:      General: Bowel sounds are normal. There is no distension.      Palpations: Abdomen is soft.      Tenderness: There is no abdominal tenderness.   Skin:     General: Skin is warm and dry.   Neurological:      Mental Status: He is alert and oriented to person, place, and time.      Cranial Nerves: No cranial nerve deficit.   Psychiatric:      Comments: distant  tearful             Assessment:       1. Uncontrolled type 2 diabetes mellitus with hyperglycemia    2. Gastroesophageal reflux disease without esophagitis    3. Primary insomnia    4. Bipolar affective disorder, remission status unspecified    5. Current mild episode of major depressive disorder without prior episode        Plan:       Miles was seen today for follow-up.    Diagnoses and all orders for this visit:    Uncontrolled type 2 diabetes mellitus with hyperglycemia  -     dulaglutide (TRULICITY) 3 mg/0.5 mL pen injector; Inject 3 mg into the skin every 7 days.  Increase trulicity  Cont other meds same dose  GI sx resolved with PPI so unlikely related to GLP-1. Will follow  Update labs!!!    Gastroesophageal reflux disease without esophagitis  Chronic controlled  Cont PPI same dose    Primary insomnia  -     traZODone (DESYREL) 50 MG tablet; Take 1 tablet (50 mg total) by mouth every evening.  New problem  Add trazodone  Side effects discussed today in detail  Understands may take a few weeks for full effect. Initially he doesn't  want to take but encouraged to try and avoid EtOH or any street drugs due to his other meds and chronic medical conditions    Bipolar affective disorder, remission status unspecified  Current mild episode of major depressive disorder without prior episode  Chronic uncontrolled  Clearly depressed today  He did not elaborate on the personal issues causing his feelings today  States he is taking Paxil but I have not filled---?getting elsewhere now. Would continue SSRI  Add trazodone  I asked on multiple occasions if he was having SI/HI--he denies. I do not think he is currently a harm to himself or others  Information given for suicide hotline and ER precautions discussed for any worsening of symptoms      RTC 4 weeks: follow up glucose with med changes and mood disorder. Sooner if needed    I spent 42 minutes in total on this patient encounter today including review of prior medical records, direct face to face consultation with the patient, documentation in the medical records, ordering of meds and labs and coordinating follow up care.

## 2022-08-18 ENCOUNTER — PATIENT OUTREACH (OUTPATIENT)
Dept: ADMINISTRATIVE | Facility: HOSPITAL | Age: 57
End: 2022-08-18
Payer: COMMERCIAL

## 2022-08-18 NOTE — PROGRESS NOTES
Chart reviewed, immunization record updated.  No new results noted on [facility] or [facility] web site.  Care Everywhere updated.   Patient care coordination note and upcoming PCP visit updated.  Next PCP visit 8/31/2022.  Unable to contact patient to discuss scheduling DM lab visit, Colorectal Cancer Screening and Eye Exam, no voicemail available.

## 2022-08-23 ENCOUNTER — PATIENT OUTREACH (OUTPATIENT)
Dept: ADMINISTRATIVE | Facility: HOSPITAL | Age: 57
End: 2022-08-23
Payer: COMMERCIAL

## 2022-08-31 ENCOUNTER — OFFICE VISIT (OUTPATIENT)
Dept: INTERNAL MEDICINE | Facility: CLINIC | Age: 57
End: 2022-08-31
Payer: COMMERCIAL

## 2022-08-31 ENCOUNTER — LAB VISIT (OUTPATIENT)
Dept: LAB | Facility: HOSPITAL | Age: 57
End: 2022-08-31
Attending: INTERNAL MEDICINE
Payer: COMMERCIAL

## 2022-08-31 VITALS
HEART RATE: 68 BPM | RESPIRATION RATE: 16 BRPM | BODY MASS INDEX: 28.84 KG/M2 | SYSTOLIC BLOOD PRESSURE: 106 MMHG | HEIGHT: 66 IN | DIASTOLIC BLOOD PRESSURE: 70 MMHG | WEIGHT: 179.44 LBS

## 2022-08-31 DIAGNOSIS — I50.23 CHF (CONGESTIVE HEART FAILURE), NYHA CLASS III, ACUTE ON CHRONIC, SYSTOLIC: ICD-10-CM

## 2022-08-31 DIAGNOSIS — I25.119 CORONARY ARTERY DISEASE INVOLVING NATIVE CORONARY ARTERY OF NATIVE HEART WITH ANGINA PECTORIS: ICD-10-CM

## 2022-08-31 DIAGNOSIS — E78.5 HYPERLIPIDEMIA, UNSPECIFIED HYPERLIPIDEMIA TYPE: ICD-10-CM

## 2022-08-31 DIAGNOSIS — R27.0 ATAXIA: ICD-10-CM

## 2022-08-31 DIAGNOSIS — I25.5 ISCHEMIC CARDIOMYOPATHY: ICD-10-CM

## 2022-08-31 DIAGNOSIS — E11.65 UNCONTROLLED TYPE 2 DIABETES MELLITUS WITH HYPERGLYCEMIA: ICD-10-CM

## 2022-08-31 DIAGNOSIS — E11.65 UNCONTROLLED TYPE 2 DIABETES MELLITUS WITH HYPERGLYCEMIA: Primary | ICD-10-CM

## 2022-08-31 DIAGNOSIS — E11.9 TYPE 2 DIABETES MELLITUS WITHOUT COMPLICATION: ICD-10-CM

## 2022-08-31 LAB
ALBUMIN SERPL BCP-MCNC: 3.7 G/DL (ref 3.5–5.2)
ALBUMIN/CREAT UR: 24.4 UG/MG (ref 0–30)
ALP SERPL-CCNC: 120 U/L (ref 55–135)
ALT SERPL W/O P-5'-P-CCNC: 24 U/L (ref 10–44)
ANION GAP SERPL CALC-SCNC: 17 MMOL/L (ref 8–16)
AST SERPL-CCNC: 15 U/L (ref 10–40)
BASOPHILS # BLD AUTO: 0.05 K/UL (ref 0–0.2)
BASOPHILS NFR BLD: 0.6 % (ref 0–1.9)
BILIRUB SERPL-MCNC: 1.6 MG/DL (ref 0.1–1)
BUN SERPL-MCNC: 16 MG/DL (ref 6–20)
CALCIUM SERPL-MCNC: 8.6 MG/DL (ref 8.7–10.5)
CHLORIDE SERPL-SCNC: 96 MMOL/L (ref 95–110)
CHOLEST SERPL-MCNC: 130 MG/DL (ref 120–199)
CHOLEST/HDLC SERPL: 4.3 {RATIO} (ref 2–5)
CO2 SERPL-SCNC: 25 MMOL/L (ref 23–29)
CREAT SERPL-MCNC: 1.4 MG/DL (ref 0.5–1.4)
CREAT UR-MCNC: 45 MG/DL (ref 23–375)
DIFFERENTIAL METHOD: ABNORMAL
EOSINOPHIL # BLD AUTO: 0.3 K/UL (ref 0–0.5)
EOSINOPHIL NFR BLD: 3.3 % (ref 0–8)
ERYTHROCYTE [DISTWIDTH] IN BLOOD BY AUTOMATED COUNT: 13.5 % (ref 11.5–14.5)
EST. GFR  (NO RACE VARIABLE): 59 ML/MIN/1.73 M^2
ESTIMATED AVG GLUCOSE: 174 MG/DL (ref 68–131)
GLUCOSE SERPL-MCNC: 289 MG/DL (ref 70–110)
GLUCOSE SERPL-MCNC: 290 MG/DL (ref 70–110)
HBA1C MFR BLD: 7.7 % (ref 4–5.6)
HCT VFR BLD AUTO: 47.9 % (ref 40–54)
HDLC SERPL-MCNC: 30 MG/DL (ref 40–75)
HDLC SERPL: 23.1 % (ref 20–50)
HGB BLD-MCNC: 16 G/DL (ref 14–18)
IMM GRANULOCYTES # BLD AUTO: 0.03 K/UL (ref 0–0.04)
IMM GRANULOCYTES NFR BLD AUTO: 0.4 % (ref 0–0.5)
LDLC SERPL CALC-MCNC: 44.4 MG/DL (ref 63–159)
LYMPHOCYTES # BLD AUTO: 1.6 K/UL (ref 1–4.8)
LYMPHOCYTES NFR BLD: 20.3 % (ref 18–48)
MCH RBC QN AUTO: 31.5 PG (ref 27–31)
MCHC RBC AUTO-ENTMCNC: 33.4 G/DL (ref 32–36)
MCV RBC AUTO: 94 FL (ref 82–98)
MICROALBUMIN UR DL<=1MG/L-MCNC: 11 UG/ML
MONOCYTES # BLD AUTO: 0.6 K/UL (ref 0.3–1)
MONOCYTES NFR BLD: 7.7 % (ref 4–15)
NEUTROPHILS # BLD AUTO: 5.4 K/UL (ref 1.8–7.7)
NEUTROPHILS NFR BLD: 67.7 % (ref 38–73)
NONHDLC SERPL-MCNC: 100 MG/DL
NRBC BLD-RTO: 0 /100 WBC
PLATELET # BLD AUTO: 157 K/UL (ref 150–450)
PMV BLD AUTO: 9.4 FL (ref 9.2–12.9)
POTASSIUM SERPL-SCNC: 3 MMOL/L (ref 3.5–5.1)
PROT SERPL-MCNC: 7 G/DL (ref 6–8.4)
RBC # BLD AUTO: 5.08 M/UL (ref 4.6–6.2)
SODIUM SERPL-SCNC: 138 MMOL/L (ref 136–145)
TRIGL SERPL-MCNC: 278 MG/DL (ref 30–150)
WBC # BLD AUTO: 7.97 K/UL (ref 3.9–12.7)

## 2022-08-31 PROCEDURE — 99215 OFFICE O/P EST HI 40 MIN: CPT | Mod: S$GLB,,, | Performed by: INTERNAL MEDICINE

## 2022-08-31 PROCEDURE — 85025 COMPLETE CBC W/AUTO DIFF WBC: CPT | Performed by: INTERNAL MEDICINE

## 2022-08-31 PROCEDURE — 82043 UR ALBUMIN QUANTITATIVE: CPT | Performed by: INTERNAL MEDICINE

## 2022-08-31 PROCEDURE — 82570 ASSAY OF URINE CREATININE: CPT | Performed by: INTERNAL MEDICINE

## 2022-08-31 PROCEDURE — 3078F PR MOST RECENT DIASTOLIC BLOOD PRESSURE < 80 MM HG: ICD-10-PCS | Mod: CPTII,S$GLB,, | Performed by: INTERNAL MEDICINE

## 2022-08-31 PROCEDURE — 82962 GLUCOSE BLOOD TEST: CPT | Mod: S$GLB,,, | Performed by: INTERNAL MEDICINE

## 2022-08-31 PROCEDURE — 3008F BODY MASS INDEX DOCD: CPT | Mod: CPTII,S$GLB,, | Performed by: INTERNAL MEDICINE

## 2022-08-31 PROCEDURE — 1160F PR REVIEW ALL MEDS BY PRESCRIBER/CLIN PHARMACIST DOCUMENTED: ICD-10-PCS | Mod: CPTII,S$GLB,, | Performed by: INTERNAL MEDICINE

## 2022-08-31 PROCEDURE — 1159F PR MEDICATION LIST DOCUMENTED IN MEDICAL RECORD: ICD-10-PCS | Mod: CPTII,S$GLB,, | Performed by: INTERNAL MEDICINE

## 2022-08-31 PROCEDURE — 82962 POCT GLUCOSE, HAND-HELD DEVICE: ICD-10-PCS | Mod: S$GLB,,, | Performed by: INTERNAL MEDICINE

## 2022-08-31 PROCEDURE — 99215 OFFICE O/P EST HI 40 MIN: CPT | Mod: PBBFAC | Performed by: INTERNAL MEDICINE

## 2022-08-31 PROCEDURE — 83036 HEMOGLOBIN GLYCOSYLATED A1C: CPT | Performed by: INTERNAL MEDICINE

## 2022-08-31 PROCEDURE — 36415 COLL VENOUS BLD VENIPUNCTURE: CPT | Performed by: INTERNAL MEDICINE

## 2022-08-31 PROCEDURE — 99999 PR PBB SHADOW E&M-EST. PATIENT-LVL V: CPT | Mod: PBBFAC,,, | Performed by: INTERNAL MEDICINE

## 2022-08-31 PROCEDURE — 99999 PR PBB SHADOW E&M-EST. PATIENT-LVL V: ICD-10-PCS | Mod: PBBFAC,,, | Performed by: INTERNAL MEDICINE

## 2022-08-31 PROCEDURE — 99499 UNLISTED E&M SERVICE: CPT | Mod: S$GLB,,, | Performed by: INTERNAL MEDICINE

## 2022-08-31 PROCEDURE — 99215 PR OFFICE/OUTPT VISIT, EST, LEVL V, 40-54 MIN: ICD-10-PCS | Mod: S$GLB,,, | Performed by: INTERNAL MEDICINE

## 2022-08-31 PROCEDURE — 4010F ACE/ARB THERAPY RXD/TAKEN: CPT | Mod: CPTII,S$GLB,, | Performed by: INTERNAL MEDICINE

## 2022-08-31 PROCEDURE — 3074F SYST BP LT 130 MM HG: CPT | Mod: CPTII,S$GLB,, | Performed by: INTERNAL MEDICINE

## 2022-08-31 PROCEDURE — 99499 RISK ADDL DX/OHS AUDIT: ICD-10-PCS | Mod: S$GLB,,, | Performed by: INTERNAL MEDICINE

## 2022-08-31 PROCEDURE — 80061 LIPID PANEL: CPT | Performed by: INTERNAL MEDICINE

## 2022-08-31 PROCEDURE — 4010F PR ACE/ARB THEARPY RXD/TAKEN: ICD-10-PCS | Mod: CPTII,S$GLB,, | Performed by: INTERNAL MEDICINE

## 2022-08-31 PROCEDURE — 1159F MED LIST DOCD IN RCRD: CPT | Mod: CPTII,S$GLB,, | Performed by: INTERNAL MEDICINE

## 2022-08-31 PROCEDURE — 3078F DIAST BP <80 MM HG: CPT | Mod: CPTII,S$GLB,, | Performed by: INTERNAL MEDICINE

## 2022-08-31 PROCEDURE — 80053 COMPREHEN METABOLIC PANEL: CPT | Performed by: INTERNAL MEDICINE

## 2022-08-31 PROCEDURE — 3008F PR BODY MASS INDEX (BMI) DOCUMENTED: ICD-10-PCS | Mod: CPTII,S$GLB,, | Performed by: INTERNAL MEDICINE

## 2022-08-31 PROCEDURE — 3074F PR MOST RECENT SYSTOLIC BLOOD PRESSURE < 130 MM HG: ICD-10-PCS | Mod: CPTII,S$GLB,, | Performed by: INTERNAL MEDICINE

## 2022-08-31 PROCEDURE — 1160F RVW MEDS BY RX/DR IN RCRD: CPT | Mod: CPTII,S$GLB,, | Performed by: INTERNAL MEDICINE

## 2022-08-31 RX ORDER — FLASH GLUCOSE SCANNING READER
EACH MISCELLANEOUS
Qty: 1 EACH | Refills: 1 | Status: ON HOLD | OUTPATIENT
Start: 2022-08-31 | End: 2023-05-17

## 2022-08-31 RX ORDER — FLASH GLUCOSE SENSOR
KIT MISCELLANEOUS
Qty: 1 KIT | Refills: 1 | Status: ON HOLD | OUTPATIENT
Start: 2022-08-31 | End: 2023-05-17

## 2022-08-31 NOTE — PROGRESS NOTES
"Subjective:       Patient ID: Miles Currie is a 56 y.o. male.    Chief Complaint: Diabetes (4 week follow up)      HPI:    Patient is known to me and presents for follow up DM and GERD.     At last visit had nausea and RUQ pain. Felt to be gastritis/gerd related as he has belching nad improvement in sx with TUMs. Added PPI and sx have resolved. No abd pain, n/v/d/c, chest pains   He was able to remains on trulicity with his GI sx.      Diabetes remains uncontrolled on:   Cont basaglar 40 units BID  Cont novolog 15 units TID with meal  Cont Jardiance  Resume metformin 1000mg BID  Increased trulicity 3mg weekly--discussed dosing, how to inject and potential side effects  Home blood sugars:not checking since last visit. > 200 today     Ate 9:00am--about 1 hour prior to visit.      He still c/o LE pain and tingling from neuropathy/PAD. Failed Gabapentin, cymbalta. States "the only thing that works in pain medication".      He is also c/o ED symptoms. meds are not effective. Has significant PVD.     Today he is reporting issues with walking and his speech. Sx presents for a few weeks. He is having difficulty finding his words and "struggling to get them out". Friends have noticed a change in his speech. No difficulty swallowing. He is also reports sometimes falling and "feeling like I'm drunk" when walking. Some weakness in legs noted. He is concerned he might have had a stroke.     Past Medical History:   Diagnosis Date    Anticoagulant long-term use     plavix and  pletal    Anxiety and depression     Bipolar disorder     Patient denies    CHF (congestive heart failure)     Coronary artery disease     Diabetes mellitus     Dyslipidemia     Encounter for blood transfusion     GERD (gastroesophageal reflux disease)     History of claustrophobia     IF SOMEONE TOUCHES HIS NOSE    Hypertension     Irregular heartbeat     Ischemic cardiomyopathy     MI (myocardial infarction) 2009    multiple MI's    Multiple gastric " ulcers     Pacemaker     PAD (peripheral artery disease)     Presence of automatic implantable cardioverter-defibrillator     PVD (peripheral vascular disease)     Sleep apnea     does not have machine        Family History   Problem Relation Age of Onset    Cancer Mother         bone    Heart attacks under age 50 Brother     Diabetes Daughter     Hypertension Father     Heart disease Father     Hyperlipidemia Father     Heart attacks under age 50 Paternal Grandfather        Social History     Socioeconomic History    Marital status: Legally    Tobacco Use    Smoking status: Every Day     Packs/day: 1.00     Years: 40.00     Pack years: 40.00     Types: Cigarettes     Start date: 1978    Smokeless tobacco: Never   Substance and Sexual Activity    Alcohol use: Not Currently    Drug use: No    Sexual activity: Yes       Review of Systems   Constitutional:  Negative for activity change, fatigue, fever and unexpected weight change.   HENT:  Negative for congestion, ear pain, hearing loss, rhinorrhea, sore throat and trouble swallowing.    Eyes:  Negative for redness and visual disturbance.   Respiratory:  Negative for cough, shortness of breath and wheezing.    Cardiovascular:  Negative for chest pain, palpitations and leg swelling.   Gastrointestinal:  Negative for abdominal pain, constipation, diarrhea, nausea and vomiting.   Genitourinary:  Negative for decreased urine volume, dysuria, frequency and urgency.   Musculoskeletal:  Negative for back pain, joint swelling and neck pain.   Skin:  Negative for color change, rash and wound.   Neurological:  Positive for speech difficulty and weakness. Negative for dizziness, tremors, light-headedness and headaches.       Objective:      Physical Exam  Vitals reviewed.   Constitutional:       General: He is not in acute distress.     Appearance: He is well-developed.   HENT:      Head: Normocephalic and atraumatic.      Right Ear: External ear normal.      Left Ear:  External ear normal.   Eyes:      General:         Right eye: No discharge.         Left eye: No discharge.      Extraocular Movements: Extraocular movements intact.      Conjunctiva/sclera: Conjunctivae normal.      Pupils: Pupils are equal, round, and reactive to light.   Neck:      Thyroid: No thyromegaly.   Cardiovascular:      Rate and Rhythm: Normal rate and regular rhythm.      Heart sounds: No murmur heard.  Pulmonary:      Effort: Pulmonary effort is normal. No respiratory distress.      Breath sounds: Normal breath sounds. No wheezing or rales.   Abdominal:      General: Bowel sounds are normal. There is no distension.      Palpations: Abdomen is soft.      Tenderness: There is no abdominal tenderness.   Skin:     General: Skin is warm and dry.   Neurological:      Mental Status: He is alert and oriented to person, place, and time.   Psychiatric:         Behavior: Behavior normal.         Thought Content: Thought content normal.       Assessment:       1. Uncontrolled type 2 diabetes mellitus with hyperglycemia    2. Ataxia    3. CHF (congestive heart failure), NYHA class III, acute on chronic, systolic    4. Hyperlipidemia, unspecified hyperlipidemia type        Plan:       Miles was seen today for diabetes.    Diagnoses and all orders for this visit:    Uncontrolled type 2 diabetes mellitus with hyperglycemia  -     flash glucose sensor (FREESTYLE ZACH 14 DAY SENSOR) Kit; Check blood sugar three times daily and before bed time  -     flash glucose scanning reader (FREESTYLE ZACH 14 DAY READER) Misc; Check blood sugar three times daily and before bed time  -     Comprehensive Metabolic Panel; Future  -     CBC Auto Differential; Future  -     POCT Glucose, Hand-Held Device  To labs today  Check A1C  Not checking home blood sugars, > 200 1 hour after eating so a1C will be helpful  Cont meds pending labs  If A1C still > 8% to endocrinology    Ataxia  -     MRI Brain Without Contrast; Future  -      Ambulatory referral/consult to Physical/Occupational Therapy; Future  New problem  Intermittent issues with ataxia, falls, weakness  Reports some speech difficulty though I don't appreciate much today  Start PT for gait  MRI ordered  Also consider checking B12, thiamine--? h/o EtOH    CHF (congestive heart failure), NYHA class III, acute on chronic, systolic  Chronic stable  No signs of volume overload today    Hyperlipidemia, unspecified hyperlipidemia type  Chronic improving  LDl trending down  Cont statin same dose    RTC 3months pending labs today

## 2022-09-09 DIAGNOSIS — E11.65 UNCONTROLLED TYPE 2 DIABETES MELLITUS WITH HYPERGLYCEMIA: ICD-10-CM

## 2022-09-09 RX ORDER — INSULIN GLARGINE 100 [IU]/ML
INJECTION, SOLUTION SUBCUTANEOUS
Qty: 15 ML | Refills: 1 | Status: SHIPPED | OUTPATIENT
Start: 2022-09-09 | End: 2022-10-07

## 2022-09-09 NOTE — TELEPHONE ENCOUNTER
----- Message from Catherine Chapa sent at 2022 11:17 AM CDT -----  Contact: pt  Miles Currie  MRN: [id number]  : 1965  PCP: Adela Gifford  Home Phone      [phone]  Work Phone      Not on file.  [employer]          [phone]  Home Phone      [phone]      MESSAGE: Pt left a message on the machine stating he needed his insulin refilled.   He didn't leave a call back number or a pharmacy name.

## 2022-09-09 NOTE — TELEPHONE ENCOUNTER
No new care gaps identified.  [facility] Embedded Care Gaps. Reference number: [id number]. 9/09/2022   11:41:37 AM [facility]

## 2022-10-07 DIAGNOSIS — E11.65 UNCONTROLLED TYPE 2 DIABETES MELLITUS WITH HYPERGLYCEMIA: ICD-10-CM

## 2022-10-07 RX ORDER — INSULIN GLARGINE 100 [IU]/ML
INJECTION, SOLUTION SUBCUTANEOUS
Qty: 72 ML | Refills: 1 | Status: SHIPPED | OUTPATIENT
Start: 2022-10-07 | End: 2023-02-20

## 2022-10-07 NOTE — TELEPHONE ENCOUNTER
Refill Decision Note   Miles Whittamymiri  is requesting a refill authorization.  Brief Assessment and Rationale for Refill:  Approve     Medication Therapy Plan:       Medication Reconciliation Completed: No   Comments:     No Care Gaps recommended.     Note composed:1:12 PM 10/07/2022

## 2022-10-07 NOTE — TELEPHONE ENCOUNTER
No new care gaps identified.  [facility] Embedded Care Gaps. Reference number: [id number]. 10/07/2022   8:07:37 AM [facility]

## 2022-10-21 ENCOUNTER — HOSPITAL ENCOUNTER (EMERGENCY)
Facility: HOSPITAL | Age: 57
Discharge: HOME | End: 2022-10-21
Attending: EMERGENCY MEDICINE
Payer: MEDICAID

## 2022-10-21 VITALS
BODY MASS INDEX: 29.57 KG/M2 | TEMPERATURE: 99 F | OXYGEN SATURATION: 100 % | HEART RATE: 80 BPM | RESPIRATION RATE: 18 BRPM | HEIGHT: 66 IN | WEIGHT: 184 LBS | DIASTOLIC BLOOD PRESSURE: 62 MMHG | SYSTOLIC BLOOD PRESSURE: 131 MMHG

## 2022-10-21 DIAGNOSIS — Z86.79 HISTORY OF CHF (CONGESTIVE HEART FAILURE): Primary | ICD-10-CM

## 2022-10-21 DIAGNOSIS — K04.01 PULPITIS: ICD-10-CM

## 2022-10-21 DIAGNOSIS — R06.00 DYSPNEA: ICD-10-CM

## 2022-10-21 LAB
ALBUMIN SERPL BCP-MCNC: 3.8 G/DL (ref 3.5–5.2)
ALP SERPL-CCNC: 124 U/L (ref 55–135)
ALT SERPL W/O P-5'-P-CCNC: 18 U/L (ref 10–44)
ANION GAP SERPL CALC-SCNC: 15 MMOL/L (ref 8–16)
AST SERPL-CCNC: 14 U/L (ref 10–40)
BASOPHILS # BLD AUTO: 0.08 K/UL (ref 0–0.2)
BASOPHILS NFR BLD: 1 % (ref 0–1.9)
BILIRUB SERPL-MCNC: 0.7 MG/DL (ref 0.1–1)
BNP SERPL-MCNC: 91 PG/ML (ref 0–99)
BUN SERPL-MCNC: 11 MG/DL (ref 6–20)
CALCIUM SERPL-MCNC: 9.1 MG/DL (ref 8.7–10.5)
CHLORIDE SERPL-SCNC: 97 MMOL/L (ref 95–110)
CK MB SERPL-MCNC: 3.9 NG/ML (ref 0.1–6.5)
CK MB SERPL-RTO: 2.9 % (ref 0–5)
CK SERPL-CCNC: 134 U/L (ref 20–200)
CK SERPL-CCNC: 134 U/L (ref 20–200)
CO2 SERPL-SCNC: 26 MMOL/L (ref 23–29)
CREAT SERPL-MCNC: 1 MG/DL (ref 0.5–1.4)
DIFFERENTIAL METHOD: ABNORMAL
EOSINOPHIL # BLD AUTO: 0.2 K/UL (ref 0–0.5)
EOSINOPHIL NFR BLD: 3 % (ref 0–8)
ERYTHROCYTE [DISTWIDTH] IN BLOOD BY AUTOMATED COUNT: 14.7 % (ref 11.5–14.5)
EST. GFR  (NO RACE VARIABLE): >60 ML/MIN/1.73 M^2
GLUCOSE SERPL-MCNC: 248 MG/DL (ref 70–110)
HCT VFR BLD AUTO: 43.4 % (ref 40–54)
HGB BLD-MCNC: 14.5 G/DL (ref 14–18)
IMM GRANULOCYTES # BLD AUTO: 0.03 K/UL (ref 0–0.04)
IMM GRANULOCYTES NFR BLD AUTO: 0.4 % (ref 0–0.5)
LYMPHOCYTES # BLD AUTO: 1.7 K/UL (ref 1–4.8)
LYMPHOCYTES NFR BLD: 20.8 % (ref 18–48)
MCH RBC QN AUTO: 31.5 PG (ref 27–31)
MCHC RBC AUTO-ENTMCNC: 33.4 G/DL (ref 32–36)
MCV RBC AUTO: 94 FL (ref 82–98)
MONOCYTES # BLD AUTO: 0.7 K/UL (ref 0.3–1)
MONOCYTES NFR BLD: 8.4 % (ref 4–15)
NEUTROPHILS # BLD AUTO: 5.3 K/UL (ref 1.8–7.7)
NEUTROPHILS NFR BLD: 66.4 % (ref 38–73)
NRBC BLD-RTO: 0 /100 WBC
PLATELET # BLD AUTO: 187 K/UL (ref 150–450)
PMV BLD AUTO: 9.7 FL (ref 9.2–12.9)
POTASSIUM SERPL-SCNC: 3.4 MMOL/L (ref 3.5–5.1)
PROT SERPL-MCNC: 7.2 G/DL (ref 6–8.4)
RBC # BLD AUTO: 4.6 M/UL (ref 4.6–6.2)
SODIUM SERPL-SCNC: 138 MMOL/L (ref 136–145)
TROPONIN I SERPL DL<=0.01 NG/ML-MCNC: 0.05 NG/ML (ref 0–0.03)
WBC # BLD AUTO: 7.94 K/UL (ref 3.9–12.7)

## 2022-10-21 PROCEDURE — 36415 COLL VENOUS BLD VENIPUNCTURE: CPT | Performed by: EMERGENCY MEDICINE

## 2022-10-21 PROCEDURE — 99285 EMERGENCY DEPT VISIT HI MDM: CPT | Mod: 25

## 2022-10-21 PROCEDURE — 82553 CREATINE MB FRACTION: CPT | Performed by: EMERGENCY MEDICINE

## 2022-10-21 PROCEDURE — 85025 COMPLETE CBC W/AUTO DIFF WBC: CPT | Performed by: EMERGENCY MEDICINE

## 2022-10-21 PROCEDURE — 83880 ASSAY OF NATRIURETIC PEPTIDE: CPT | Performed by: EMERGENCY MEDICINE

## 2022-10-21 PROCEDURE — 93005 ELECTROCARDIOGRAM TRACING: CPT

## 2022-10-21 PROCEDURE — 93010 EKG 12-LEAD: ICD-10-PCS | Mod: ,,, | Performed by: INTERNAL MEDICINE

## 2022-10-21 PROCEDURE — 93010 ELECTROCARDIOGRAM REPORT: CPT | Mod: ,,, | Performed by: INTERNAL MEDICINE

## 2022-10-21 PROCEDURE — 25000003 PHARM REV CODE 250: Performed by: SURGERY

## 2022-10-21 PROCEDURE — 80053 COMPREHEN METABOLIC PANEL: CPT | Performed by: EMERGENCY MEDICINE

## 2022-10-21 PROCEDURE — 84484 ASSAY OF TROPONIN QUANT: CPT | Performed by: EMERGENCY MEDICINE

## 2022-10-21 RX ORDER — TRAMADOL HYDROCHLORIDE 50 MG/1
50 TABLET ORAL EVERY 6 HOURS PRN
Qty: 10 TABLET | Refills: 0 | Status: SHIPPED | OUTPATIENT
Start: 2022-10-21 | End: 2022-10-23

## 2022-10-21 RX ORDER — TORSEMIDE 5 MG/1
10 TABLET ORAL
Status: COMPLETED | OUTPATIENT
Start: 2022-10-21 | End: 2022-10-21

## 2022-10-21 RX ORDER — AMOXICILLIN 875 MG/1
875 TABLET, FILM COATED ORAL 2 TIMES DAILY
Qty: 14 TABLET | Refills: 0 | Status: SHIPPED | OUTPATIENT
Start: 2022-10-21 | End: 2022-10-28

## 2022-10-21 RX ADMIN — TORSEMIDE 10 MG: 5 TABLET ORAL at 07:10

## 2022-10-21 NOTE — ED TRIAGE NOTES
C/o Shortness of Breath x 2 days. Patient reports missed a dose of his fluid pills Tuesday. Patient reports has a toothache.

## 2022-10-21 NOTE — ED PROVIDER NOTES
[facility]                                                  Chief Complaint  56 y.o. male with Shortness of Breath and Dental Pain    History of Present Illness  Miles Currie presents to the emergency room with complaints of dyspnea on   exertion and while in the recumbent position for 1 day.  Patient states that he   missed 2 doses of his diuretic in recent days.  He typically takes Demadex and s  pironolactone.  Patient reports some associated chest pain. He denies other   symptoms.    Past Medical History:   Diagnosis Date    Anticoagulant long-term use     plavix and  pletal    Anxiety and depression     Bipolar disorder     Patient denies    CHF (congestive heart failure)     Coronary artery disease     Diabetes mellitus     Dyslipidemia     Encounter for blood transfusion     GERD (gastroesophageal reflux disease)     History of claustrophobia     IF SOMEONE TOUCHES HIS NOSE    Hypertension     Irregular heartbeat     Ischemic cardiomyopathy     MI (myocardial infarction) 2009    multiple MI's    Multiple gastric ulcers     Pacemaker     PAD (peripheral artery disease)     Presence of automatic implantable cardioverter-defibrillator     PVD (peripheral vascular disease)     Sleep apnea     does not have machine      Past Surgical History:   Procedure Laterality Date    ANGIOPLASTY Bilateral     right  and left iliac stents    AORTA - BILATERAL FEMORAL ARTERY BYPASS GRAFT  07/09/2013    APPENDECTOMY      ARTERIAL THROMBECTOMY Right 02/16/2012    iliac artery    ARTERIAL THROMBECTOMY Left 02/23/2012    brachial artery    CARDIAC DEFIBRILLATOR PLACEMENT Right 10/23/2012    Medtronic    CORONARY ANGIOPLASTY WITH STENT PLACEMENT      CORONARY ARTERY BYPASS GRAFT  2011    x3    CREATION OF FEMORAL-FEMORAL ARTERY BYPASS WITH GRAFT N/A 3/27/2019    Procedure: CREATION, BYPASS, ARTERIAL, FEMORAL TO FEMORAL, USING GRAFT - RIGHT TO LEFT;  Surgeon: Efren Fung MD;  Location: [facility] OR;   Service: Cardiothoracic;  Laterality: N/A;    ENDARTERECTOMY OF FEMORAL ARTERY Right 3/27/2019    Procedure: ENDARTERECTOMY, FEMORAL;  Surgeon: Efren Fung MD;  Location: [facility] OR;  Service: Cardiothoracic;  Laterality: Right;    EXPLORATION OF FEMORAL ARTERY Left 6/27/2019    Procedure: EXPLORATION, ARTERY, FEMORAL with repair;  Surgeon: Naresh Randolph MD;  Location: [facility] OR;  Service: Cardiovascular;  Laterality: Left;    FINGER SURGERY      amputation right middle finger    PERCUTANEOUS TRANSLUMINAL ANGIOPLASTY (PTA) OF PERIPHERAL VESSEL N/A 1/9/2019    Procedure: PTA, PERIPHERAL VESSEL;  Surgeon: Alex Pittman MD;  Location: [facility] CATH;  Service: Cardiology;  Laterality: N/A;  administer lytic therapy overnight on Wednesday with then plans to touch up other vessels Thursday morning January 10,2019    PERCUTANEOUS TRANSLUMINAL ANGIOPLASTY (PTA) OF PERIPHERAL VESSEL N/A 6/27/2019    Procedure: PTA, PERIPHERAL VESSEL;  Surgeon: Alex Pittman MD;  Location: [facility] CATH;  Service: Cardiology;  Laterality: N/A;    PERCUTANEOUS TRANSLUMINAL ANGIOPLASTY (PTA) OF PERIPHERAL VESSEL N/A 1/30/2020    Procedure: PTA, PERIPHERAL VESSEL;  Surgeon: Alex Pittman MD;  Location: [facility] CATH;  Service: Cardiology;  Laterality: N/A;    REPLACEMENT OF IMPLANTABLE CARDIOVERTER-DEFIBRILLATOR (ICD) GENERATOR N/A 12/15/2021    Procedure: REPLACEMENT, ICD GENERATOR with possible upgrade to CRT;  Surgeon: Houston Pineda MD;  Location: [facility] CATH;  Service: Cardiology;  Laterality: N/A;    WOUND EXPLORATION Left 6/27/2019    Procedure: EXPLORATION, WOUND  Foreign body left femoral;  Surgeon: Naresh Randolph MD;  Location: [facility] OR;  Service: Cardiovascular;  Laterality: Left;      Review of patient's allergies indicates:   Allergen Reactions    Fish oil Swelling    Gabapentin Swelling        Review of Systems and Physical Exam     Review of Systems  -- Constitution - no fever, no weight loss, no loss of consciousness  -- Eyes  - no changes in vision, no redness, no swelling  -- Ear, Nose - no  earache, denies congestion  -- Mouth,Throat - no sore throat, no toothache, normal voice, normal swallowing  -- Respiratory - denies cough and congestion, reports shortness of breath, no wheezing  -- Cardiovascular - report chest pain, no palpitations,   -- Gastrointestinal - denies abdominal pain, denies nausea, vomiting, and diarrhea  -- Genitourinary - no dysuria, no denies flank pain, no hematuria or frequency   -- Musculoskeletal - denies back pain, negative for myalgias and arthralgias   -- Neurological - no headache, no neurologic changes, no loss of bladder or bowel function no seizure like activity, no changes in hearing or vision  -- Skin - denies skin changes, no rash, no hives, no suspected skin infection    Vital Signs  His oral temperature is 98.5 °F (36.9 °C).   His blood pressure is 98/53 and his pulse is 89.   His respiration is 18 and oxygen saturation is 100%.      Physical Exam  -- Nursing note and vitals reviewed  -- Constitutional:  Awake alert and oriented, GCS 15, no acute distress.  Appears well.  -- Head: Atraumatic. Normocephalic. No obvious abnormality  -- Eyes: Pupils are equal and reactive to light. Extraocular movements intact. No nystagmus.  No periorbital swelling. Normal conjunctiva.  -- Nose: Nose grossly normal in appearance, nares grossly normal. No rhinorrhea.  -- Throat: Mucous membranes moist, pharynx normal, normal tonsils.  Airway patent.  -- Ears: External ears and TM normal bilaterally. Normal hearing.   -- Neck: Normal range of motion. Neck supple. No meningismus. No adenopathy  -- Cardiac: Normal rate, regular rhythm and normal heart sounds. No carotid bruit.  Trace bilateral lower extremity edema  -- Pulmonary: Normal respiratory effort, breath sounds equal bilaterally. Adequate flow.  No wheezing.  No crackles.  -- Abdominal: Soft, no tenderness, no guarding, no rebound. Normal bowel sounds.   --  Musculoskeletal: Normal range of motion, all 4 extremities 5/5 strength.  Neurovascularly intact. Atraumatic. No deformities.  -- Neurological:  Cranial nerves 2-12 grossly intact. No focal deficits.   -- Vascular: Posterior tibial, dorsalis pedis and radial pulses 2+ bilaterally    -- Lymphatics: No cervical or peripheral lymphadenopathy.     Emergency Room Course     Treatment Course, Evaluation, and Medical Decision Making  1. Physical exam trace bilateral lower extremity edema  2. EKG  3. Cardiac enzymes  4. CBC/CMP  5. Urinalysis  6. Chest x-ray  7. Care transitioned to Dr. Jimenez at 1800 for results and dispo    Lab Results (reviewed by the physician)     K 3.4 (L)   CL 97   CO2 26   BUN 11   CREATININE 1.0    (H)   ALKPHOS 124   AST 14   ALT 18   BILITOT 0.7   ALBUMIN 3.8   PROT 7.2   WBC 7.94   HGB 14.5   HCT 43.4            CPKMB 3.9   TROPONINI 0.047 (H)   BNP 91     EKG (interpreted by the physician)  Overall Interpretation: normal rate and rhythm with no obvious ischemic changes  Normal sinus rhythm @ 80 bpm  No ST elevation or depression  No arrhythmia or QRS change noted  Similar when compared to previous EKG    Radiology (images visualized & reports reviewed by the physician)  X-Ray Chest 1 View   Final Result      Postoperative changes with no acute radiographic abnormality.         Electronically signed by: Haroon Foster   Date:    10/21/2022   Time:    18:03        Medications Given  Medications   torsemide tablet 10 mg (10 mg Oral Given 10/21/22 1905)     Medical Decision Making     ED Course/ED Management  -- I took over this patient from the previous ER physician at 6:00 p.m.  -- my documentation is below this line, she documented above in the note  -- patient came in for reported shortness of breath after missing Demadex dose  -- Demadex given here, feels much better, (-) cardiac workup, clear CXR  -- patient states I really came here for my toothache  tonight  -- poor dentition with no signs of dental abscess, Rx of Ultram/amoxicillin  -- recommend dental follow-up, recommend PCP follow-up on ER discharge  -- patient states he feels 100% better, asymptomatic on discharge tonight  -- carefully counseled return to the ER with any concerning symptoms after DC    Assessment, Disposition, & Plan      Diagnosis  [R06.00] Dyspnea  [Z86.79] History of CHF (congestive heart failure) (Primary)  [K04.01] Pulpitis    Disposition and Plan  -- Disposition: home  -- Condition: stable  -- Follow-up: Patient to follow up with Adela Gifford MD in 1-2 days.  -- I advised the patient that we have found no life threatening condition today  -- At this time, I believe the patient is clinically stable for discharge.   -- Pt understands that the visit today is to address immediate concerns   -- Further workup and evaluation as an outpatient may be required  -- The patient acknowledges that close follow up with a MD is required   -- Patient agrees to comply with all instruction and direction given in the ER    This note is dictated on M*Modal word recognition program.  There are word recognition mistakes that are occasionally missed on review.             Bernard Jimenez MD  10/21/22 2001

## 2023-02-05 ENCOUNTER — HOSPITAL ENCOUNTER (EMERGENCY)
Facility: HOSPITAL | Age: 58
Discharge: HOME | End: 2023-02-05
Attending: EMERGENCY MEDICINE
Payer: MEDICARE

## 2023-02-05 VITALS
SYSTOLIC BLOOD PRESSURE: 125 MMHG | RESPIRATION RATE: 18 BRPM | TEMPERATURE: 97 F | BODY MASS INDEX: 29.94 KG/M2 | WEIGHT: 185.5 LBS | OXYGEN SATURATION: 98 % | HEART RATE: 86 BPM | DIASTOLIC BLOOD PRESSURE: 62 MMHG

## 2023-02-05 DIAGNOSIS — K04.7 DENTAL ABSCESS: Primary | ICD-10-CM

## 2023-02-05 PROCEDURE — 25000003 PHARM REV CODE 250: Performed by: NURSE PRACTITIONER

## 2023-02-05 PROCEDURE — 99284 EMERGENCY DEPT VISIT MOD MDM: CPT | Mod: 25

## 2023-02-05 RX ORDER — AMOXICILLIN AND CLAVULANATE POTASSIUM 875; 125 MG/1; MG/1
1 TABLET, FILM COATED ORAL 2 TIMES DAILY
Qty: 20 TABLET | Refills: 0 | Status: SHIPPED | OUTPATIENT
Start: 2023-02-05 | End: 2023-02-15

## 2023-02-05 RX ORDER — AMOXICILLIN AND CLAVULANATE POTASSIUM 875; 125 MG/1; MG/1
1 TABLET, FILM COATED ORAL
Status: COMPLETED | OUTPATIENT
Start: 2023-02-05 | End: 2023-02-05

## 2023-02-05 RX ORDER — ACETAMINOPHEN AND CODEINE PHOSPHATE 300; 30 MG/1; MG/1
1 TABLET ORAL EVERY 6 HOURS PRN
Qty: 10 TABLET | Refills: 0 | Status: ON HOLD | OUTPATIENT
Start: 2023-02-05 | End: 2023-05-17

## 2023-02-05 RX ORDER — ACETAMINOPHEN AND CODEINE PHOSPHATE 300; 30 MG/1; MG/1
1 TABLET ORAL
Status: COMPLETED | OUTPATIENT
Start: 2023-02-05 | End: 2023-02-05

## 2023-02-05 RX ADMIN — AMOXICILLIN AND CLAVULANATE POTASSIUM 1 TABLET: 875; 125 TABLET, FILM COATED ORAL at 04:02

## 2023-02-05 RX ADMIN — ACETAMINOPHEN AND CODEINE PHOSPHATE 1 TABLET: 300; 30 TABLET ORAL at 04:02

## 2023-02-05 NOTE — ED PROVIDER NOTES
Encounter Date: 2/5/2023       History     Chief Complaint   Patient presents with    Dental Pain     Miles Currie is a 57 y.o. male with PMH of bipolar disorder, CHF, DM, dyslipidemia, GERD, hypertension, CAD, MI who presents the ED for evaluation of dental pain.  Patient with chronic dental pain, unable to for dental care present time who presents with right lower gingival tenderness and pain.  Symptoms started approximately 4 days ago.  He presents with a throbbing, 10/10 pain.  Pain is exacerbated by chewing.  He denies difficulty swallowing or breathing.  Denies fever, chills, or body aches.    The history is provided by the patient.   Review of patient's allergies indicates:   Allergen Reactions    Fish oil Swelling    Gabapentin Swelling     Past Medical History:   Diagnosis Date    Anticoagulant long-term use     plavix and  pletal    Anxiety and depression     Bipolar disorder     Patient denies    CHF (congestive heart failure)     Coronary artery disease     Diabetes mellitus     Dyslipidemia     Encounter for blood transfusion     GERD (gastroesophageal reflux disease)     History of claustrophobia     IF SOMEONE TOUCHES HIS NOSE    Hypertension     Irregular heartbeat     Ischemic cardiomyopathy     MI (myocardial infarction) 2009    multiple MI's    Multiple gastric ulcers     Pacemaker     PAD (peripheral artery disease)     Presence of automatic implantable cardioverter-defibrillator     PVD (peripheral vascular disease)     Sleep apnea     does not have machine      Past Surgical History:   Procedure Laterality Date    ANGIOPLASTY Bilateral     right  and left iliac stents    AORTA - BILATERAL FEMORAL ARTERY BYPASS GRAFT  07/09/2013    APPENDECTOMY      ARTERIAL THROMBECTOMY Right 02/16/2012    iliac artery    ARTERIAL THROMBECTOMY Left 02/23/2012    brachial artery    CARDIAC DEFIBRILLATOR PLACEMENT Right 10/23/2012    [employer]tr[employer]    CORONARY ANGIOPLASTY WITH STENT PLACEMENT      CORONARY ARTERY  BYPASS GRAFT  2011    x3    CREATION OF FEMORAL-FEMORAL ARTERY BYPASS WITH GRAFT N/A 3/27/2019    Procedure: CREATION, BYPASS, ARTERIAL, FEMORAL TO FEMORAL, USING GRAFT - RIGHT TO LEFT;  Surgeon: Efren Fung MD;  Location: [facility] OR;  Service: Cardiothoracic;  Laterality: N/A;    ENDARTERECTOMY OF FEMORAL ARTERY Right 3/27/2019    Procedure: ENDARTERECTOMY, FEMORAL;  Surgeon: Efren Fung MD;  Location: [facility] OR;  Service: Cardiothoracic;  Laterality: Right;    EXPLORATION OF FEMORAL ARTERY Left 6/27/2019    Procedure: EXPLORATION, ARTERY, FEMORAL with repair;  Surgeon: Naresh Randolph MD;  Location: [facility] OR;  Service: Cardiovascular;  Laterality: Left;    FINGER SURGERY      amputation right middle finger    PERCUTANEOUS TRANSLUMINAL ANGIOPLASTY (PTA) OF PERIPHERAL VESSEL N/A 1/9/2019    Procedure: PTA, PERIPHERAL VESSEL;  Surgeon: Alex Pittman MD;  Location: [facility] CATH;  Service: Cardiology;  Laterality: N/A;  administer lytic therapy overnight on Wednesday with then plans to touch up other vessels Thursday morning January 10,2019    PERCUTANEOUS TRANSLUMINAL ANGIOPLASTY (PTA) OF PERIPHERAL VESSEL N/A 6/27/2019    Procedure: PTA, PERIPHERAL VESSEL;  Surgeon: Alex Pittman MD;  Location: [facility] CATH;  Service: Cardiology;  Laterality: N/A;    PERCUTANEOUS TRANSLUMINAL ANGIOPLASTY (PTA) OF PERIPHERAL VESSEL N/A 1/30/2020    Procedure: PTA, PERIPHERAL VESSEL;  Surgeon: Alex Pittman MD;  Location: [facility] CATH;  Service: Cardiology;  Laterality: N/A;    REPLACEMENT OF IMPLANTABLE CARDIOVERTER-DEFIBRILLATOR (ICD) GENERATOR N/A 12/15/2021    Procedure: REPLACEMENT, ICD GENERATOR with possible upgrade to CRT;  Surgeon: Houston Pineda MD;  Location: [facility] CATH;  Service: Cardiology;  Laterality: N/A;    WOUND EXPLORATION Left 6/27/2019    Procedure: EXPLORATION, WOUND  Foreign body left femoral;  Surgeon: Naresh Randolph MD;  Location: [facility] OR;  Service: Cardiovascular;  Laterality: Left;     Family  History   Problem Relation Age of Onset    Cancer Mother         bone    Heart attacks under age 50 Brother     Diabetes Daughter     Hypertension Father     Heart disease Father     Hyperlipidemia Father     Heart attacks under age 50 Paternal Grandfather      Social History     Tobacco Use    Smoking status: Every Day     Packs/day: 1.00     Years: 40.00     Pack years: 40.00     Types: Cigarettes     Start date: 1978    Smokeless tobacco: Never   Substance Use Topics    Alcohol use: Not Currently    Drug use: No     Review of Systems   Constitutional:  Negative for activity change, fatigue and fever.   HENT:  Positive for dental problem. Negative for congestion, rhinorrhea and sore throat.    Respiratory:  Negative for cough, chest tightness and shortness of breath.    Cardiovascular:  Negative for chest pain.   Gastrointestinal:  Negative for abdominal pain, diarrhea, nausea and vomiting.   Genitourinary:  Negative for dysuria.   Musculoskeletal:  Negative for back pain.   Neurological:  Negative for dizziness and weakness.     Physical Exam     Initial Vitals [02/05/23 1617]   BP Pulse Resp Temp SpO2   125/62 86 18 96.7 °F (35.9 °C) 98 %      MAP       --         Physical Exam    Nursing note and vitals reviewed.  Constitutional: He appears well-developed and well-nourished.   HENT:   Head: Normocephalic and atraumatic.   Mouth/Throat: No trismus in the jaw. Dental abscesses (Right lower; no evidence of Jerrell's angina.  No trismus.) and dental caries (Multiple dental caries throughout) present.       Eyes: Conjunctivae and EOM are normal. Pupils are equal, round, and reactive to light.   Neck: Neck supple.   Cardiovascular:  Normal rate, regular rhythm, normal heart sounds and intact distal pulses.           Pulmonary/Chest: Breath sounds normal.   Abdominal: Abdomen is soft. Bowel sounds are normal.   Musculoskeletal:         General: Normal range of motion.      Cervical back: Neck supple.     Neurological:  He is alert and oriented to person, place, and time. He has normal strength.   Skin: Skin is warm and dry.   Psychiatric: He has a normal mood and affect. His behavior is normal. Judgment and thought content normal.       ED Course   Procedures  Labs Reviewed - No data to display       Imaging Results    None          Medications   acetaminophen-codeine 300-30mg per tablet 1 tablet (has no administration in time range)   amoxicillin-clavulanate 875-125mg per tablet 1 tablet (has no administration in time range)     Medical Decision Making:   Differential Diagnosis:   Dental abscess, gingivitis, bulb item  ED Management:  Evaluation of a 57-year-old male with right lower dental pain and swelling for the past several days.  Dental caries throughout, right lower gingival swelling.  No trismus or evidence of Jerrell's angina.  Patient given Tylenol No. 3 in the ED for pain control in addition to Augmentin.  He will be discharged home with close follow-up with dentist; outpatient dental  resources provided. Patient/caregiver voices understanding and feels comfortable with discharge plan.      The patient acknowledges that close follow up with medical provider is required. Instructed to follow up with PCP within 2 days. Patient was given specific return precautions. The patient agrees to comply with all instruction and directions given in the ER.                          Clinical Impression:   Final diagnoses:  [K04.7] Dental abscess (Primary)        ED Disposition Condition    Discharge Stable          ED Prescriptions       Medication Sig Dispense Start Date End Date Auth. Provider    amoxicillin-clavulanate 875-125mg (AUGMENTIN) 875-125 mg per tablet Take 1 tablet by mouth 2 (two) times daily. for 10 days 20 tablet 2/5/2023 2/15/2023 Dee Mcqueen NP    acetaminophen-codeine 300-30mg (TYLENOL #3) 300-30 mg Tab Take 1 tablet by mouth every 6 (six) hours as needed (pain). 10 tablet 2/5/2023 -- Dee Mcqueen NP           Follow-up Information       Follow up With Specialties Details Why Contact Info    [facility] Schedule an appointment as soon as possible for a visit in 2 days  [address] [address]  [phone]               Dee Mcqueen NP  02/05/23 5118

## 2023-02-06 ENCOUNTER — PATIENT OUTREACH (OUTPATIENT)
Dept: ADMINISTRATIVE | Facility: HOSPITAL | Age: 58
End: 2023-02-06
Payer: MEDICARE

## 2023-02-06 ENCOUNTER — TELEPHONE (OUTPATIENT)
Dept: EMERGENCY MEDICINE | Facility: HOSPITAL | Age: 58
End: 2023-02-06
Payer: MEDICARE

## 2023-02-06 DIAGNOSIS — Z72.0 TOBACCO USE: Primary | ICD-10-CM

## 2023-02-06 RX ORDER — AMOXICILLIN AND CLAVULANATE POTASSIUM 875; 125 MG/1; MG/1
1 TABLET, FILM COATED ORAL 2 TIMES DAILY
Qty: 20 TABLET | Refills: 0 | Status: SHIPPED | OUTPATIENT
Start: 2023-02-06 | End: 2023-02-16

## 2023-02-06 NOTE — PROGRESS NOTES
Chart reviewed, immunization record updated.  No new results noted on [facility] or [facility] web site.  Care Everywhere updated.   Patient care coordination note updated.  LOV with PCP 08/31/2022.  Order/ referral placed for Smoking Cessation.    Attempted to contact patient to discuss Diabetic eye exam, Colorectal cancer screening and Smoking cessation.  No answer, voicemail left for patient to return call to clinic.

## 2023-02-16 ENCOUNTER — TELEPHONE (OUTPATIENT)
Dept: INTERNAL MEDICINE | Facility: CLINIC | Age: 58
End: 2023-02-16
Payer: MEDICARE

## 2023-02-16 NOTE — TELEPHONE ENCOUNTER
Letter received from [facility] on patient. It states that patient's basaglar is no longer on the formulary.   Alternatives include:  Lantus U-100   Lantus Solostar U-100 pen  Levemir flextouch U-100 pen  Levemir U-100  Tresiba U-100  Tresiba flextouch U-100 pen  Tresiba flaxtouch U-200 pen

## 2023-02-20 RX ORDER — INSULIN GLARGINE 100 [IU]/ML
40 INJECTION, SOLUTION SUBCUTANEOUS 2 TIMES DAILY
Qty: 72 ML | Refills: 3 | Status: SHIPPED | OUTPATIENT
Start: 2023-02-20 | End: 2024-03-25 | Stop reason: SDUPTHER

## 2023-03-01 ENCOUNTER — TELEPHONE (OUTPATIENT)
Dept: INTERNAL MEDICINE | Facility: CLINIC | Age: 58
End: 2023-03-01
Payer: MEDICARE

## 2023-03-01 NOTE — TELEPHONE ENCOUNTER
Attempted to contact pt prior to sending anything to a DME company. Main number is not in service. LVM on alternate number.     [facility] not [facility].

## 2023-03-01 NOTE — TELEPHONE ENCOUNTER
----- Message from Ramona Steinberg sent at 3/1/2023  8:26 AM CST -----  Contact: Romelia Gee  Miles Currie  MRN: [id number]  : 1965  PCP: Adela Gifford  Home Phone      [phone]  Work Phone      Not on file.  [employer]          [phone]  Home Phone      [phone]      MESSAGE:     Neida gates [facility] called and requested med list and frequency of injection.      Please advise  [phone]  Fax  [phone]

## 2023-04-14 ENCOUNTER — HOSPITAL ENCOUNTER (EMERGENCY)
Facility: HOSPITAL | Age: 58
Discharge: HOME | End: 2023-04-14
Attending: EMERGENCY MEDICINE
Payer: MEDICARE

## 2023-04-14 VITALS
HEART RATE: 70 BPM | BODY MASS INDEX: 30.96 KG/M2 | RESPIRATION RATE: 18 BRPM | OXYGEN SATURATION: 95 % | DIASTOLIC BLOOD PRESSURE: 57 MMHG | TEMPERATURE: 99 F | SYSTOLIC BLOOD PRESSURE: 104 MMHG | WEIGHT: 191.81 LBS

## 2023-04-14 DIAGNOSIS — I82.409 DVT (DEEP VENOUS THROMBOSIS): ICD-10-CM

## 2023-04-14 DIAGNOSIS — L03.116 CELLULITIS OF LEFT LOWER EXTREMITY: Primary | ICD-10-CM

## 2023-04-14 LAB
APTT PPP: 27 SEC (ref 21–32)
BASOPHILS # BLD AUTO: 0.04 K/UL (ref 0–0.2)
BASOPHILS NFR BLD: 0.6 % (ref 0–1.9)
DIFFERENTIAL METHOD: ABNORMAL
EOSINOPHIL # BLD AUTO: 0.2 K/UL (ref 0–0.5)
EOSINOPHIL NFR BLD: 3.6 % (ref 0–8)
ERYTHROCYTE [DISTWIDTH] IN BLOOD BY AUTOMATED COUNT: 13 % (ref 11.5–14.5)
HCT VFR BLD AUTO: 39 % (ref 40–54)
HGB BLD-MCNC: 13.2 G/DL (ref 14–18)
IMM GRANULOCYTES # BLD AUTO: 0.03 K/UL (ref 0–0.04)
IMM GRANULOCYTES NFR BLD AUTO: 0.5 % (ref 0–0.5)
INR PPP: 1 (ref 0.8–1.2)
LACTATE SERPL-SCNC: 2.1 MMOL/L (ref 0.5–2.2)
LYMPHOCYTES # BLD AUTO: 1.3 K/UL (ref 1–4.8)
LYMPHOCYTES NFR BLD: 19.2 % (ref 18–48)
MCH RBC QN AUTO: 30.9 PG (ref 27–31)
MCHC RBC AUTO-ENTMCNC: 33.8 G/DL (ref 32–36)
MCV RBC AUTO: 91 FL (ref 82–98)
MONOCYTES # BLD AUTO: 0.4 K/UL (ref 0.3–1)
MONOCYTES NFR BLD: 6.2 % (ref 4–15)
NEUTROPHILS # BLD AUTO: 4.6 K/UL (ref 1.8–7.7)
NEUTROPHILS NFR BLD: 69.9 % (ref 38–73)
NRBC BLD-RTO: 0 /100 WBC
PLATELET # BLD AUTO: 161 K/UL (ref 150–450)
PMV BLD AUTO: 9.7 FL (ref 9.2–12.9)
PROTHROMBIN TIME: 10.7 SEC (ref 9–12.5)
RBC # BLD AUTO: 4.27 M/UL (ref 4.6–6.2)
WBC # BLD AUTO: 6.63 K/UL (ref 3.9–12.7)

## 2023-04-14 PROCEDURE — 87040 BLOOD CULTURE FOR BACTERIA: CPT | Performed by: EMERGENCY MEDICINE

## 2023-04-14 PROCEDURE — 83605 ASSAY OF LACTIC ACID: CPT | Performed by: EMERGENCY MEDICINE

## 2023-04-14 PROCEDURE — 36415 COLL VENOUS BLD VENIPUNCTURE: CPT | Performed by: EMERGENCY MEDICINE

## 2023-04-14 PROCEDURE — 96365 THER/PROPH/DIAG IV INF INIT: CPT | Mod: HCNC

## 2023-04-14 PROCEDURE — 85610 PROTHROMBIN TIME: CPT | Performed by: EMERGENCY MEDICINE

## 2023-04-14 PROCEDURE — 99285 EMERGENCY DEPT VISIT HI MDM: CPT | Mod: 25,HCNC

## 2023-04-14 PROCEDURE — 25000003 PHARM REV CODE 250: Performed by: EMERGENCY MEDICINE

## 2023-04-14 PROCEDURE — 85730 THROMBOPLASTIN TIME PARTIAL: CPT | Performed by: EMERGENCY MEDICINE

## 2023-04-14 PROCEDURE — 85025 COMPLETE CBC W/AUTO DIFF WBC: CPT | Performed by: EMERGENCY MEDICINE

## 2023-04-14 RX ORDER — CLINDAMYCIN HYDROCHLORIDE 300 MG/1
300 CAPSULE ORAL EVERY 8 HOURS
Qty: 30 CAPSULE | Refills: 0 | Status: SHIPPED | OUTPATIENT
Start: 2023-04-14 | End: 2023-04-24

## 2023-04-14 RX ORDER — CLINDAMYCIN PHOSPHATE 600 MG/50ML
600 INJECTION, SOLUTION INTRAVENOUS
Status: COMPLETED | OUTPATIENT
Start: 2023-04-14 | End: 2023-04-14

## 2023-04-14 RX ADMIN — CLINDAMYCIN PHOSPHATE 600 MG: 600 INJECTION, SOLUTION INTRAVENOUS at 02:04

## 2023-04-14 NOTE — ED PROVIDER NOTES
Encounter Date: 4/14/2023       History     Chief Complaint   Patient presents with    Leg Swelling     Pt struck a piece of pipe causing an abrasion to his left shin on 4/11/23.  Pt c/o pain and swelling to LLE.  Abrasion noted toleft shin area,  redness and warm to touch noted.  Pt denies fever, N/V     56 YO MALE WHO COMES IN TODAY DUE TO LEFT LOWER EXTREMITY WARMTH, ERYTHEMA, AND EDEMA.  HE STATES THAT IT HAS BEEN PRESENT TIMES THE LAST TWO DAYS.  THE PATIENT DENIES ANY CHEST PAIN, SHORTNESS OF BREATH, FEVER, CHILLS, COUGH, OR OTHER COMPLAINTS.       Review of patient's allergies indicates:   Allergen Reactions    Fish oil Swelling    Gabapentin Swelling     Past Medical History:   Diagnosis Date    Anticoagulant long-term use     plavix and  pletal    Anxiety and depression     Bipolar disorder     Patient denies    CHF (congestive heart failure)     Coronary artery disease     Diabetes mellitus     Dyslipidemia     Encounter for blood transfusion     GERD (gastroesophageal reflux disease)     History of claustrophobia     IF SOMEONE TOUCHES HIS NOSE    Hypertension     Irregular heartbeat     Ischemic cardiomyopathy     MI (myocardial infarction) 2009    multiple MI's    Multiple gastric ulcers     Pacemaker     PAD (peripheral artery disease)     Presence of automatic implantable cardioverter-defibrillator     PVD (peripheral vascular disease)     Sleep apnea     does not have machine      Past Surgical History:   Procedure Laterality Date    ANGIOPLASTY Bilateral     right  and left iliac stents    AORTA - BILATERAL FEMORAL ARTERY BYPASS GRAFT  07/09/2013    APPENDECTOMY      ARTERIAL THROMBECTOMY Right 02/16/2012    iliac artery    ARTERIAL THROMBECTOMY Left 02/23/2012    brachial artery    CARDIAC DEFIBRILLATOR PLACEMENT Right 10/23/2012    [employer]    CORONARY ANGIOPLASTY WITH STENT PLACEMENT      CORONARY ARTERY BYPASS GRAFT  2011    x3    CREATION OF FEMORAL-FEMORAL ARTERY BYPASS WITH GRAFT N/A  3/27/2019    Procedure: CREATION, BYPASS, ARTERIAL, FEMORAL TO FEMORAL, USING GRAFT - RIGHT TO LEFT;  Surgeon: Efren Fung MD;  [facility];  Service: Cardiothoracic;  Laterality: N/A;    ENDARTERECTOMY OF FEMORAL ARTERY Right 3/27/2019    Procedure: ENDARTERECTOMY, FEMORAL;  Surgeon: Efren Fung MD;  [facility];  Service: Cardiothoracic;  Laterality: Right;    EXPLORATION OF FEMORAL ARTERY Left 6/27/2019    Procedure: EXPLORATION, ARTERY, FEMORAL with repair;  Surgeon: Naresh Randolph MD;  [facility];  Service: Cardiovascular;  Laterality: Left;    FINGER SURGERY      amputation right middle finger    PERCUTANEOUS TRANSLUMINAL ANGIOPLASTY (PTA) OF PERIPHERAL VESSEL N/A 1/9/2019    Procedure: PTA, PERIPHERAL VESSEL;  Surgeon: Alex Pittman MD;  [facility];  Service: Cardiology;  Laterality: N/A;  administer lytic therapy overnight on Wednesday with then plans to touch up other vessels Thursday morning January 10,2019    PERCUTANEOUS TRANSLUMINAL ANGIOPLASTY (PTA) OF PERIPHERAL VESSEL N/A 6/27/2019    Procedure: PTA, PERIPHERAL VESSEL;  Surgeon: Alex Pittman MD;  [facility];  Service: Cardiology;  Laterality: N/A;    PERCUTANEOUS TRANSLUMINAL ANGIOPLASTY (PTA) OF PERIPHERAL VESSEL N/A 1/30/2020    Procedure: PTA, PERIPHERAL VESSEL;  Surgeon: Alex Pittman MD;  [facility];  Service: Cardiology;  Laterality: N/A;    REPLACEMENT OF IMPLANTABLE CARDIOVERTER-DEFIBRILLATOR (ICD) GENERATOR N/A 12/15/2021    Procedure: REPLACEMENT, ICD GENERATOR with possible upgrade to CRT;  Surgeon: Houston Pineda MD;  [facility];  Service: Cardiology;  Laterality: N/A;    WOUND EXPLORATION Left 6/27/2019    Procedure: EXPLORATION, WOUND  Foreign body left femoral;  Surgeon: Naresh Randolph MD;  [facility];  Service: Cardiovascular;  Laterality: Left;     Family History   Problem Relation Age of Onset    Cancer Mother         bone    Heart  attacks under age 50 Brother     Diabetes Daughter     Hypertension Father     Heart disease Father     Hyperlipidemia Father     Heart attacks under age 50 Paternal Grandfather      Social History     Tobacco Use    Smoking status: Every Day     Packs/day: 1.00     Years: 40.00     Pack years: 40.00     Types: Cigarettes     Start date: 1978    Smokeless tobacco: Never   Substance Use Topics    Alcohol use: Not Currently    Drug use: No     Review of Systems   Constitutional: Negative.    HENT: Negative.     Eyes: Negative.    Respiratory: Negative.     Cardiovascular: Negative.    Gastrointestinal: Negative.    Genitourinary: Negative.    Musculoskeletal:  Positive for arthralgias and myalgias.   Skin:         WARMTH, ERYTHEMA    Hematological: Negative.    Psychiatric/Behavioral: Negative.       Physical Exam     Initial Vitals [04/14/23 0119]   BP Pulse Resp Temp SpO2   (!) 119/56 78 18 98.8 °F (37.1 °C) 97 %      MAP       --         Physical Exam    Nursing note and vitals reviewed.  Constitutional: He appears well-developed and well-nourished.   HENT:   Head: Normocephalic and atraumatic.   Eyes: EOM are normal. Pupils are equal, round, and reactive to light.   Neck: Neck supple.   Normal range of motion.  Cardiovascular:  Normal rate, regular rhythm and normal heart sounds.           Pulmonary/Chest: Breath sounds normal.   Abdominal: Abdomen is soft.   Musculoskeletal:         General: Tenderness present.      Cervical back: Normal range of motion and neck supple.      Comments: LEFT LOWER EXTREMITY WARMTH, TENDERNESS, AND ERYTHEMA ON EVALUATION     Neurological: He is alert and oriented to person, place, and time.   Skin: Skin is warm.   Psychiatric: He has a normal mood and affect.       ED Course   Procedures  Labs Reviewed   CBC W/ AUTO DIFFERENTIAL - Abnormal; Notable for the following components:       Result Value    RBC 4.27 (*)     Hemoglobin 13.2 (*)     Hematocrit 39.0 (*)     All other  components within normal limits   CULTURE, BLOOD   CULTURE, BLOOD   LACTIC ACID, PLASMA   APTT   PROTIME-INR          Imaging Results              US Lower Extremity Veins Left (In process)                   X-Rays:   Independently Interpreted Readings:   Other Readings:  LEFT LOWER EXTREMITY DOPPLER IS NEGATIVE PER ULTRASOUND.    Medications   clindamycin in D5W 600 mg/50 mL IVPB 600 mg (600 mg Intravenous New Bag 4/14/23 0225)     Medical Decision Making:   Differential Diagnosis:   DVT, CELLULITIS, VENOUS STASIS, VENOUS INSUFFICIENCY   ED Management:  58 YO MALE WHO COMES IN TODAY DUE TO LEFT LOWER EXTREMITY PAIN.  NO DVT NOTED PER   ULTRASOUND.  HOME TODAY WITH ORAL ANTIBIOTICS.                         Clinical Impression:   Final diagnoses:  [I82.409] DVT (deep venous thrombosis)  [L03.116] Cellulitis of left lower extremity (Primary)        ED Disposition Condition    Discharge Stable          ED Prescriptions       Medication Sig Dispense Start Date End Date Auth. Provider    clindamycin (CLEOCIN) 300 MG capsule Take 1 capsule (300 mg total) by mouth every 8 (eight) hours. for 10 days 30 capsule 4/14/2023 4/24/2023 Mariana Palma MD          Follow-up Information       Follow up With Specialties Details Why Contact Info    Adela Gifford MD Internal Medicine  As needed [address] [address] [phone]               Mariana Palma MD  04/14/23 1003

## 2023-04-14 NOTE — DISCHARGE INSTRUCTIONS
TAKE MEDICINE AS PRESCRIBED.  FOLLOW UP WITH YOUR PHYSICIAN AS SCHEDULED.  RETURN TO THE ED FOR WORSENING OF CONDITION.

## 2023-04-19 LAB
BACTERIA BLD CULT: NORMAL
BACTERIA BLD CULT: NORMAL

## 2023-05-04 ENCOUNTER — HOSPITAL ENCOUNTER (EMERGENCY)
Facility: HOSPITAL | Age: 58
Discharge: HOME | End: 2023-05-04
Attending: SURGERY
Payer: MEDICARE

## 2023-05-04 VITALS
DIASTOLIC BLOOD PRESSURE: 76 MMHG | RESPIRATION RATE: 18 BRPM | HEIGHT: 70 IN | HEART RATE: 86 BPM | SYSTOLIC BLOOD PRESSURE: 139 MMHG | TEMPERATURE: 98 F | BODY MASS INDEX: 27.34 KG/M2 | WEIGHT: 190.94 LBS | OXYGEN SATURATION: 100 %

## 2023-05-04 DIAGNOSIS — K04.01 PULPITIS: Primary | ICD-10-CM

## 2023-05-04 DIAGNOSIS — K08.9 CHRONIC DENTAL PAIN: ICD-10-CM

## 2023-05-04 DIAGNOSIS — G89.29 CHRONIC DENTAL PAIN: ICD-10-CM

## 2023-05-04 PROCEDURE — 96372 THER/PROPH/DIAG INJ SC/IM: CPT | Performed by: SURGERY

## 2023-05-04 PROCEDURE — 63600175 PHARM REV CODE 636 W HCPCS: Mod: HCNC | Performed by: SURGERY

## 2023-05-04 PROCEDURE — 99284 EMERGENCY DEPT VISIT MOD MDM: CPT | Mod: HCNC

## 2023-05-04 RX ORDER — HYDROCODONE BITARTRATE AND ACETAMINOPHEN 10; 325 MG/1; MG/1
1 TABLET ORAL EVERY 6 HOURS PRN
Qty: 15 TABLET | Refills: 0 | Status: SHIPPED | OUTPATIENT
Start: 2023-05-04 | End: 2023-05-08

## 2023-05-04 RX ORDER — KETOROLAC TROMETHAMINE 30 MG/ML
60 INJECTION, SOLUTION INTRAMUSCULAR; INTRAVENOUS
Status: COMPLETED | OUTPATIENT
Start: 2023-05-04 | End: 2023-05-04

## 2023-05-04 RX ORDER — AMOXICILLIN 875 MG/1
875 TABLET, FILM COATED ORAL 2 TIMES DAILY
Qty: 14 TABLET | Refills: 0 | Status: SHIPPED | OUTPATIENT
Start: 2023-05-04 | End: 2023-05-11

## 2023-05-04 RX ORDER — IBUPROFEN 800 MG/1
800 TABLET ORAL EVERY 6 HOURS PRN
Qty: 20 TABLET | Refills: 0 | Status: ON HOLD | OUTPATIENT
Start: 2023-05-04 | End: 2023-05-17

## 2023-05-04 RX ADMIN — KETOROLAC TROMETHAMINE 60 MG: 30 INJECTION, SOLUTION INTRAMUSCULAR at 03:05

## 2023-05-04 NOTE — ED TRIAGE NOTES
PT PRESENT TO THE ED C/O DENTAL PAIN FOR SEVERAL MONTHS. SPOKE WITH HIS DENTIST MONTHS AGO ABOUT GETTING ALL TEETH REMOVED. WAITING TO GET FINANCES TOGETHER. PATIENT STATES A THROBBING PAIN TO THE RIGHT SIDE OF MOUTH. NOT CURRENTLY TAKING ANY MEDICATION.

## 2023-05-04 NOTE — ED PROVIDER NOTES
Encounter Date: 5/4/2023       History     Chief Complaint   Patient presents with    Dental Pain     PT PRESENT TO THE ED C/O DENTAL PAIN FOR SEVERAL MONTHS. SPOKE WITH HIS DENTIST MONTHS AGO ABOUT GETTING ALL TEETH REMOVED. WAITING TO GET FINANCES TOGETHER. PATIENT STATES A THROBBING PAIN TO THE RIGHT SIDE OF MOUTH. NOT CURRENTLY TAKING ANY MEDICATION.      Miles Currie is a 57 y.o. male that presents with dental pain today  Patient has severe poor dentition in all teeth with no facial swelling  No obvious signs of dental abscess on clinical evaluation today  No fever, no complicating factors, 6/10 pain with meals this evening  Patient has been to ER several times for dental pain past 2 years  Patient states he is finally going to get a dentist for some assistance    Review of patient's allergies indicates:   Allergen Reactions    Fish oil Swelling    Gabapentin Swelling     Past Medical History:   Diagnosis Date    Anticoagulant long-term use     plavix and  pletal    Anxiety and depression     Bipolar disorder     Patient denies    CHF (congestive heart failure)     Coronary artery disease     Diabetes mellitus     Dyslipidemia     Encounter for blood transfusion     GERD (gastroesophageal reflux disease)     History of claustrophobia     IF SOMEONE TOUCHES HIS NOSE    Hypertension     Irregular heartbeat     Ischemic cardiomyopathy     MI (myocardial infarction) 2009    multiple MI's    Multiple gastric ulcers     Pacemaker     PAD (peripheral artery disease)     Presence of automatic implantable cardioverter-defibrillator     PVD (peripheral vascular disease)     Sleep apnea     does not have machine      Past Surgical History:   Procedure Laterality Date    ANGIOPLASTY Bilateral     right  and left iliac stents    AORTA - BILATERAL FEMORAL ARTERY BYPASS GRAFT  07/09/2013    APPENDECTOMY      ARTERIAL THROMBECTOMY Right 02/16/2012    iliac artery    ARTERIAL THROMBECTOMY Left 02/23/2012    brachial artery     CARDIAC DEFIBRILLATOR PLACEMENT Right 10/23/2012    [employer]    CORONARY ANGIOPLASTY WITH STENT PLACEMENT      CORONARY ARTERY BYPASS GRAFT  2011    x3    CREATION OF FEMORAL-FEMORAL ARTERY BYPASS WITH GRAFT N/A 3/27/2019    Procedure: CREATION, BYPASS, ARTERIAL, FEMORAL TO FEMORAL, USING GRAFT - RIGHT TO LEFT;  Surgeon: Efren Fnug MD;  Location: [facility] OR;  Service: Cardiothoracic;  Laterality: N/A;    ENDARTERECTOMY OF FEMORAL ARTERY Right 3/27/2019    Procedure: ENDARTERECTOMY, FEMORAL;  Surgeon: Efren Fung MD;  Location: [facility] OR;  Service: Cardiothoracic;  Laterality: Right;    EXPLORATION OF FEMORAL ARTERY Left 6/27/2019    Procedure: EXPLORATION, ARTERY, FEMORAL with repair;  Surgeon: Naresh Randolph MD;  Location: [facility] OR;  Service: Cardiovascular;  Laterality: Left;    FINGER SURGERY      amputation right middle finger    PERCUTANEOUS TRANSLUMINAL ANGIOPLASTY (PTA) OF PERIPHERAL VESSEL N/A 1/9/2019    Procedure: PTA, PERIPHERAL VESSEL;  Surgeon: Alex Pittman MD;  Location: [facility] CATH;  Service: Cardiology;  Laterality: N/A;  administer lytic therapy overnight on Wednesday with then plans to touch up other vessels Thursday morning January 10,2019    PERCUTANEOUS TRANSLUMINAL ANGIOPLASTY (PTA) OF PERIPHERAL VESSEL N/A 6/27/2019    Procedure: PTA, PERIPHERAL VESSEL;  Surgeon: Alex Pittman MD;  Location: [facility] CATH;  Service: Cardiology;  Laterality: N/A;    PERCUTANEOUS TRANSLUMINAL ANGIOPLASTY (PTA) OF PERIPHERAL VESSEL N/A 1/30/2020    Procedure: PTA, PERIPHERAL VESSEL;  Surgeon: Alex Pittman MD;  Location: [facility] CATH;  Service: Cardiology;  Laterality: N/A;    REPLACEMENT OF IMPLANTABLE CARDIOVERTER-DEFIBRILLATOR (ICD) GENERATOR N/A 12/15/2021    Procedure: REPLACEMENT, ICD GENERATOR with possible upgrade to CRT;  Surgeon: Houston Pineda MD;  Location: [facility] CATH;  Service: Cardiology;  Laterality: N/A;    WOUND EXPLORATION Left 6/27/2019    Procedure: EXPLORATION, WOUND   Foreign body left femoral;  Surgeon: Naresh Randolph MD;  Location: [facility] OR;  Service: Cardiovascular;  Laterality: Left;     Family History   Problem Relation Age of Onset    Cancer Mother         bone    Heart attacks under age 50 Brother     Diabetes Daughter     Hypertension Father     Heart disease Father     Hyperlipidemia Father     Heart attacks under age 50 Paternal Grandfather      Social History     Tobacco Use    Smoking status: Every Day     Packs/day: 1.00     Years: 40.00     Pack years: 40.00     Types: Cigarettes     Start date: 1978    Smokeless tobacco: Never   Substance Use Topics    Alcohol use: Not Currently    Drug use: No     Review of Systems   Constitutional:  Negative for diaphoresis, fatigue and fever.   HENT:  Positive for dental problem. Negative for ear pain, hearing loss and tinnitus.    Eyes:  Negative for pain and redness.   Respiratory:  Negative for cough, shortness of breath and wheezing.    Cardiovascular:  Negative for chest pain.   Gastrointestinal:  Negative for abdominal pain, constipation, diarrhea, nausea and rectal pain.   Musculoskeletal:  Negative for back pain, neck pain and neck stiffness.   Neurological:  Negative for dizziness, seizures, numbness and headaches.   Psychiatric/Behavioral:  Negative for confusion, decreased concentration and dysphoric mood.    All other systems reviewed and are negative.    Physical Exam     Initial Vitals [05/04/23 0341]   BP Pulse Resp Temp SpO2   139/76 86 18 97.7 °F (36.5 °C) 100 %      MAP       --         Physical Exam    Nursing note and vitals reviewed.  Constitutional: Vital signs are normal. He appears well-developed and well-nourished. He is cooperative.   HENT:   Head: Normocephalic and atraumatic.   Right Ear: Hearing, tympanic membrane, external ear and ear canal normal.   Left Ear: Hearing, tympanic membrane, external ear and ear canal normal.   Nose: Nose normal.   Mouth/Throat: Uvula is midline and mucous membranes  are normal.   (+) bilateral cavities in the upper & lower molar areas with no facial swelling   Eyes: Conjunctivae, EOM and lids are normal. Pupils are equal, round, and reactive to light.   Neck: Neck supple. No JVD present.   Normal range of motion.   Full passive range of motion without pain.     Cardiovascular:  Normal rate, regular rhythm, S1 normal, S2 normal, normal heart sounds, intact distal pulses and normal pulses.           Pulmonary/Chest: Effort normal and breath sounds normal.   Abdominal: Abdomen is soft and flat. Bowel sounds are normal.   Musculoskeletal:         General: Normal range of motion.      Cervical back: Full passive range of motion without pain, normal range of motion and neck supple.     Neurological: He is alert and oriented to person, place, and time. He has normal strength.   Skin: Skin is warm, dry and intact. Capillary refill takes less than 2 seconds.       ED Course   Procedures  Labs Reviewed - No data to display       Imaging Results    None          Medications   ketorolac injection 60 mg (60 mg Intramuscular Given 23 0349)     Medical Decision Makin-year-old male with continued pain in his teeth with dental caries  No obvious signs of dental abscess on clinical evaluation today  No obvious signs of fever on ER evaluation today, no swelling now  5/10 pain with any chewing meals, IM Toradol given in the ER today  Medications prescribed on discharge, follow-up with a dentist in am  Patient counseled to return to the ER with any concerning symptoms                         Clinical Impression:   Final diagnoses:  [K08.9, G89.29] Chronic dental pain  [K04.01] Pulpitis (Primary)        ED Disposition Condition    Discharge Stable          ED Prescriptions       Medication Sig Dispense Start Date End Date Auth. Provider    ibuprofen (ADVIL,MOTRIN) 800 MG tablet Take 1 tablet (800 mg total) by mouth every 6 (six) hours as needed for Pain. 20 tablet 2023 -- Bernard ENGEL  MD Tony    amoxicillin (AMOXIL) 875 MG tablet Take 1 tablet (875 mg total) by mouth 2 (two) times daily. for 7 days 14 tablet 5/4/2023 5/11/2023 Bernard Jimenez MD    HYDROcodone-acetaminophen (NORCO)  mg per tablet Take 1 tablet by mouth every 6 (six) hours as needed (pain). 15 tablet 5/4/2023 5/8/2023 Bernard Jimenez MD          Follow-up Information       Follow up With Specialties Details Why Contact Info    [facility] Schedule an appointment as soon as possible for a visit in 2 days  [address] [address]  [phone]               Bernard Jimenez MD  05/04/23 0452

## 2023-05-12 DIAGNOSIS — Z76.0 MEDICATION REFILL: ICD-10-CM

## 2023-05-12 NOTE — TELEPHONE ENCOUNTER
Care Due:                  Date            Visit Type   Department     Provider  --------------------------------------------------------------------------------                                EP -                              PRIMARY      [facility]  Last Visit: 08-      [facility] ([facility]   MEDICINE II    Adela Gifford  Next Visit: None Scheduled  None         None Found                                                            Last  Test          Frequency    Reason                     Performed    Due Date  --------------------------------------------------------------------------------    HBA1C.......  6 months...  dulaglutide,               08- 02-                             empagliflozin, insulin...    [employer] Embedded Care Due Messages. Reference number: [id number].   5/12/2023 8:06:15 AM CDT

## 2023-05-16 RX ORDER — TADALAFIL 20 MG/1
TABLET ORAL
Qty: 20 TABLET | Refills: 11 | Status: SHIPPED | OUTPATIENT
Start: 2023-05-16 | End: 2024-02-15 | Stop reason: ALTCHOICE

## 2023-05-17 PROBLEM — Z79.4 TYPE 2 DIABETES MELLITUS, WITH LONG-TERM CURRENT USE OF INSULIN: Status: ACTIVE | Noted: 2023-05-17

## 2023-05-17 PROBLEM — E11.9 TYPE 2 DIABETES MELLITUS, WITH LONG-TERM CURRENT USE OF INSULIN: Status: ACTIVE | Noted: 2023-05-17

## 2023-05-17 PROBLEM — I47.29 NSVT (NONSUSTAINED VENTRICULAR TACHYCARDIA): Status: ACTIVE | Noted: 2023-05-17

## 2023-05-27 PROBLEM — I47.20 VENTRICULAR TACHYCARDIA: Status: ACTIVE | Noted: 2023-05-27

## 2023-06-02 ENCOUNTER — PATIENT OUTREACH (OUTPATIENT)
Dept: ADMINISTRATIVE | Facility: HOSPITAL | Age: 58
End: 2023-06-02
Payer: MEDICARE

## 2023-06-12 ENCOUNTER — HOSPITAL ENCOUNTER (EMERGENCY)
Facility: HOSPITAL | Age: 58
Discharge: HOME | End: 2023-06-12
Attending: SURGERY
Payer: MEDICARE

## 2023-06-12 VITALS
RESPIRATION RATE: 16 BRPM | SYSTOLIC BLOOD PRESSURE: 159 MMHG | HEART RATE: 82 BPM | OXYGEN SATURATION: 95 % | DIASTOLIC BLOOD PRESSURE: 67 MMHG | WEIGHT: 188.81 LBS | BODY MASS INDEX: 30.34 KG/M2 | HEIGHT: 66 IN | TEMPERATURE: 98 F

## 2023-06-12 DIAGNOSIS — K04.01 ACUTE PULPITIS: Primary | ICD-10-CM

## 2023-06-12 PROCEDURE — 63600175 PHARM REV CODE 636 W HCPCS: Performed by: SURGERY

## 2023-06-12 PROCEDURE — 99284 EMERGENCY DEPT VISIT MOD MDM: CPT

## 2023-06-12 PROCEDURE — 96372 THER/PROPH/DIAG INJ SC/IM: CPT | Performed by: SURGERY

## 2023-06-12 RX ORDER — AMOXICILLIN 875 MG/1
875 TABLET, FILM COATED ORAL 2 TIMES DAILY
Qty: 14 TABLET | Refills: 0 | Status: SHIPPED | OUTPATIENT
Start: 2023-06-12 | End: 2023-06-19

## 2023-06-12 RX ORDER — IBUPROFEN 800 MG/1
800 TABLET ORAL EVERY 6 HOURS PRN
Qty: 20 TABLET | Refills: 0 | Status: ON HOLD | OUTPATIENT
Start: 2023-06-12 | End: 2024-02-19 | Stop reason: HOSPADM

## 2023-06-12 RX ORDER — KETOROLAC TROMETHAMINE 30 MG/ML
60 INJECTION, SOLUTION INTRAMUSCULAR; INTRAVENOUS
Status: COMPLETED | OUTPATIENT
Start: 2023-06-12 | End: 2023-06-12

## 2023-06-12 RX ADMIN — KETOROLAC TROMETHAMINE 60 MG: 30 INJECTION, SOLUTION INTRAMUSCULAR at 05:06

## 2023-06-12 RX ADMIN — PENICILLIN G BENZATHINE 1.2 MILLION UNITS: 1200000 INJECTION, SUSPENSION INTRAMUSCULAR at 05:06

## 2023-06-12 NOTE — ED TRIAGE NOTES
Patient arrived to ED with c/o upper and lower tooth infections starting about 3 days ago and is concerned he may need antibiotics. NADN in triage. Rates pain 10/10

## 2023-06-12 NOTE — ED PROVIDER NOTES
Encounter Date: 6/12/2023       History     Chief Complaint   Patient presents with    Dental Pain     Patient arrived to ED with c/o upper and lower tooth infections starting about 3 days ago and is concerned he may need antibiotics. KIMBERLY in triage. Rates pain 10/10     Miles Currie is a 57 y.o. male that presents with dental pain today  Patient with longstanding issues with dental pain, poor diabetes control  Patient has left upper & lower molar cavities with no obvious abscess  Patient is requesting a referral to the [facility] for evaluation  Patient states he can not afford local Dentistry due to financial issues  Five/dental pain with any chewing meals over last several weeks now    Review of patient's allergies indicates:   Allergen Reactions    Fish oil Swelling    Gabapentin Swelling     Past Medical History:   Diagnosis Date    Anticoagulant long-term use     plavix and  pletal    Anxiety and depression     Bipolar disorder     Patient denies    CHF (congestive heart failure)     Coronary artery disease     Diabetes mellitus     Dyslipidemia     Encounter for blood transfusion     GERD (gastroesophageal reflux disease)     History of claustrophobia     IF SOMEONE TOUCHES HIS NOSE    Hypertension     Irregular heartbeat     Ischemic cardiomyopathy     MI (myocardial infarction) 2009    multiple MI's    Multiple gastric ulcers     Pacemaker     PAD (peripheral artery disease)     Presence of automatic implantable cardioverter-defibrillator     PVD (peripheral vascular disease)     Sleep apnea     does not have machine      Past Surgical History:   Procedure Laterality Date    ANGIOPLASTY Bilateral     right  and left iliac stents    AORTA - BILATERAL FEMORAL ARTERY BYPASS GRAFT  07/09/2013    APPENDECTOMY      ARTERIAL THROMBECTOMY Right 02/16/2012    iliac artery    ARTERIAL THROMBECTOMY Left 02/23/2012    brachial artery    CARDIAC DEFIBRILLATOR PLACEMENT Right 10/23/2012    [employer]tr[employer]    CORONARY  ANGIOPLASTY WITH STENT PLACEMENT      CORONARY ARTERY BYPASS GRAFT  2011    x3    CREATION OF FEMORAL-FEMORAL ARTERY BYPASS WITH GRAFT N/A 3/27/2019    Procedure: CREATION, BYPASS, ARTERIAL, FEMORAL TO FEMORAL, USING GRAFT - RIGHT TO LEFT;  Surgeon: Efren Fung MD;  Location: [facility] OR;  Service: Cardiothoracic;  Laterality: N/A;    ENDARTERECTOMY OF FEMORAL ARTERY Right 3/27/2019    Procedure: ENDARTERECTOMY, FEMORAL;  Surgeon: Efren Fung MD;  Location: [facility] OR;  Service: Cardiothoracic;  Laterality: Right;    EXPLORATION OF FEMORAL ARTERY Left 6/27/2019    Procedure: EXPLORATION, ARTERY, FEMORAL with repair;  Surgeon: Naresh Randolph MD;  Location: [facility] OR;  Service: Cardiovascular;  Laterality: Left;    FINGER SURGERY      amputation right middle finger    LEFT HEART CATHETERIZATION N/A 5/17/2023    Procedure: Left heart cath;  Surgeon: Ezequiel Peña MD;  Location: [facility] CATH;  Service: Cardiology;  Laterality: N/A;    PERCUTANEOUS TRANSLUMINAL ANGIOPLASTY (PTA) OF PERIPHERAL VESSEL N/A 1/9/2019    Procedure: PTA, PERIPHERAL VESSEL;  Surgeon: Alex Pittman MD;  Location: [facility] CATH;  Service: Cardiology;  Laterality: N/A;  administer lytic therapy overnight on Wednesday with then plans to touch up other vessels Thursday morning January 10,2019    PERCUTANEOUS TRANSLUMINAL ANGIOPLASTY (PTA) OF PERIPHERAL VESSEL N/A 6/27/2019    Procedure: PTA, PERIPHERAL VESSEL;  Surgeon: Alex Pittman MD;  Location: [facility] CATH;  Service: Cardiology;  Laterality: N/A;    PERCUTANEOUS TRANSLUMINAL ANGIOPLASTY (PTA) OF PERIPHERAL VESSEL N/A 1/30/2020    Procedure: PTA, PERIPHERAL VESSEL;  Surgeon: Alex Pittman MD;  Location: [facility] CATH;  Service: Cardiology;  Laterality: N/A;    REPLACEMENT OF IMPLANTABLE CARDIOVERTER-DEFIBRILLATOR (ICD) GENERATOR N/A 12/15/2021    Procedure: REPLACEMENT, ICD GENERATOR with possible upgrade to CRT;  Surgeon: Houston Pineda MD;  Location: [facility] CATH;  Service: Cardiology;   Laterality: N/A;    WOUND EXPLORATION Left 6/27/2019    Procedure: EXPLORATION, WOUND  Foreign body left femoral;  Surgeon: Naresh Randolph MD;  Location: [facility] OR;  Service: Cardiovascular;  Laterality: Left;     Family History   Problem Relation Age of Onset    Cancer Mother         bone    Heart attacks under age 50 Brother     Diabetes Daughter     Hypertension Father     Heart disease Father     Hyperlipidemia Father     Heart attacks under age 50 Paternal Grandfather      Social History     Tobacco Use    Smoking status: Every Day     Packs/day: 1.00     Years: 40.00     Pack years: 40.00     Types: Cigarettes     Start date: 1978    Smokeless tobacco: Never   Substance Use Topics    Alcohol use: Not Currently    Drug use: No     Review of Systems   Constitutional:  Negative for diaphoresis, fatigue and fever.   HENT:  Positive for dental problem. Negative for ear pain, hearing loss and tinnitus.    Eyes:  Negative for pain and redness.   Respiratory:  Negative for cough, shortness of breath and wheezing.    Cardiovascular:  Negative for chest pain.   Gastrointestinal:  Negative for abdominal pain, constipation, diarrhea, nausea and rectal pain.   Musculoskeletal:  Negative for back pain, neck pain and neck stiffness.   Neurological:  Negative for dizziness, seizures, numbness and headaches.   Psychiatric/Behavioral:  Negative for confusion, decreased concentration and dysphoric mood.    All other systems reviewed and are negative.    Physical Exam     Initial Vitals [06/12/23 0500]   BP Pulse Resp Temp SpO2   136/60 61 20 97.8 °F (36.6 °C) 96 %      MAP       --         Physical Exam    Nursing note and vitals reviewed.  Constitutional: Vital signs are normal. He appears well-developed and well-nourished. He is cooperative.   HENT:   Head: Normocephalic and atraumatic.   Right Ear: Hearing, tympanic membrane, external ear and ear canal normal.   Left Ear: Hearing, tympanic membrane, external ear and ear canal  normal.   Nose: Nose normal.   Mouth/Throat: Uvula is midline and mucous membranes are normal.   (+) bilateral cavities in the upper & lower molar areas with no facial swelling   Eyes: Conjunctivae, EOM and lids are normal. Pupils are equal, round, and reactive to light.   Neck: Neck supple. No JVD present.   Normal range of motion.   Full passive range of motion without pain.     Cardiovascular:  Normal rate, regular rhythm, S1 normal, S2 normal, normal heart sounds, intact distal pulses and normal pulses.           Pulmonary/Chest: Effort normal and breath sounds normal.   Abdominal: Abdomen is soft and flat. Bowel sounds are normal.   Musculoskeletal:         General: Normal range of motion.      Cervical back: Full passive range of motion without pain, normal range of motion and neck supple.     Neurological: He is alert and oriented to person, place, and time. He has normal strength.   Skin: Skin is warm, dry and intact. Capillary refill takes less than 2 seconds.       ED Course   Procedures  Labs Reviewed - No data to display       Imaging Results    None          Medications   ketorolac injection 60 mg (has no administration in time range)   penicillin G benzathine (BICILLIN LA) injection 1.2 Million Units (has no administration in time range)     Medical Decision Makin-year-old male with dental pain for last several weeks  States the pain is worse last weak, poorly-controlled diabetic  Differential: Dentalgia, dental abscess, chronic dental pain  IM Bicillin given in the emergency room with Toradol injection  Amoxicillin & Motrin prescribed on ER discharge this morning  Follow-up with [facility] dental referral made today    This patient does not need to be hospitalized on ER evaluation today  Patient has limited financial resources for dental care based on history  Does not require surgery or procedure (major or minor), no risk factors  Patient counseled to return to the ER with any  concerning symptoms                           Clinical Impression:   Final diagnoses:  [K04.01] Acute pulpitis (Primary)        ED Disposition Condition    Discharge Stable          ED Prescriptions       Medication Sig Dispense Start Date End Date Auth. Provider    amoxicillin (AMOXIL) 875 MG tablet Take 1 tablet (875 mg total) by mouth 2 (two) times daily. for 7 days 14 tablet 6/12/2023 6/19/2023 Bernard Jimenez MD    ibuprofen (ADVIL,MOTRIN) 800 MG tablet Take 1 tablet (800 mg total) by mouth every 6 (six) hours as needed for Pain. 20 tablet 6/12/2023 -- Bernard Jimenez MD          Follow-up Information       Follow up With Specialties Details Why Contact Info    [facility] Schedule an appointment as soon as possible for a visit in 2 days  [address] [facility] [id number] [phone]      [facility] Schedule an appointment as soon as possible for a visit in 2 days  [address] [address]  [phone]               Bernard Jimenez MD  06/12/23 0524

## 2023-06-13 ENCOUNTER — PATIENT OUTREACH (OUTPATIENT)
Dept: EMERGENCY MEDICINE | Facility: HOSPITAL | Age: 58
End: 2023-06-13
Payer: MEDICARE

## 2023-06-13 NOTE — PROGRESS NOTES
Pt declined assistance with appointments, and will contact on his own.    Shreya Pablo  [phone]

## 2023-07-12 ENCOUNTER — PES CALL (OUTPATIENT)
Dept: ADMINISTRATIVE | Facility: CLINIC | Age: 58
End: 2023-07-12
Payer: MEDICARE

## 2023-07-25 ENCOUNTER — PATIENT OUTREACH (OUTPATIENT)
Dept: ADMINISTRATIVE | Facility: HOSPITAL | Age: 58
End: 2023-07-25
Payer: MEDICARE

## 2023-07-25 LAB — EGFR: 48.2

## 2023-09-25 ENCOUNTER — PATIENT OUTREACH (OUTPATIENT)
Dept: ADMINISTRATIVE | Facility: HOSPITAL | Age: 58
End: 2023-09-25
Payer: MEDICARE

## 2023-09-25 DIAGNOSIS — E11.65 UNCONTROLLED TYPE 2 DIABETES MELLITUS WITH HYPERGLYCEMIA: Primary | ICD-10-CM

## 2023-09-25 NOTE — PROGRESS NOTES
Chart reviewed, immunization record updated.  No new results noted on [facility] or [facility] web site.  Care Everywhere updated.   Patient care coordination note updated.   LOV with PCP 08/31/2022.  Contacted patient to discuss Colorectal cancer screening, Diabetic Eye screening, Statin therapy and scheduling Lab appointment for Diabetic labs.  Microalbumin, HgbA1c and Lipid panel order placed.   Fax sent to [facility] for last lab work completed.   Patient will schedule Lab appointment once labs received from [facility].   Patient opted for FIT KIT for colorectal screening.  FitKit was given to patient on 9/25/2023 11:53 AM   Diabetic Eye screening scheduled for 10/24/2023.  Review for statin therapy, reminder placed on appointment note for provider for next PCP.

## 2023-09-25 NOTE — LETTER
AUTHORIZATION FOR RELEASE OF   CONFIDENTIAL INFORMATION    Dear [facility],    We are seeing Miles Currie, date of birth 1965, in the clinic at [facility]. Adela Gfiford MD is the patient's PCP. Miles Currie has an outstanding lab/procedure at the time we reviewed his chart. In order to help keep his health information updated, he has authorized us to request the following medical record(s):                                                ( X )  OUTSIDE LAB RESULTS            Please fax records to [facility], Knight, Sarah, MD, [phone].    If you have any questions, please contact...  Falguni Zimmer LPN  Clinical Care Coordinator  [facility]  Phone: [phone]  Fax: [phone]            Patient Name: Miles Currie  : 1965  Patient Phone #: [phone]

## 2023-10-02 NOTE — PROGRESS NOTES
Received external CMP, CBC, BNP collected/ completed on 07/25/2023, updated to [place].   Received external eGFR collected/ completed on 07/25/2023, updated to .

## 2023-10-09 NOTE — TELEPHONE ENCOUNTER
No care due was identified.  [facility] Embedded Care Due Messages. Reference number: [id number].   10/09/2023 12:13:48 PM CDT

## 2023-10-10 RX ORDER — PEN NEEDLE, DIABETIC 30 GX3/16"
NEEDLE, DISPOSABLE MISCELLANEOUS
Qty: 100 EACH | Refills: 1 | Status: SHIPPED | OUTPATIENT
Start: 2023-10-10

## 2023-10-15 ENCOUNTER — HOSPITAL ENCOUNTER (EMERGENCY)
Facility: HOSPITAL | Age: 58
Discharge: HOME | End: 2023-10-15
Attending: EMERGENCY MEDICINE
Payer: MEDICARE

## 2023-10-15 VITALS
DIASTOLIC BLOOD PRESSURE: 57 MMHG | BODY MASS INDEX: 29.5 KG/M2 | SYSTOLIC BLOOD PRESSURE: 116 MMHG | RESPIRATION RATE: 20 BRPM | TEMPERATURE: 98 F | WEIGHT: 182.75 LBS | HEART RATE: 81 BPM

## 2023-10-15 DIAGNOSIS — R51.9 FACIAL PAIN: Primary | ICD-10-CM

## 2023-10-15 DIAGNOSIS — K04.7 CHRONIC DENTAL INFECTION: ICD-10-CM

## 2023-10-15 PROCEDURE — 25000003 PHARM REV CODE 250: Mod: HCNC

## 2023-10-15 PROCEDURE — 99283 EMERGENCY DEPT VISIT LOW MDM: CPT | Mod: HCNC

## 2023-10-15 RX ORDER — CLINDAMYCIN HYDROCHLORIDE 150 MG/1
300 CAPSULE ORAL
Status: COMPLETED | OUTPATIENT
Start: 2023-10-15 | End: 2023-10-15

## 2023-10-15 RX ORDER — CLINDAMYCIN HYDROCHLORIDE 300 MG/1
300 CAPSULE ORAL EVERY 6 HOURS
Qty: 28 CAPSULE | Refills: 0 | Status: SHIPPED | OUTPATIENT
Start: 2023-10-15 | End: 2023-10-22

## 2023-10-15 RX ORDER — OXYCODONE AND ACETAMINOPHEN 5; 325 MG/1; MG/1
2 TABLET ORAL
Status: COMPLETED | OUTPATIENT
Start: 2023-10-15 | End: 2023-10-15

## 2023-10-15 RX ADMIN — OXYCODONE HYDROCHLORIDE AND ACETAMINOPHEN 2 TABLET: 5; 325 TABLET ORAL at 05:10

## 2023-10-15 RX ADMIN — CLINDAMYCIN HYDROCHLORIDE 300 MG: 150 CAPSULE ORAL at 05:10

## 2023-10-15 NOTE — ED TRIAGE NOTES
57 y.o. male presents to ER Room/bed info not found   Chief Complaint   Patient presents with    Facial Pain   .   C/o facial pain and swelling since Wednesday

## 2023-10-15 NOTE — ED PROVIDER NOTES
Encounter Date: 10/15/2023       History     Chief Complaint   Patient presents with    Facial Pain     This note is dictated on M*Modal word recognition program.  There are word recognition mistakes and grammatical errors that are occasionally missed on review.     Miles Currie is a 57 y.o. male presents to ER today with complaints of bilateral facial swelling, irritation of bilateral nares, and poor dental health.  Patient reports of facial pain and swelling since this past Wednesday.  Patient reports he has several decayed, fracture teeth in his mouth.  Patient also reports he has been irritation of the opening of bilateral nares.  Patient reports facial swelling been present however has subsided somewhat today.  Patient denies any fevers.    The history is provided by the patient.     Review of patient's allergies indicates:   Allergen Reactions    Fish oil Swelling    Gabapentin Swelling     Past Medical History:   Diagnosis Date    Anticoagulant long-term use     plavix and  pletal    Anxiety and depression     Bipolar disorder     Patient denies    CHF (congestive heart failure)     Coronary artery disease     Diabetes mellitus     Dyslipidemia     Encounter for blood transfusion     GERD (gastroesophageal reflux disease)     History of claustrophobia     IF SOMEONE TOUCHES HIS NOSE    Hypertension     Irregular heartbeat     Ischemic cardiomyopathy     MI (myocardial infarction) 2009    multiple MI's    Multiple gastric ulcers     Pacemaker     PAD (peripheral artery disease)     Presence of automatic implantable cardioverter-defibrillator     PVD (peripheral vascular disease)     Sleep apnea     does not have machine      Past Surgical History:   Procedure Laterality Date    ABLATION N/A 8/2/2023    Procedure: Ablation;  Surgeon: Ge Benedict MD;  Location: [facility] CATH;  Service: Cardiology;  Laterality: N/A;  *ICD*    ANGIOPLASTY Bilateral     right  and left iliac stents    AORTA - BILATERAL FEMORAL  ARTERY BYPASS GRAFT  07/09/2013    APPENDECTOMY      ARTERIAL THROMBECTOMY Right 02/16/2012    iliac artery    ARTERIAL THROMBECTOMY Left 02/23/2012    brachial artery    CARDIAC DEFIBRILLATOR PLACEMENT Right 10/23/2012    [employer]    CORONARY ANGIOPLASTY WITH STENT PLACEMENT      CORONARY ARTERY BYPASS GRAFT  2011    x3    CREATION OF FEMORAL-FEMORAL ARTERY BYPASS WITH GRAFT N/A 3/27/2019    Procedure: CREATION, BYPASS, ARTERIAL, FEMORAL TO FEMORAL, USING GRAFT - RIGHT TO LEFT;  Surgeon: Efren Fung MD;  Location: [facility] OR;  Service: Cardiothoracic;  Laterality: N/A;    ENDARTERECTOMY OF FEMORAL ARTERY Right 3/27/2019    Procedure: ENDARTERECTOMY, FEMORAL;  Surgeon: Efren Fung MD;  Location: [facility] OR;  Service: Cardiothoracic;  Laterality: Right;    EXPLORATION OF FEMORAL ARTERY Left 6/27/2019    Procedure: EXPLORATION, ARTERY, FEMORAL with repair;  Surgeon: Naresh Randolph MD;  Location: [facility] OR;  Service: Cardiovascular;  Laterality: Left;    FINGER SURGERY      amputation right middle finger    LEFT HEART CATHETERIZATION N/A 5/17/2023    Procedure: Left heart cath;  Surgeon: zEequiel Peña MD;  Location: [facility] CATH;  Service: Cardiology;  Laterality: N/A;    PERCUTANEOUS TRANSLUMINAL ANGIOPLASTY (PTA) OF PERIPHERAL VESSEL N/A 1/9/2019    Procedure: PTA, PERIPHERAL VESSEL;  Surgeon: Alex Pittman MD;  Location: [facility] CATH;  Service: Cardiology;  Laterality: N/A;  administer lytic therapy overnight on Wednesday with then plans to touch up other vessels Thursday morning January 10,2019    PERCUTANEOUS TRANSLUMINAL ANGIOPLASTY (PTA) OF PERIPHERAL VESSEL N/A 6/27/2019    Procedure: PTA, PERIPHERAL VESSEL;  Surgeon: Alex Pittman MD;  Location: [facility] CATH;  Service: Cardiology;  Laterality: N/A;    PERCUTANEOUS TRANSLUMINAL ANGIOPLASTY (PTA) OF PERIPHERAL VESSEL N/A 1/30/2020    Procedure: PTA, PERIPHERAL VESSEL;  Surgeon: Alex Pittman MD;  Location: [facility] CATH;  Service: Cardiology;  Laterality:  N/A;    REPLACEMENT OF IMPLANTABLE CARDIOVERTER-DEFIBRILLATOR (ICD) GENERATOR N/A 12/15/2021    Procedure: REPLACEMENT, ICD GENERATOR with possible upgrade to CRT;  Surgeon: Houston Pineda MD;  Location: [facility] CATH;  Service: Cardiology;  Laterality: N/A;    WOUND EXPLORATION Left 6/27/2019    Procedure: EXPLORATION, WOUND  Foreign body left femoral;  Surgeon: Naresh Randolph MD;  Location: [facility] OR;  Service: Cardiovascular;  Laterality: Left;     Family History   Problem Relation Age of Onset    Cancer Mother         bone    Heart attacks under age 50 Brother     Diabetes Daughter     Hypertension Father     Heart disease Father     Hyperlipidemia Father     Heart attacks under age 50 Paternal Grandfather      Social History     Tobacco Use    Smoking status: Every Day     Current packs/day: 1.00     Average packs/day: 1 pack/day for 45.8 years (45.8 ttl pk-yrs)     Types: Cigarettes     Start date: 1978    Smokeless tobacco: Never   Substance Use Topics    Alcohol use: Not Currently    Drug use: No     Review of Systems   Constitutional: Negative.    HENT:  Positive for dental problem and facial swelling.    Eyes: Negative.    Respiratory: Negative.     Cardiovascular: Negative.    Gastrointestinal: Negative.    Endocrine: Negative.    Genitourinary: Negative.    Musculoskeletal: Negative.    Skin: Negative.    Allergic/Immunologic: Negative.    Neurological: Negative.    Hematological: Negative.    Psychiatric/Behavioral: Negative.         Physical Exam     Initial Vitals [10/15/23 1659]   BP Pulse Resp Temp SpO2   (!) 116/57 81 20 97.6 °F (36.4 °C) --      MAP       --         Physical Exam    Constitutional: He appears well-developed and well-nourished. He is not diaphoretic. No distress.   HENT:   Right Ear: External ear normal.   Left Ear: External ear normal.   Patient has several fracture, decayed, eroded teeth in his mouth.  There is also some irritation noted to opening of bilateral nares.   Eyes:  Pupils are equal, round, and reactive to light. Right eye exhibits no discharge. Left eye exhibits no discharge.   Neck: Neck supple.   Normal range of motion.  Cardiovascular:  Normal rate and regular rhythm.           Pulmonary/Chest: Breath sounds normal. No respiratory distress. He has no wheezes. He has no rhonchi. He has no rales. He exhibits no tenderness.   Abdominal: Abdomen is soft.   Musculoskeletal:         General: No tenderness or edema.      Cervical back: Normal range of motion and neck supple.     Neurological: He is alert and oriented to person, place, and time. GCS score is 15. GCS eye subscore is 4. GCS verbal subscore is 5. GCS motor subscore is 6.   Skin: Capillary refill takes less than 2 seconds. No rash and no abscess noted. No erythema. No pallor.   Psychiatric: He has a normal mood and affect.         ED Course   Procedures  Labs Reviewed - No data to display       Imaging Results    None          Medications   clindamycin capsule 300 mg (300 mg Oral Given 10/15/23 1716)   oxyCODONE-acetaminophen 5-325 mg per tablet 2 tablet (2 tablets Oral Given 10/15/23 1716)     Medical Decision Making  Differential diagnosis includes dental infection, dental caries, dental abscess, cellulitis of face    Will place patient on clindamycin to treat both possible facial cellulitis, and dental infection.    Will treat patient's pain with Percocet.    Patient instructed to follow-up dentist and PCP.    Patient stable at time of discharge in no acute distress.  No life-threatening illnesses were found during ER visit today.  Patient was instructed to follow-up with PCP or other recommended specialist within the next 48-72 hours.  Patient was instructed to return to ER immediately for any worsening or concerning symptoms.  All discharge instructions discussed with patient, and patient agrees to comply with discharge instructions given today.     Risk  Prescription drug management.                                Clinical Impression:   Final diagnoses:  [R51.9] Facial pain (Primary)  [K04.7] Chronic dental infection        ED Disposition Condition    Discharge Stable          ED Prescriptions       Medication Sig Dispense Start Date End Date Auth. Provider    clindamycin (CLEOCIN) 300 MG capsule Take 1 capsule (300 mg total) by mouth every 6 (six) hours. for 7 days 28 capsule 10/15/2023 10/22/2023 Bernard Braden NP          Follow-up Information       Follow up With Specialties Details Why Contact Info    Adela Gifford MD Internal Medicine Schedule an appointment as soon as possible for a visit in 3 days  [address] [address]  [phone]      Dental, [facility] Schedule an appointment as soon as possible for a visit in 1 week  [address] [address]  [phone]               Bernard Braden NP  10/15/23 7986

## 2023-10-16 ENCOUNTER — PATIENT OUTREACH (OUTPATIENT)
Dept: EMERGENCY MEDICINE | Facility: HOSPITAL | Age: 58
End: 2023-10-16
Payer: MEDICARE

## 2023-10-17 NOTE — PROGRESS NOTES
Unable to reach pt after 2 attempts. Encounter will be closed.    Shreya Pablo  ED Navigator  [phone]

## 2023-10-24 ENCOUNTER — OFFICE VISIT (OUTPATIENT)
Dept: INTERNAL MEDICINE | Facility: CLINIC | Age: 58
End: 2023-10-24
Payer: MEDICARE

## 2023-10-24 ENCOUNTER — CLINICAL SUPPORT (OUTPATIENT)
Dept: INTERNAL MEDICINE | Facility: CLINIC | Age: 58
End: 2023-10-24
Attending: INTERNAL MEDICINE
Payer: MEDICARE

## 2023-10-24 VITALS
OXYGEN SATURATION: 97 % | HEART RATE: 63 BPM | HEIGHT: 66 IN | RESPIRATION RATE: 19 BRPM | BODY MASS INDEX: 30.08 KG/M2 | DIASTOLIC BLOOD PRESSURE: 60 MMHG | WEIGHT: 187.19 LBS | SYSTOLIC BLOOD PRESSURE: 90 MMHG

## 2023-10-24 DIAGNOSIS — Z95.810 AUTOMATIC IMPLANTABLE CARDIAC DEFIBRILLATOR IN SITU: ICD-10-CM

## 2023-10-24 DIAGNOSIS — I73.9 PVD (PERIPHERAL VASCULAR DISEASE) WITH CLAUDICATION: ICD-10-CM

## 2023-10-24 DIAGNOSIS — I47.29 NSVT (NONSUSTAINED VENTRICULAR TACHYCARDIA): ICD-10-CM

## 2023-10-24 DIAGNOSIS — Z72.0 TOBACCO USE: ICD-10-CM

## 2023-10-24 DIAGNOSIS — E11.65 TYPE 2 DIABETES MELLITUS WITH HYPERGLYCEMIA, WITH LONG-TERM CURRENT USE OF INSULIN: Primary | ICD-10-CM

## 2023-10-24 DIAGNOSIS — Z79.4 TYPE 2 DIABETES MELLITUS WITH HYPERGLYCEMIA, WITH LONG-TERM CURRENT USE OF INSULIN: Primary | ICD-10-CM

## 2023-10-24 DIAGNOSIS — E11.65 UNCONTROLLED TYPE 2 DIABETES MELLITUS WITH HYPERGLYCEMIA: ICD-10-CM

## 2023-10-24 DIAGNOSIS — E78.5 HYPERLIPIDEMIA, UNSPECIFIED HYPERLIPIDEMIA TYPE: ICD-10-CM

## 2023-10-24 DIAGNOSIS — N52.9 ERECTILE DYSFUNCTION, UNSPECIFIED ERECTILE DYSFUNCTION TYPE: ICD-10-CM

## 2023-10-24 DIAGNOSIS — F31.9 BIPOLAR AFFECTIVE DISORDER, REMISSION STATUS UNSPECIFIED: ICD-10-CM

## 2023-10-24 DIAGNOSIS — I25.119 CORONARY ARTERY DISEASE INVOLVING NATIVE CORONARY ARTERY OF NATIVE HEART WITH ANGINA PECTORIS: ICD-10-CM

## 2023-10-24 DIAGNOSIS — I25.5 ISCHEMIC CARDIOMYOPATHY: ICD-10-CM

## 2023-10-24 PROCEDURE — 3078F PR MOST RECENT DIASTOLIC BLOOD PRESSURE < 80 MM HG: ICD-10-PCS | Mod: HCNC,CPTII,S$GLB, | Performed by: INTERNAL MEDICINE

## 2023-10-24 PROCEDURE — 99215 OFFICE O/P EST HI 40 MIN: CPT | Mod: HCNC,S$GLB,, | Performed by: INTERNAL MEDICINE

## 2023-10-24 PROCEDURE — 1160F PR REVIEW ALL MEDS BY PRESCRIBER/CLIN PHARMACIST DOCUMENTED: ICD-10-PCS | Mod: HCNC,CPTII,S$GLB, | Performed by: INTERNAL MEDICINE

## 2023-10-24 PROCEDURE — 2025F DIABETIC EYE SCREENING PHOTO: ICD-10-PCS | Mod: HCNC,CPTII,S$GLB, | Performed by: OPTOMETRIST

## 2023-10-24 PROCEDURE — 92228 DIABETIC EYE SCREENING PHOTO: ICD-10-PCS | Mod: 26,HCNC,S$GLB, | Performed by: OPTOMETRIST

## 2023-10-24 PROCEDURE — 1159F PR MEDICATION LIST DOCUMENTED IN MEDICAL RECORD: ICD-10-PCS | Mod: HCNC,CPTII,S$GLB, | Performed by: INTERNAL MEDICINE

## 2023-10-24 PROCEDURE — 92228 IMG RTA DETC/MNTR DS PHY/QHP: CPT | Mod: HCNC,TC,S$GLB, | Performed by: INTERNAL MEDICINE

## 2023-10-24 PROCEDURE — 99999 PR PBB SHADOW E&M-EST. PATIENT-LVL IV: CPT | Mod: PBBFAC,HCNC,, | Performed by: INTERNAL MEDICINE

## 2023-10-24 PROCEDURE — 92228 IMG RTA DETC/MNTR DS PHY/QHP: CPT | Mod: 26,HCNC,S$GLB, | Performed by: OPTOMETRIST

## 2023-10-24 PROCEDURE — 3074F PR MOST RECENT SYSTOLIC BLOOD PRESSURE < 130 MM HG: ICD-10-PCS | Mod: HCNC,CPTII,S$GLB, | Performed by: INTERNAL MEDICINE

## 2023-10-24 PROCEDURE — 4010F ACE/ARB THERAPY RXD/TAKEN: CPT | Mod: HCNC,CPTII,S$GLB, | Performed by: INTERNAL MEDICINE

## 2023-10-24 PROCEDURE — 3008F BODY MASS INDEX DOCD: CPT | Mod: HCNC,CPTII,S$GLB, | Performed by: INTERNAL MEDICINE

## 2023-10-24 PROCEDURE — 3046F HEMOGLOBIN A1C LEVEL >9.0%: CPT | Mod: HCNC,CPTII,S$GLB, | Performed by: INTERNAL MEDICINE

## 2023-10-24 PROCEDURE — 1160F RVW MEDS BY RX/DR IN RCRD: CPT | Mod: HCNC,CPTII,S$GLB, | Performed by: INTERNAL MEDICINE

## 2023-10-24 PROCEDURE — 3046F PR MOST RECENT HEMOGLOBIN A1C LEVEL > 9.0%: ICD-10-PCS | Mod: HCNC,CPTII,S$GLB, | Performed by: INTERNAL MEDICINE

## 2023-10-24 PROCEDURE — 92228 DIABETIC EYE SCREENING PHOTO: ICD-10-PCS | Mod: HCNC,TC,S$GLB, | Performed by: INTERNAL MEDICINE

## 2023-10-24 PROCEDURE — 99215 PR OFFICE/OUTPT VISIT, EST, LEVL V, 40-54 MIN: ICD-10-PCS | Mod: HCNC,S$GLB,, | Performed by: INTERNAL MEDICINE

## 2023-10-24 PROCEDURE — 3078F DIAST BP <80 MM HG: CPT | Mod: HCNC,CPTII,S$GLB, | Performed by: INTERNAL MEDICINE

## 2023-10-24 PROCEDURE — 2025F 7 FLD RTA PHOTO W/O RTNOPTHY: CPT | Mod: HCNC,CPTII,S$GLB, | Performed by: OPTOMETRIST

## 2023-10-24 PROCEDURE — 99999 PR PBB SHADOW E&M-EST. PATIENT-LVL IV: ICD-10-PCS | Mod: PBBFAC,HCNC,, | Performed by: INTERNAL MEDICINE

## 2023-10-24 PROCEDURE — 3074F SYST BP LT 130 MM HG: CPT | Mod: HCNC,CPTII,S$GLB, | Performed by: INTERNAL MEDICINE

## 2023-10-24 PROCEDURE — 4010F PR ACE/ARB THEARPY RXD/TAKEN: ICD-10-PCS | Mod: HCNC,CPTII,S$GLB, | Performed by: INTERNAL MEDICINE

## 2023-10-24 PROCEDURE — 3008F PR BODY MASS INDEX (BMI) DOCUMENTED: ICD-10-PCS | Mod: HCNC,CPTII,S$GLB, | Performed by: INTERNAL MEDICINE

## 2023-10-24 PROCEDURE — 1159F MED LIST DOCD IN RCRD: CPT | Mod: HCNC,CPTII,S$GLB, | Performed by: INTERNAL MEDICINE

## 2023-10-24 RX ORDER — DULAGLUTIDE 3 MG/.5ML
3 INJECTION, SOLUTION SUBCUTANEOUS
Qty: 4 PEN | Refills: 11 | Status: SHIPPED | OUTPATIENT
Start: 2023-10-24 | End: 2024-10-23

## 2023-10-24 NOTE — PROGRESS NOTES
"Subjective:       Patient ID: Miles Currie is a 58 y.o. male.    Chief Complaint: Annual Exam      HPI:    Patient is known to me and presents for follow up DM, PVD, CHF, NSVT, tobacco abuse and GERD. Patient has not had follow up with me in 1 year.     Since I saw him last he was admitted for VT. He was having recurrent syncope and admitted for management. Underwent PCI of LAD and circumflex. Initially started o PO amiodarone but continued to have syncope and VT. He was then admitted again July 2023 for VT ablation. Amiodarone stopped after VT ablation. ICD remains in place; no further episodes per patient report.       Diabetes: improving since his last admission. He stopped drinking regular Dr. Pepper and drinking sugar free. On Lantus 40 units BID, Trulicity 3mg weekly and Jardiance. Off metformin and Novolog premeal.   Reports home glucose 110-140s on average.      CAD/PVD: on repatha and zetia and fenofibrate for HLD control. On Plavix and Eliquis for anticoagulation. On BB but metoprolol and coreg both on list so need to determine from cardiology which he should be taking.   neuropathy/PAD. Failed Gabapentin, cymbalta. States the Cialis helps his pian in the legs "feels like it opens everything up"    CHF: on entresto, torsemide and aldactone. No signs of volume overload today.     He is also c/o ED symptoms. meds are not effective. Has significant PVD. on cialis 20mg and tried a friend's viagra without improvement in sx.    Bipolar: diagnosed by outside physician. On wellbutrin per outside provider and trazodone 50mg at night.    Tobacco use: he is ready to quit smoking. Had some success with patches in the past. Agreeable to smoking cessation clinic.     GERD: well controlled with protonix.            Past Medical History:   Diagnosis Date    Anticoagulant long-term use     plavix and  pletal    Anxiety and depression     Bipolar disorder     Patient denies    CHF (congestive heart failure)     Coronary " artery disease     Diabetes mellitus     Dyslipidemia     Encounter for blood transfusion     GERD (gastroesophageal reflux disease)     History of claustrophobia     IF SOMEONE TOUCHES HIS NOSE    Hypertension     Irregular heartbeat     Ischemic cardiomyopathy     MI (myocardial infarction) 2009    multiple MI's    Multiple gastric ulcers     Pacemaker     PAD (peripheral artery disease)     Presence of automatic implantable cardioverter-defibrillator     PVD (peripheral vascular disease)     Sleep apnea     does not have machine        Family History   Problem Relation Age of Onset    Cancer Mother         bone    Heart attacks under age 50 Brother     Diabetes Daughter     Hypertension Father     Heart disease Father     Hyperlipidemia Father     Heart attacks under age 50 Paternal Grandfather        Social History     Socioeconomic History    Marital status: Legally    Tobacco Use    Smoking status: Every Day     Current packs/day: 1.00     Average packs/day: 1 pack/day for 45.8 years (45.8 ttl pk-yrs)     Types: Cigarettes     Start date: 1978    Smokeless tobacco: Never   Substance and Sexual Activity    Alcohol use: Not Currently    Drug use: No    Sexual activity: Yes     Social Determinants of Health     Financial Resource Strain: Low Risk  (7/31/2023)    Overall Financial Resource Strain (CARDIA)     Difficulty of Paying Living Expenses: Not very hard   Food Insecurity: No Food Insecurity (7/31/2023)    Hunger Vital Sign     Worried About Running Out of Food in the Last Year: Never true     Ran Out of Food in the Last Year: Never true   Transportation Needs: No Transportation Needs (7/31/2023)    PRAPARE - Transportation     Lack of Transportation (Medical): No     Lack of Transportation (Non-Medical): No   Physical Activity: Inactive (7/31/2023)    Exercise Vital Sign     Days of Exercise per Week: 0 days     Minutes of Exercise per Session: 0 min   Stress: No Stress Concern Present (5/25/2023)     [facility] - Occupational Stress Questionnaire     Feeling of Stress : Only a little   Social Connections: Socially Isolated (7/31/2023)    Social Connection and Isolation Panel [NHANES]     Frequency of Communication with Friends and Family: More than three times a week     Frequency of Social Gatherings with Friends and Family: More than three times a week     Attends Sikh Services: Never     Active Member of Clubs or Organizations: No     Attends Club or Organization Meetings: Never     Marital Status: Never    Housing Stability: Low Risk  (7/31/2023)    Housing Stability Vital Sign     Unable to Pay for Housing in the Last Year: No     Number of Places Lived in the Last Year: 2     Unstable Housing in the Last Year: No       Review of Systems   Constitutional:  Negative for activity change, fatigue, fever and unexpected weight change.   HENT:  Negative for congestion, ear pain, hearing loss, rhinorrhea and sore throat.    Eyes:  Negative for redness and visual disturbance.   Respiratory:  Negative for cough, shortness of breath and wheezing.    Cardiovascular:  Negative for chest pain, palpitations and leg swelling.   Gastrointestinal:  Negative for abdominal pain, constipation, diarrhea, nausea and vomiting.   Genitourinary:  Negative for decreased urine volume, dysuria, frequency and urgency.   Musculoskeletal:  Negative for back pain, joint swelling and neck pain.   Skin:  Negative for color change, rash and wound.   Neurological:  Negative for dizziness, tremors, weakness, light-headedness and headaches.         Objective:      Physical Exam  Vitals reviewed.   Constitutional:       General: He is not in acute distress.     Appearance: He is well-developed.   HENT:      Head: Normocephalic and atraumatic.      Right Ear: External ear normal.      Left Ear: External ear normal.   Eyes:      General:         Right eye: No discharge.         Left eye: No discharge.       Conjunctiva/sclera: Conjunctivae normal.   Neck:      Thyroid: No thyromegaly.   Cardiovascular:      Rate and Rhythm: Normal rate and regular rhythm.   Pulmonary:      Effort: Pulmonary effort is normal. No respiratory distress.      Breath sounds: Normal breath sounds.   Abdominal:      General: There is no distension.   Skin:     General: Skin is warm and dry.   Neurological:      Mental Status: He is alert and oriented to person, place, and time.   Psychiatric:         Behavior: Behavior normal.         Thought Content: Thought content normal.         Assessment:       1. Type 2 diabetes mellitus with hyperglycemia, with long-term current use of insulin    2. Tobacco use    3. Erectile dysfunction, unspecified erectile dysfunction type    4. Bipolar affective disorder, remission status unspecified    5. PVD (peripheral vascular disease) with claudication    6. Ischemic cardiomyopathy    7. Coronary artery disease involving native coronary artery of native heart with angina pectoris    8. Hyperlipidemia, unspecified hyperlipidemia type    9. NSVT (nonsustained ventricular tachycardia)    10. Automatic implantable cardiac defibrillator in situ        Plan:       1. Type 2 diabetes mellitus with hyperglycemia, with long-term current use of insulin  Chronic improving per home glucose readings  Update labs with A1C this week  Cont meds same dose pending A1C  ADA diet  Check sugars and keep log  Diabetic eye exam after visit today    -     dulaglutide (TRULICITY) 3 mg/0.5 mL pen injector; Inject 3 mg into the skin every 7 days.  Dispense: 4 pen ; Refill: 11  -     CBC Auto Differential; Future; Expected date: 10/24/2023  -     Comprehensive Metabolic Panel; Future; Expected date: 10/24/2023  -     TSH; Future; Expected date: 10/24/2023  -     Lipid Panel; Future; Expected date: 10/24/2023  -     Hemoglobin A1C; Future; Expected date: 10/24/2023  -     Microalbumin/Creatinine Ratio, Urine; Future; Expected date:  10/24/2023    2. Tobacco use  Agreeable to smoking cessation clinic  -     Ambulatory referral/consult to Smoking Cessation Program; Future; Expected date: 10/31/2023    3. Erectile dysfunction, unspecified erectile dysfunction type  Chronic uncontrolled  Severe PVD  Failed viagra (per report) and Cialis 20mg  To urology to discuss options for more advanced therapies  -     Ambulatory referral/consult to Urology; Future; Expected date: 10/31/2023    4. Bipolar affective disorder, remission status unspecified  Chronic stable  Cont meds per psych    5. PVD (peripheral vascular disease) with claudication  Chronic stable  Stop smoking!  Cont repatha-LDL control  Cont BP control  Cont glucose control  Cont cards follow up as planned  -     CBC Auto Differential; Future; Expected date: 10/24/2023  -     Comprehensive Metabolic Panel; Future; Expected date: 10/24/2023  -     TSH; Future; Expected date: 10/24/2023  -     Lipid Panel; Future; Expected date: 10/24/2023  -     Hemoglobin A1C; Future; Expected date: 10/24/2023  -     Ambulatory referral/consult to Smoking Cessation Program; Future; Expected date: 10/31/2023    6. Ischemic cardiomyopathy  Chronic stable  Stop smoking!  Cont repatha-LDL control  Cont BP control  Cont glucose control  Cont cards follow up as planned  No signs of volume overload today  -     CBC Auto Differential; Future; Expected date: 10/24/2023  -     Comprehensive Metabolic Panel; Future; Expected date: 10/24/2023  -     TSH; Future; Expected date: 10/24/2023  -     Lipid Panel; Future; Expected date: 10/24/2023  -     Hemoglobin A1C; Future; Expected date: 10/24/2023  -     Ambulatory referral/consult to Smoking Cessation Program; Future; Expected date: 10/31/2023    7. Coronary artery disease involving native coronary artery of native heart with angina pectoris  Chronic stable  Stop smoking!  Cont repatha-LDL control  Cont BP control  Cont glucose control  Cont cards follow up as planned  -      CBC Auto Differential; Future; Expected date: 10/24/2023  -     Comprehensive Metabolic Panel; Future; Expected date: 10/24/2023  -     TSH; Future; Expected date: 10/24/2023  -     Lipid Panel; Future; Expected date: 10/24/2023  -     Hemoglobin A1C; Future; Expected date: 10/24/2023  -     Ambulatory referral/consult to Smoking Cessation Program; Future; Expected date: 10/31/2023    8. Hyperlipidemia, unspecified hyperlipidemia type  Chronic stable  Cont meds same dose  -     CBC Auto Differential; Future; Expected date: 10/24/2023  -     Comprehensive Metabolic Panel; Future; Expected date: 10/24/2023  -     TSH; Future; Expected date: 10/24/2023  -     Lipid Panel; Future; Expected date: 10/24/2023  -     Hemoglobin A1C; Future; Expected date: 10/24/2023    9. NSVT (nonsustained ventricular tachycardia)  Chronic stable  S/p VT ablation 7/2023  Cont cards follow up as planned  -     CBC Auto Differential; Future; Expected date: 10/24/2023  -     Comprehensive Metabolic Panel; Future; Expected date: 10/24/2023  -     TSH; Future; Expected date: 10/24/2023  -     Lipid Panel; Future; Expected date: 10/24/2023  -     Hemoglobin A1C; Future; Expected date: 10/24/2023    10. Automatic implantable cardiac defibrillator in situ  Chronic stable  Cont cards follow up as planned for management       RTC 6 months with labs and PRN pending labs this week

## 2023-10-24 NOTE — PROGRESS NOTES
Miles Currie is a 58 y.o. male here for a diabetic eye screening with non-dilated fundus photos per Dr. Adela Gifford.    Patient cooperative?: Yes  Small pupils?: Yes  Last eye exam: unknown    For exam results, see Encounter Report.

## 2023-11-21 ENCOUNTER — PATIENT OUTREACH (OUTPATIENT)
Dept: ADMINISTRATIVE | Facility: HOSPITAL | Age: 58
End: 2023-11-21
Payer: MEDICARE

## 2023-11-29 DIAGNOSIS — E11.9 TYPE 2 DIABETES MELLITUS WITHOUT COMPLICATION: ICD-10-CM

## 2024-01-30 ENCOUNTER — TELEPHONE (OUTPATIENT)
Dept: INTERNAL MEDICINE | Facility: CLINIC | Age: 59
End: 2024-01-30
Payer: MEDICARE

## 2024-01-30 ENCOUNTER — PATIENT OUTREACH (OUTPATIENT)
Dept: ADMINISTRATIVE | Facility: HOSPITAL | Age: 59
End: 2024-01-30
Payer: MEDICARE

## 2024-01-30 DIAGNOSIS — Z72.0 TOBACCO USE: Primary | ICD-10-CM

## 2024-01-30 DIAGNOSIS — F17.210 CIGARETTE SMOKER: ICD-10-CM

## 2024-01-30 NOTE — TELEPHONE ENCOUNTER
----- Message from Falguni Zimmer LPN sent at 1/30/2024 11:17 AM CST -----  Regarding: Statin Therapy  Good morning,     Patient is listed on the non-compliant report for statin therapy. Patient has a diagnosis of CVD and DM2.  Please review need for statin therapy.       If statin intolerant or has allergy to statin, please document in problem list and add allergy.      The following diagnosis may be used for a patient who needs to be removed from the Statin therapy recommendations list.   .   Dx: G720. Drug-induced Myopathy          G72.9 Myopathy           M60.9 Myositis         M79.10 Myalgia           Thank you,   Falguni Zimmer LPN

## 2024-01-30 NOTE — PROGRESS NOTES
Population Health Chart Review & Patient Outreach Details    Outreach Performed: YES Telephone Successful    Additional Pop Health Notes:    Contacted patient to discuss Enhanced Annual Wellness Visit, Colorectal Cancer Screening, Statin Therapy, Low Dose CT Lung screen and scheduling Lab appointment for Diabetic labs.     NOV with PCP: Not scheduled.  LOV with PCP: 10/24/2023.       Updates Requested / Reviewed:      Updated Care Coordination Note, Care Everywhere, , External Sources: [facility] and [facility], Care Team Updated, Removed  or Duplicate Orders, and Immunizations Reconciliation Completed or Queried: [place]    Social Determinants Reviewed and Updated:  Tobacco use  Food insecurity  Transportation  Financial Resource Strain         Health Maintenance Topics Overdue:    Health Maintenance Due   Topic Date Due    High Dose Statin  Never done    Colorectal Cancer Screening  Never done    Shingles Vaccine (1 of 2) Never done    LDCT Lung Screen  09/15/2021    Foot Exam  2021    Pneumococcal Vaccines (Age 0-64) (2 of 2 - PCV) 10/06/2021    Diabetes Urine Screening  2023    Lipid Panel  2023    COVID-19 Vaccine (2 - -24 season) 2023    Hemoglobin A1c  2023         Health Maintenance Topic(s) Outreach Outcomes & Actions Taken:    Colorectal Cancer Screening - Outreach Outcomes & Actions Taken  : Patient opted for Fit Kit.  FitKit was given to patient on 2024 11:05 AM     Lab(s) - Outreach Outcomes & Actions Taken  : Lab orders placed.  Patient will stop by hospital to have lab work completed soon.     Low Dose CT Screening - Outreach Outcomes & Actions Taken  : CT order placed. Patient declines scheduling at this time, will talk to provider at next visit.     Medication Adherence / Statins - Outreach Outcomes & Actions Taken  : Sent Provider Message to Review to Evaluate Pt for Statin, Add Exclusion Dx Codes, Document Exclusion in Problem List, Change  Statin Intensity Level to Moderate or High Intensity if Applicable     Enhanced Annual Wellness visit: Patient scheduled eAWV for 03/05/2024.

## 2024-01-30 NOTE — TELEPHONE ENCOUNTER
Reviewed chart: I believe he is doing all HLD management with [facility]. If I remember correctly his LDL was uncontrolled on Crestor and [facility] switched him to repatha for better control. However, I don't have that documented in my last note. Can we call to verify with patient? Either we just don't have an updated med list and he is getting crestor from [facility] or it was stopped and replaced with repatha for control. Thanks

## 2024-02-06 DIAGNOSIS — Z12.11 COLON CANCER SCREENING: ICD-10-CM

## 2024-02-19 PROBLEM — I20.9 ANGINA, CLASS III: Status: ACTIVE | Noted: 2024-02-19

## 2024-02-20 ENCOUNTER — LAB VISIT (OUTPATIENT)
Dept: LAB | Facility: HOSPITAL | Age: 59
End: 2024-02-20
Attending: INTERNAL MEDICINE
Payer: MEDICARE

## 2024-02-20 DIAGNOSIS — R19.5 POSITIVE FIT (FECAL IMMUNOCHEMICAL TEST): Primary | ICD-10-CM

## 2024-02-20 DIAGNOSIS — Z12.11 COLON CANCER SCREENING: ICD-10-CM

## 2024-02-20 LAB — HEMOCCULT STL QL IA: POSITIVE

## 2024-02-20 PROCEDURE — 82274 ASSAY TEST FOR BLOOD FECAL: CPT | Mod: HCNC | Performed by: INTERNAL MEDICINE

## 2024-02-21 PROBLEM — Z95.810 AICD (AUTOMATIC CARDIOVERTER/DEFIBRILLATOR) PRESENT: Status: ACTIVE | Noted: 2024-02-21

## 2024-02-21 PROBLEM — I95.9 HYPOTENSION: Status: ACTIVE | Noted: 2024-02-21

## 2024-02-21 PROBLEM — U07.1 COVID-19 VIRUS INFECTION: Status: ACTIVE | Noted: 2024-02-21

## 2024-03-05 ENCOUNTER — OFFICE VISIT (OUTPATIENT)
Dept: INTERNAL MEDICINE | Facility: CLINIC | Age: 59
End: 2024-03-05
Payer: MEDICARE

## 2024-03-05 VITALS
RESPIRATION RATE: 22 BRPM | SYSTOLIC BLOOD PRESSURE: 126 MMHG | BODY MASS INDEX: 29.05 KG/M2 | OXYGEN SATURATION: 98 % | WEIGHT: 180.75 LBS | HEIGHT: 66 IN | DIASTOLIC BLOOD PRESSURE: 80 MMHG | HEART RATE: 73 BPM

## 2024-03-05 DIAGNOSIS — Z72.0 TOBACCO ABUSE: ICD-10-CM

## 2024-03-05 DIAGNOSIS — K21.9 GASTROESOPHAGEAL REFLUX DISEASE WITHOUT ESOPHAGITIS: ICD-10-CM

## 2024-03-05 DIAGNOSIS — Z95.810 AICD (AUTOMATIC CARDIOVERTER/DEFIBRILLATOR) PRESENT: ICD-10-CM

## 2024-03-05 DIAGNOSIS — E11.40 CONTROLLED TYPE 2 DIABETES WITH NEUROPATHY: ICD-10-CM

## 2024-03-05 DIAGNOSIS — G47.33 OSA (OBSTRUCTIVE SLEEP APNEA): ICD-10-CM

## 2024-03-05 DIAGNOSIS — Z12.5 SCREENING FOR PROSTATE CANCER: ICD-10-CM

## 2024-03-05 DIAGNOSIS — D69.6 THROMBOCYTOPENIA, UNSPECIFIED: ICD-10-CM

## 2024-03-05 DIAGNOSIS — I50.42 CHRONIC COMBINED SYSTOLIC AND DIASTOLIC HEART FAILURE: ICD-10-CM

## 2024-03-05 DIAGNOSIS — Z00.00 ENCOUNTER FOR PREVENTIVE HEALTH EXAMINATION: Primary | ICD-10-CM

## 2024-03-05 DIAGNOSIS — Z79.4 TYPE 2 DIABETES MELLITUS WITH OTHER CIRCULATORY COMPLICATION, WITH LONG-TERM CURRENT USE OF INSULIN: ICD-10-CM

## 2024-03-05 DIAGNOSIS — R91.1 PULMONARY NODULE, RIGHT: ICD-10-CM

## 2024-03-05 DIAGNOSIS — E78.5 HYPERLIPIDEMIA, UNSPECIFIED HYPERLIPIDEMIA TYPE: ICD-10-CM

## 2024-03-05 DIAGNOSIS — I25.5 ISCHEMIC CARDIOMYOPATHY: ICD-10-CM

## 2024-03-05 DIAGNOSIS — I73.9 PVD (PERIPHERAL VASCULAR DISEASE) WITH CLAUDICATION: ICD-10-CM

## 2024-03-05 DIAGNOSIS — S68.622D PARTIAL TRAUMATIC AMPUTATION OF RIGHT MIDDLE FINGER THROUGH PHALANX, SUBSEQUENT ENCOUNTER: ICD-10-CM

## 2024-03-05 DIAGNOSIS — E11.40 TYPE 2 DIABETES MELLITUS WITH DIABETIC NEUROPATHY, WITHOUT LONG-TERM CURRENT USE OF INSULIN: ICD-10-CM

## 2024-03-05 DIAGNOSIS — I73.9 PAD (PERIPHERAL ARTERY DISEASE): ICD-10-CM

## 2024-03-05 DIAGNOSIS — E11.59 TYPE 2 DIABETES MELLITUS WITH OTHER CIRCULATORY COMPLICATION, WITH LONG-TERM CURRENT USE OF INSULIN: ICD-10-CM

## 2024-03-05 DIAGNOSIS — Z23 NEED FOR PNEUMOCOCCAL VACCINATION: ICD-10-CM

## 2024-03-05 DIAGNOSIS — I20.9 ANGINA, CLASS III: ICD-10-CM

## 2024-03-05 DIAGNOSIS — E11.65 UNCONTROLLED TYPE 2 DIABETES MELLITUS WITH HYPERGLYCEMIA: ICD-10-CM

## 2024-03-05 DIAGNOSIS — I70.229 CRITICAL LOWER LIMB ISCHEMIA: ICD-10-CM

## 2024-03-05 DIAGNOSIS — I10 ESSENTIAL (PRIMARY) HYPERTENSION: ICD-10-CM

## 2024-03-05 DIAGNOSIS — I47.29 NSVT (NONSUSTAINED VENTRICULAR TACHYCARDIA): ICD-10-CM

## 2024-03-05 DIAGNOSIS — J44.9 CHRONIC OBSTRUCTIVE PULMONARY DISEASE, UNSPECIFIED COPD TYPE: ICD-10-CM

## 2024-03-05 DIAGNOSIS — I25.119 CORONARY ARTERY DISEASE INVOLVING NATIVE CORONARY ARTERY OF NATIVE HEART WITH ANGINA PECTORIS: ICD-10-CM

## 2024-03-05 PROBLEM — F31.9 BIPOLAR DISORDER: Status: RESOLVED | Noted: 2021-02-03 | Resolved: 2024-03-05

## 2024-03-05 PROBLEM — S68.622A: Status: ACTIVE | Noted: 2024-03-05

## 2024-03-05 PROCEDURE — 90677 PCV20 VACCINE IM: CPT | Mod: HCNC,S$GLB,, | Performed by: NURSE PRACTITIONER

## 2024-03-05 PROCEDURE — G0009 ADMIN PNEUMOCOCCAL VACCINE: HCPCS | Mod: HCNC,S$GLB,, | Performed by: NURSE PRACTITIONER

## 2024-03-05 PROCEDURE — 4010F ACE/ARB THERAPY RXD/TAKEN: CPT | Mod: HCNC,CPTII,S$GLB, | Performed by: NURSE PRACTITIONER

## 2024-03-05 PROCEDURE — 3074F SYST BP LT 130 MM HG: CPT | Mod: HCNC,CPTII,S$GLB, | Performed by: NURSE PRACTITIONER

## 2024-03-05 PROCEDURE — 1159F MED LIST DOCD IN RCRD: CPT | Mod: HCNC,CPTII,S$GLB, | Performed by: NURSE PRACTITIONER

## 2024-03-05 PROCEDURE — G0439 PPPS, SUBSEQ VISIT: HCPCS | Mod: HCNC,S$GLB,, | Performed by: NURSE PRACTITIONER

## 2024-03-05 PROCEDURE — 99999 PR PBB SHADOW E&M-EST. PATIENT-LVL IV: CPT | Mod: PBBFAC,HCNC,, | Performed by: NURSE PRACTITIONER

## 2024-03-05 PROCEDURE — 3046F HEMOGLOBIN A1C LEVEL >9.0%: CPT | Mod: HCNC,CPTII,S$GLB, | Performed by: NURSE PRACTITIONER

## 2024-03-05 PROCEDURE — 3079F DIAST BP 80-89 MM HG: CPT | Mod: HCNC,CPTII,S$GLB, | Performed by: NURSE PRACTITIONER

## 2024-03-05 RX ORDER — SACUBITRIL AND VALSARTAN 24; 26 MG/1; MG/1
TABLET, FILM COATED ORAL
COMMUNITY
Start: 2022-11-22

## 2024-03-05 RX ORDER — ALIROCUMAB 75 MG/ML
INJECTION, SOLUTION SUBCUTANEOUS
COMMUNITY
Start: 2022-11-23

## 2024-03-05 RX ORDER — CARVEDILOL 6.25 MG/1
TABLET ORAL
COMMUNITY
Start: 2022-11-22

## 2024-03-05 RX ORDER — EZETIMIBE 10 MG/1
TABLET ORAL
COMMUNITY
Start: 2022-11-22

## 2024-03-05 RX ORDER — ISOSORBIDE MONONITRATE 30 MG/1
TABLET, EXTENDED RELEASE ORAL
COMMUNITY
Start: 2024-02-22

## 2024-03-05 RX ORDER — EMPAGLIFLOZIN 10 MG/1
10 TABLET, FILM COATED ORAL
COMMUNITY

## 2024-03-05 RX ORDER — BUPROPION HYDROCHLORIDE 150 MG/1
TABLET, EXTENDED RELEASE ORAL
COMMUNITY
Start: 2022-11-22

## 2024-03-05 RX ORDER — TORSEMIDE 20 MG/1
10 TABLET ORAL DAILY PRN
COMMUNITY

## 2024-03-05 RX ORDER — ALPRAZOLAM 0.25 MG/1
0.25 TABLET ORAL DAILY PRN
COMMUNITY

## 2024-03-05 RX ORDER — SPIRONOLACTONE 25 MG/1
TABLET ORAL
COMMUNITY
Start: 2022-11-22

## 2024-03-05 NOTE — PROGRESS NOTES
"  Miles Currie presented for a  Medicare AWV and comprehensive Health Risk Assessment today. The following components were reviewed and updated:    Medical history  Family History  Social history  Allergies and Current Medications  Health Risk Assessment  Health Maintenance  Care Team         ** See Completed Assessments for Annual Wellness Visit within the encounter summary.**         The following assessments were completed:  Living Situation  CAGE  Depression Screening  Timed Get Up and Go  Whisper Test  Cognitive Function Screening  Nutrition Screening  ADL Screening  PAQ Screening      Opioid documentation:      Patient does not have a current opioid prescription.        Vitals:    03/05/24 0827   BP: 126/80   Pulse: 73   Resp: (!) 22   SpO2: 98%   Weight: 82 kg (180 lb 12.4 oz)   Height: 5' 6" (1.676 m)     Body mass index is 29.18 kg/m².  Physical Exam  Vitals and nursing note reviewed.   Constitutional:       Appearance: Normal appearance.   HENT:      Head: Normocephalic and atraumatic.   Cardiovascular:      Rate and Rhythm: Normal rate and regular rhythm.      Heart sounds: No murmur heard.  Pulmonary:      Effort: Pulmonary effort is normal.      Breath sounds: Rales present.   Abdominal:      General: There is no distension.      Palpations: Abdomen is soft.   Musculoskeletal:         General: Swelling present. Normal range of motion.      Comments: Trace pitting edema bilateral lower ext    Skin:     General: Skin is warm and dry.      Capillary Refill: Capillary refill takes less than 2 seconds.      Findings: Bruising present.   Neurological:      General: No focal deficit present.      Mental Status: He is alert and oriented to person, place, and time.   Psychiatric:         Mood and Affect: Mood normal.         Behavior: Behavior normal.         Thought Content: Thought content normal.         Judgment: Judgment normal.               Diagnoses and health risks identified today and associated " recommendations/orders:    1. Encounter for preventive health examination  Fobt was positive 2/20/24> advised to schedule colonoscopy per pcp scheduled 4/2024  Lipid done with [facility] on notes in media from 8/2023   Cbc/cmp 2/22/24  A1c 8/1/23   Last PSA was 3 years ago  Pt is in need of f/u with PCP- will be scheduled with labs needs better A1c control > labs ordered and next available pcp f/u visit scheduled   Pneumonia vaccine given today and discussed shingrix- rx given- will discuss with [facility] as he thinks he has one there but it is not recorded     2. Chronic obstructive pulmonary disease, unspecified COPD type  Still smokes   On symbicort and albuterol  Follows wit PCP has not seen pulmonologist  Coughs daily with mucus production     3. Thrombocytopenia, unspecified  Lab Results   Component Value Date    WBC 5.40 02/22/2024    HGB 14.6 02/22/2024    HCT 44.7 02/22/2024    MCV 91 02/22/2024     (L) 02/22/2024   On eliquis and plavix     4. Critical lower limb ischemia  S/p aorta-iliac-femoral bipass 2/2024 per [facility]   On praluent, zetia, crestor,  fenofibrate plavix, eliquis and mexitil      5. Type 2 diabetes mellitus with diabetic neuropathy, without long-term current use of insulin  Uncontrolled on lantus and trulicity and jardiance  Diab with hyperglycemia last A1c 10.3 2/21/24 - glucose on last cmp 204   f.u with pcp made   Allergy to gabapentin     6. Uncontrolled type 2 diabetes mellitus with hyperglycemia  Uncontrolled on lantus and trulicity and jardiance  Diab with hyperglycemia last A1c 10.3 2/21/24 - glucose on last cmp 204   f.u with pcp made     7. Type 2 diabetes mellitus with other circulatory complication, with long-term current use of insulin  Uncontrolled on lantus and trulicity and jardiance  Diab with hyperglycemia last A1c 10.3 2/21/24 - glucose on last cmp 204   f.u with pcp made   Severe PAD/PVD follows with [facility]     8. PVD (peripheral vascular disease) with  claudication  S/p aorta-iliac-femoral bipass 2/2024 per [facility]   On praluent, zetia, crestor,  fenofibrate plavix, eliquis and mexitil    9. PAD (peripheral artery disease)  S/p aorta-iliac-femoral bipass 2/2024 per [facility]   On praluent, zetia, crestor,  fenofibrate plavix, eliquis and mexitil    10. Pulmonary nodule, right  Seen on ct of chest 6/2019 > 12 mm right lower lobe pulmonary nodule containing a focus of cavitation   Was not seen on CXR 6 months later- has ct low dose screen ordered for f/u     11. Ischemic cardiomyopathy  Biventricular pace maker > follows with [facility] due to ischemic CMO  CAD 2/20/24 C photos in media with 100% blockage noted to circumflex ion stent and stents noted to LAD s/p CABG x 3  Chronic systolic heart failure class III> follows with [facility] EF 15% echo noted on [facility] notes in media from 8/2023   Athersclerosis left leg with intermittent claudication-PAD s/p aorta-iliac femoral bypass     12. Hyperlipidemia, unspecified hyperlipidemia type  On praluent, zetia, crestor,  fenofibrate plavix, eliquis and mexitil  Follows with [facility]     13. Gastroesophageal reflux disease without esophagitis  On PPI    14. Coronary artery disease involving native coronary artery of native heart with angina pectoris  On praluent, zetia, crestor,  fenofibrate plavix, eliquis and mexitil  Follows with [facility]     15. Chronic combined systolic and diastolic heart failure  Follows with [facility]   Biventricular pace maker > follows with [facility] due to ischemic CMO  CAD 2/20/24 C photos in media with 100% blockage noted to circumflex ion stent and stents noted to LAD s/p CABG x 3  Chronic systolic heart failure class III> follows with [facility] EF 15% echo noted on [facility] notes in media from 8/2023   On pacerone for NSVT, coreg/imdur/toprol/jardiance/entresto/aldactone and demadex     16. Angina, class III  Follows with [facility]   Biventricular pace maker > follows with [facility] due to ischemic CMO  CAD 2/20/24 LHC photos in media with 100% blockage  noted to circumflex ion stent and stents noted to LAD s/p CABG x 3  Chronic systolic heart failure class III> follows with [facility] EF 15% echo noted on [facility] notes in media from 8/2023   On pacerone for NSVT, coreg/imdur/toprol/jardiance/entresto/aldactone and demadex     17. AICD (automatic cardioverter/defibrillator) present  Chronic systolic heart failure class III> follows with [facility] EF 15% echo noted on [facility] notes in media from 8/2023   Also noted NSVT- on amiodarone     18. UnControlled type 2 diabetes with neuropathy  Uncontrolled on lantus and trulicity and jardiance  Diab with hyperglycemia last A1c 10.3 2/21/24 - glucose on last cmp 204   f.u with pcp made   Severe PAD/PVD follows with [facility]   Allergy to gabapentin     19. 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 Tobacco abuse  Not ready to quit     21. YOLY (obstructive sleep apnea)  Does not have cpap- f/u with PCP     22. Need for pneumococcal vaccination  Given  today in office   - Pneumococcal conjugate vaccine 20 (PCV20) *Preferred* (PREVNAR 20) - (IM)    23. NSVT (nonsustained ventricular tachycardia)  CMO> s/p biventricular pacemaker and defibrillator  Follows with [facility]  On amiodarone       Provided Miles with a 5-10 year written screening schedule and personal prevention plan. Recommendations were developed using the USPSTF age appropriate recommendations. Education, counseling, and referrals were provided as needed. After Visit Summary printed and given to patient which includes a list of additional screenings\tests needed.    No follow-ups on file.    Adelaida Cisneros NP          I offered to discuss advanced care planning, including how to pick a person who would make decisions for you if you were unable to make them for yourself, called a health care power of , and what kind of decisions you might  make such as use of life sustaining treatments such as ventilators and tube feeding when faced with a life limiting illness recorded on a living will that they will need to know. (How you want to be cared for as you near the end of your natural life)     X Patient is interested in learning more about how to make advanced directives.  I provided them paperwork and offered to discuss this with them. Hammond shave a wife but has been  but not legally- also has children. Will complete POA to give decision making capacity to some one else other than his wife- also discussed LW- will think about it and return completed forms to PCP

## 2024-03-05 NOTE — PATIENT INSTRUCTIONS
Counseling and Referral of Other Preventative  (Italic type indicates deductible and co-insurance are waived)    Patient Name: Miles Currie  Today's Date: 3/5/2024    Health Maintenance       Date Due Completion Date    Shingles Vaccine (1 of 2) Never done ---    LDCT Lung Screen 09/15/2021 9/15/2020    Foot Exam 09/22/2021 9/22/2020    Pneumococcal Vaccines (Age 0-64) (2 of 2 - PCV) 10/06/2021 10/6/2020    Diabetes Urine Screening 08/31/2023 8/31/2022    Lipid Panel 08/31/2023 8/31/2022    COVID-19 Vaccine (3 - 2023-24 season) 09/01/2023 10/19/2021    Influenza Vaccine (1) 06/30/2024 (Originally 9/1/2023) 10/6/2020    Hemoglobin A1c 05/21/2024 2/21/2024    Eye Exam 10/24/2024 10/24/2023    Colorectal Cancer Screening 02/20/2025 2/20/2024    High Dose Statin 02/21/2025 2/21/2024    TETANUS VACCINE 09/26/2028 9/26/2018        Orders Placed This Encounter   Procedures    Pneumococcal conjugate vaccine 20 (PCV20) *Preferred* (PREVNAR 20) - (IM)         The following information is provided to all patients.  This information is to help you find resources for any of the problems found today that may be affecting your health:                  Living healthy guide: www.[facility].[place].[facility]      Understanding Diabetes: www.diabetes.org      Eating healthy: www.cdc.gov/healthyweight      CDC home safety checklist: www.cdc.gov/steadi/patient.html      Agency on Aging: www.goea.[place].[facility]      Alcoholics anonymous (AA): www.aa.org      Physical Activity: www.[place].nih.gov/ug9ivvi      Tobacco use: www.quitwithusla.org

## 2024-03-25 ENCOUNTER — TELEPHONE (OUTPATIENT)
Dept: INTERNAL MEDICINE | Facility: CLINIC | Age: 59
End: 2024-03-25
Payer: MEDICARE

## 2024-03-25 ENCOUNTER — PATIENT OUTREACH (OUTPATIENT)
Dept: ADMINISTRATIVE | Facility: HOSPITAL | Age: 59
End: 2024-03-25
Payer: MEDICARE

## 2024-03-25 DIAGNOSIS — Z12.11 COLON CANCER SCREENING: Primary | ICD-10-CM

## 2024-03-25 RX ORDER — INSULIN GLARGINE 100 [IU]/ML
40 INJECTION, SOLUTION SUBCUTANEOUS 2 TIMES DAILY
Qty: 72 ML | Refills: 3 | Status: SHIPPED | OUTPATIENT
Start: 2024-03-25 | End: 2025-03-25

## 2024-03-25 NOTE — PROGRESS NOTES
Population Health Chart Review & Patient Outreach Details      Additional [facility] Notes:    Contacted patient to discuss Colorectal and Lung Cancer Screening and scheduling Lab appointment.   Patient had positive Fit Kit on 2024. Case request order placed for Endoscopy for colonoscopy.  Patient will have labs completed in may when he is due to have HgbA1c completed. States he will complete at [facility] on a Saturday.   States he will discuss Lung Screening after he completes Colonoscopy.     NOV with PCP: Not scheduled.  LOV with PCP: 2024.           Updates Requested / Reviewed:      Updated Care Coordination Note, Care Everywhere, , External Sources: [facility] and [facility], Care Team Updated, Removed  or Duplicate Orders, and Immunizations Reconciliation Completed or Queried: [place]         Health Maintenance Topics Overdue:      [facility] Score: 4     Urine Screening  Lipid Panel  Foot Exam  LDCT Lung Screen                       Health Maintenance Topic(s) Outreach Outcomes & Actions Taken:    Colorectal Cancer Screening - Outreach Outcomes & Actions Taken  : Colonoscopy Case Request / Referral / Home Test Order Placed    Low Dose CT Screening - Outreach Outcomes & Actions Taken  : Patient states he will discuss scheduling once colonoscopy completed.     Lab(s) - Outreach Outcomes & Actions Taken  : Patient will have labs completed in May when he is due to have HgbA1c redrawn.

## 2024-03-25 NOTE — TELEPHONE ENCOUNTER
Not sure what happened, but we never received anything. Can you fill for patient?   Constitutional: no fever, no chills  Head: NC, AT   Eyes: no redness   ENMT: no nasal congestion/drainage, no sore throat   CV: no chest pain, no edema  Resp: no cough, no dyspnea  GI: no abdominal pain, no nausea, no vomiting, no diarrhea  : + dysuria, no hematuria   Skin: no lesions, no rashes   Neuro: no LOC, no headache, no sensory deficits, no weakness

## 2024-03-25 NOTE — TELEPHONE ENCOUNTER
----- Message from Ramona Steinberg sent at 3/25/2024 12:11 PM CDT -----  Contact: Veronika Valentina Currie  MRN: [id number]  : 1965  PCP: Adela Gifford  Home Phone      [phone]  Work Phone      Not on file.  Mobile          [phone]      MESSAGE:     [facility] called stating they sent a refill request for insulin (LANTUS SOLOSTAR U-100 INSULIN) glargine 100 units/mL SubQ pen and received it back stating he is not a pt they are wanting to know what is going on with this.       Please advise   [phone]

## 2024-03-25 NOTE — TELEPHONE ENCOUNTER
----- Message from Falguni Zimmer LPN sent at 3/25/2024  2:31 PM CDT -----  Regarding: Medication Refill  Patient requesting refill for Lantus SolostarU- 100 Insulin.    Pharmacy: [facility].    Patient call back number: [phone]    Thank you,  Falguni Zimmer LPN

## 2024-03-25 NOTE — TELEPHONE ENCOUNTER
No care due was identified.  [facility] Embedded Care Due Messages. Reference number: [id number].   3/25/2024 2:18:03 PM CDT

## 2024-08-31 ENCOUNTER — HOSPITAL ENCOUNTER (EMERGENCY)
Facility: HOSPITAL | Age: 59
Discharge: HOME | End: 2024-08-31
Attending: SURGERY
Payer: MEDICARE

## 2024-08-31 VITALS
OXYGEN SATURATION: 100 % | HEART RATE: 70 BPM | HEIGHT: 66 IN | TEMPERATURE: 98 F | WEIGHT: 184.88 LBS | RESPIRATION RATE: 16 BRPM | BODY MASS INDEX: 29.71 KG/M2 | DIASTOLIC BLOOD PRESSURE: 80 MMHG | SYSTOLIC BLOOD PRESSURE: 126 MMHG

## 2024-08-31 DIAGNOSIS — L02.91 ABSCESS: Primary | ICD-10-CM

## 2024-08-31 PROCEDURE — 90715 TDAP VACCINE 7 YRS/> IM: CPT | Mod: HCNC | Performed by: SURGERY

## 2024-08-31 PROCEDURE — 87186 SC STD MICRODIL/AGAR DIL: CPT | Mod: HCNC | Performed by: SURGERY

## 2024-08-31 PROCEDURE — 87070 CULTURE OTHR SPECIMN AEROBIC: CPT | Mod: HCNC | Performed by: SURGERY

## 2024-08-31 PROCEDURE — 87077 CULTURE AEROBIC IDENTIFY: CPT | Mod: HCNC | Performed by: SURGERY

## 2024-08-31 PROCEDURE — 99284 EMERGENCY DEPT VISIT MOD MDM: CPT | Mod: 25,HCNC

## 2024-08-31 PROCEDURE — 90471 IMMUNIZATION ADMIN: CPT | Mod: HCNC | Performed by: SURGERY

## 2024-08-31 PROCEDURE — 63600175 PHARM REV CODE 636 W HCPCS: Mod: HCNC | Performed by: SURGERY

## 2024-08-31 PROCEDURE — 25000003 PHARM REV CODE 250: Mod: HCNC | Performed by: SURGERY

## 2024-08-31 RX ORDER — SULFAMETHOXAZOLE AND TRIMETHOPRIM 800; 160 MG/1; MG/1
1 TABLET ORAL
Status: COMPLETED | OUTPATIENT
Start: 2024-08-31 | End: 2024-08-31

## 2024-08-31 RX ORDER — MUPIROCIN 20 MG/G
OINTMENT TOPICAL 3 TIMES DAILY
Qty: 15 G | Refills: 0 | Status: SHIPPED | OUTPATIENT
Start: 2024-08-31 | End: 2024-09-10

## 2024-08-31 RX ORDER — MUPIROCIN 20 MG/G
OINTMENT TOPICAL
Status: COMPLETED | OUTPATIENT
Start: 2024-08-31 | End: 2024-08-31

## 2024-08-31 RX ORDER — SULFAMETHOXAZOLE AND TRIMETHOPRIM 800; 160 MG/1; MG/1
1 TABLET ORAL 2 TIMES DAILY
Qty: 14 TABLET | Refills: 0 | Status: SHIPPED | OUTPATIENT
Start: 2024-08-31 | End: 2024-09-07

## 2024-08-31 RX ORDER — HYDROCODONE BITARTRATE AND ACETAMINOPHEN 5; 325 MG/1; MG/1
1 TABLET ORAL EVERY 4 HOURS PRN
Qty: 15 TABLET | Refills: 0 | Status: SHIPPED | OUTPATIENT
Start: 2024-08-31 | End: 2024-09-03

## 2024-08-31 RX ORDER — LIDOCAINE HYDROCHLORIDE 10 MG/ML
10 INJECTION, SOLUTION EPIDURAL; INFILTRATION; INTRACAUDAL; PERINEURAL
Status: COMPLETED | OUTPATIENT
Start: 2024-08-31 | End: 2024-08-31

## 2024-08-31 RX ADMIN — LIDOCAINE HYDROCHLORIDE 100 MG: 10 INJECTION, SOLUTION EPIDURAL; INFILTRATION; INTRACAUDAL at 10:08

## 2024-08-31 RX ADMIN — SULFAMETHOXAZOLE AND TRIMETHOPRIM 1 TABLET: 800; 160 TABLET ORAL at 11:08

## 2024-08-31 RX ADMIN — MUPIROCIN: 20 OINTMENT TOPICAL at 10:08

## 2024-08-31 RX ADMIN — TETANUS TOXOID, REDUCED DIPHTHERIA TOXOID AND ACELLULAR PERTUSSIS VACCINE, ADSORBED 0.5 ML: 5; 2.5; 8; 8; 2.5 SUSPENSION INTRAMUSCULAR at 10:08

## 2024-08-31 NOTE — ED PROVIDER NOTES
Encounter Date: 8/31/2024       History     Chief Complaint   Patient presents with    Insect Bite     History of Present Illness  Miles Currie is a 58 y.o. male that presents with a left forearm abscess  Patient feels an insect bite bit him on the ventral left forearm 3 days ago  Patient now with abscess to left forearm with a local erythematous reaction  Does not look like definitive cellulitis, does agree to incision & drainage now  Patient states he has had his diabetes under control for last several months  Good distal pulses, good capillary refill, will update tetanus status in the ED    The history is provided by the patient.     Review of patient's allergies indicates:   Allergen Reactions    Fish oil Swelling    Gabapentin Swelling     Past Medical History:   Diagnosis Date    Angina pectoris     Anticoagulant long-term use     plavix and  pletal    Anxiety and depression     Bipolar disorder     Patient denies    CHF (congestive heart failure)     Chronic bronchitis     Coronary artery disease     Diabetes mellitus     Diabetes with neurologic complications     Dyslipidemia     Encounter for blood transfusion     GERD (gastroesophageal reflux disease)     History of claustrophobia     IF SOMEONE TOUCHES HIS NOSE    Hyperlipidemia     Hypertension     Irregular heartbeat     Ischemic cardiomyopathy     MI (myocardial infarction) 2009    multiple MI's    Multiple gastric ulcers     Pacemaker     PAD (peripheral artery disease)     Pneumonia     Polyneuropathy     Presence of automatic implantable cardioverter-defibrillator     PVD (peripheral vascular disease)     Respiratory failure     Sleep apnea     does not have machine     Tobacco dependence     Trouble in sleeping     Type 2 diabetes with peripheral circulatory disorder, controlled      Past Surgical History:   Procedure Laterality Date    ABLATION N/A 8/2/2023    Procedure: Ablation;  Surgeon: Ge Benedict MD;  Location: [facility] CATH;  Service:  Cardiology;  Laterality: N/A;  *ICD*    ANGIOPLASTY Bilateral     right  and left iliac stents    AORTA - BILATERAL FEMORAL ARTERY BYPASS GRAFT  07/09/2013    APPENDECTOMY      ARTERIAL THROMBECTOMY Right 02/16/2012    iliac artery    ARTERIAL THROMBECTOMY Left 02/23/2012    brachial artery    CARDIAC DEFIBRILLATOR PLACEMENT Right 10/23/2012    [employer]    CORONARY ANGIOPLASTY WITH STENT PLACEMENT      CORONARY ARTERY BYPASS GRAFT  2011    x3    CREATION OF FEMORAL-FEMORAL ARTERY BYPASS WITH GRAFT N/A 3/27/2019    Procedure: CREATION, BYPASS, ARTERIAL, FEMORAL TO FEMORAL, USING GRAFT - RIGHT TO LEFT;  Surgeon: Efren Fung MD;  Location: [facility] OR;  Service: Cardiothoracic;  Laterality: N/A;    ENDARTERECTOMY OF FEMORAL ARTERY Right 3/27/2019    Procedure: ENDARTERECTOMY, FEMORAL;  Surgeon: Efren Fung MD;  Location: [facility] OR;  Service: Cardiothoracic;  Laterality: Right;    EXPLORATION OF FEMORAL ARTERY Left 6/27/2019    Procedure: EXPLORATION, ARTERY, FEMORAL with repair;  Surgeon: Naresh Randolph MD;  Location: [facility] OR;  Service: Cardiovascular;  Laterality: Left;    FINGER SURGERY      amputation right middle finger    LEFT HEART CATHETERIZATION N/A 5/17/2023    Procedure: Left heart cath;  Surgeon: Ezequiel Peña MD;  Location: [facility] CATH;  Service: Cardiology;  Laterality: N/A;    LEFT HEART CATHETERIZATION Left 2/19/2024    Procedure: Left heart cath;  Surgeon: Ezequiel Peña MD;  Location: [facility] CATH;  Service: Cardiology;  Laterality: Left;    PERCUTANEOUS TRANSLUMINAL ANGIOPLASTY (PTA) OF PERIPHERAL VESSEL N/A 1/9/2019    Procedure: PTA, PERIPHERAL VESSEL;  Surgeon: Alex Pittman MD;  Location: [facility] CATH;  Service: Cardiology;  Laterality: N/A;  administer lytic therapy overnight on Wednesday with then plans to touch up other vessels Thursday morning January 10,2019    PERCUTANEOUS TRANSLUMINAL ANGIOPLASTY (PTA) OF PERIPHERAL VESSEL N/A 6/27/2019    Procedure: PTA, PERIPHERAL VESSEL;  Surgeon:  Alex Pittman MD;  Location: [facility] CATH;  Service: Cardiology;  Laterality: N/A;    PERCUTANEOUS TRANSLUMINAL ANGIOPLASTY (PTA) OF PERIPHERAL VESSEL N/A 1/30/2020    Procedure: PTA, PERIPHERAL VESSEL;  Surgeon: Alex Pittman MD;  Location: [facility] CATH;  Service: Cardiology;  Laterality: N/A;    REPLACEMENT OF IMPLANTABLE CARDIOVERTER-DEFIBRILLATOR (ICD) GENERATOR N/A 12/15/2021    Procedure: REPLACEMENT, ICD GENERATOR with possible upgrade to CRT;  Surgeon: Houston Pineda MD;  Location: [facility] CATH;  Service: Cardiology;  Laterality: N/A;    WOUND EXPLORATION Left 6/27/2019    Procedure: EXPLORATION, WOUND  Foreign body left femoral;  Surgeon: Naresh Randolph MD;  Location: [facility] OR;  Service: Cardiovascular;  Laterality: Left;     Family History   Problem Relation Name Age of Onset    Cancer Mother          bone    Heart attacks under age 50 Brother      Diabetes Daughter      Hypertension Father      Heart disease Father      Hyperlipidemia Father      Heart attacks under age 50 Paternal Grandfather       Social History     Tobacco Use    Smoking status: Every Day     Current packs/day: 1.00     Average packs/day: 1 pack/day for 46.7 years (46.7 ttl pk-yrs)     Types: Cigarettes     Start date: 1978    Smokeless tobacco: Never   Substance Use Topics    Alcohol use: Not Currently    Drug use: No     Review of Systems   Constitutional:  Negative for activity change, appetite change, fatigue, fever and unexpected weight change.   HENT:  Negative for congestion, ear pain, mouth sores, nosebleeds, rhinorrhea, sinus pressure, sneezing and sore throat.    Eyes:  Negative for pain, discharge, redness and itching.   Respiratory:  Negative for apnea, cough, chest tightness and shortness of breath.    Cardiovascular:  Negative for chest pain, palpitations and leg swelling.   Gastrointestinal:  Negative for abdominal distention, abdominal pain, anal bleeding, constipation, diarrhea, nausea and vomiting.   Endocrine:  Negative.    Genitourinary:  Negative for dysuria, enuresis, flank pain and frequency.   Musculoskeletal:  Negative for arthralgias, back pain, neck pain and neck stiffness.   Skin:  Positive for wound. Negative for color change.   Allergic/Immunologic: Negative.    Neurological:  Negative for dizziness, tremors, syncope, facial asymmetry, speech difficulty, weakness, light-headedness, numbness and headaches.   Hematological:  Negative for adenopathy. Does not bruise/bleed easily.   Psychiatric/Behavioral:  Negative for agitation, behavioral problems, hallucinations, self-injury and suicidal ideas. The patient is not nervous/anxious.        Physical Exam     Initial Vitals [08/31/24 1045]   BP Pulse Resp Temp SpO2   126/80 70 20 97.8 °F (36.6 °C) 99 %      MAP       --         Physical Exam    Nursing note and vitals reviewed.  Constitutional: Vital signs are normal. He appears well-developed and well-nourished. He is cooperative.   HENT:   Head: Normocephalic and atraumatic.   Mouth/Throat: Uvula is midline and mucous membranes are normal.   Eyes: Conjunctivae, EOM and lids are normal. Pupils are equal, round, and reactive to light.   Neck: Neck supple.   Normal range of motion.   Full passive range of motion without pain.     Cardiovascular:  Normal rate, regular rhythm, S1 normal, S2 normal, normal heart sounds, intact distal pulses and normal pulses.           Pulmonary/Chest: Effort normal and breath sounds normal.   Abdominal: Abdomen is soft and flat. Bowel sounds are normal.   Musculoskeletal:         General: Normal range of motion.      Cervical back: Full passive range of motion without pain, normal range of motion and neck supple.     Neurological: He is alert and oriented to person, place, and time. He has normal strength.   Skin: Skin is intact. Capillary refill takes less than 2 seconds.   2 centimeter left forearm abscess with local erythematous rim         ED Course   Procedures  I & D - Incision  and Drainage  -- Performed by: Bernard Jimenez M.D.  -- Date/Time: 11:54 AM 8/31/2024    -- Type: abscess  -- Location: Left forearm  -- Anesthesia: local infiltration  -- Local anesthetic: lidocaine 1%   -- Anesthetic total: 10 ml  -- Scalpel size: 11  -- Incision type: single straight  -- Complexity: simple  -- Drainage: pus  -- Drainage amount: Moderate  -- Wound treatment: incision  -- Packing material: 1/4 in gauze  -- Wound culture taken      Labs Reviewed   CULTURE, AEROBIC  (SPECIFY SOURCE)          Imaging Results    None          Medications   LIDOcaine (PF) 10 mg/ml (1%) injection 100 mg (100 mg Infiltration Given 8/31/24 1053)   Tdap (BOOSTRIX) vaccine injection 0.5 mL (0.5 mLs Intramuscular Given 8/31/24 1053)   mupirocin 2 % ointment ( Topical (Top) Given 8/31/24 1053)   sulfamethoxazole-trimethoprim 800-160mg per tablet 1 tablet (1 tablet Oral Given 8/31/24 1115)     Medical Decision Making  Differential includes abscess, insect bite, folliculitis, carbuncle, furuncle    Problems Addressed:  Abscess: complicated acute illness or injury    ED Management & Risks of Complication, Morbidity, & Mortality:  Incision & drainage performed in the emergency room this evening  Moderate pus expressed, wound irrigated & cleaned by ER MD  Packed with sterile gauze, patient given 1st dose of Bactrim here  Prescription of Bactrim plus pain medicine on ER discharge today  Clean 3 times a day with antibacterial soap, Bactroban afterwards  Return in 48 hours for wound check & packing removal on discharge  Pt/Family counseled to return to the ER with any concerns on DC    Need for Hospitalization or Surgery with Social Determinants of Health:  This patient does not need to be hospitalized on ER evaluation today  The patient's diagnosis is not limited by social determinants of health  Does not require surgery or procedure (major or minor), no risk factors    Clinical Impression:  Final diagnoses:  [L02.91] Abscess  (Primary)          ED Disposition Condition    Discharge Stable          ED Prescriptions       Medication Sig Dispense Start Date End Date Auth. Provider    mupirocin (BACTROBAN) 2 % ointment Apply topically 3 (three) times daily. for 10 days 15 g 8/31/2024 9/10/2024 Bernard Jimenez MD    sulfamethoxazole-trimethoprim 800-160mg (BACTRIM DS) 800-160 mg Tab Take 1 tablet by mouth 2 (two) times daily. for 7 days 14 tablet 8/31/2024 9/7/2024 Bernard Jimenez MD    HYDROcodone-acetaminophen (NORCO) 5-325 mg per tablet Take 1 tablet by mouth every 4 (four) hours as needed for Pain. 15 tablet 8/31/2024 9/3/2024 Bernard Jimenez MD          Follow-up Information       Follow up With Specialties Details Why Contact Info    Adela Gifford MD Internal Medicine Schedule an appointment as soon as possible for a visit in 2 days  [address] [place] [id number]  [phone]               Bernard Jimenez MD  08/31/24 [id number]

## 2024-08-31 NOTE — ED TRIAGE NOTES
58 y.o. male presents to ER ED 01/ED 01A   Chief Complaint   Patient presents with    Insect Bite   .   C/o possible insect bite to left forearm for two days, redness and swelling noted

## 2024-09-02 ENCOUNTER — HOSPITAL ENCOUNTER (EMERGENCY)
Facility: HOSPITAL | Age: 59
Discharge: HOME | End: 2024-09-02
Attending: SURGERY
Payer: MEDICARE

## 2024-09-02 VITALS
TEMPERATURE: 98 F | BODY MASS INDEX: 28.93 KG/M2 | WEIGHT: 180 LBS | HEIGHT: 66 IN | DIASTOLIC BLOOD PRESSURE: 71 MMHG | OXYGEN SATURATION: 100 % | SYSTOLIC BLOOD PRESSURE: 133 MMHG | HEART RATE: 65 BPM | RESPIRATION RATE: 20 BRPM

## 2024-09-02 DIAGNOSIS — Z51.89 WOUND CHECK, ABSCESS: Primary | ICD-10-CM

## 2024-09-02 PROCEDURE — 99282 EMERGENCY DEPT VISIT SF MDM: CPT | Mod: HCNC

## 2024-09-02 NOTE — ED TRIAGE NOTES
Pt was in ED Saturday morning for spider bite, abscess drained. Was told to return to ED for recheck.   Taking abx as rx'd

## 2024-09-02 NOTE — ED PROVIDER NOTES
Encounter Date: 9/2/2024       History     Chief Complaint   Patient presents with    Wound Check     Pt was in ED Saturday morning for spider bite, abscess drained. Was told to return to ED for recheck.   Taking abx as rx'd      History of Present Illness  Miles Currie is a 58 y.o. male that presents for a wound check this morning  Patient with incision & drainage with a left forearm abscess 2 days ago/Saturday  Patient presents for recheck, forearms looking much better on ER evaluation  Erythema is much less, cellulitis is last, no obvious purulent drainage on exam  Patient has been taking his antibiotics, local wound care with Bactroban as well    The history is provided by the patient.     Review of patient's allergies indicates:   Allergen Reactions    Fish oil Swelling    Gabapentin Swelling     Past Medical History:   Diagnosis Date    Angina pectoris     Anticoagulant long-term use     plavix and  pletal    Anxiety and depression     Bipolar disorder     Patient denies    CHF (congestive heart failure)     Chronic bronchitis     Coronary artery disease     Diabetes mellitus     Diabetes with neurologic complications     Dyslipidemia     Encounter for blood transfusion     GERD (gastroesophageal reflux disease)     History of claustrophobia     IF SOMEONE TOUCHES HIS NOSE    Hyperlipidemia     Hypertension     Irregular heartbeat     Ischemic cardiomyopathy     MI (myocardial infarction) 2009    multiple MI's    Multiple gastric ulcers     Pacemaker     PAD (peripheral artery disease)     Pneumonia     Polyneuropathy     Presence of automatic implantable cardioverter-defibrillator     PVD (peripheral vascular disease)     Respiratory failure     Sleep apnea     does not have machine     Tobacco dependence     Trouble in sleeping     Type 2 diabetes with peripheral circulatory disorder, controlled      Past Surgical History:   Procedure Laterality Date    ABLATION N/A 8/2/2023    Procedure: Ablation;   Surgeon: Ge Benedict MD;  Location: [facility] CATH;  Service: Cardiology;  Laterality: N/A;  *ICD*    ANGIOPLASTY Bilateral     right  and left iliac stents    AORTA - BILATERAL FEMORAL ARTERY BYPASS GRAFT  07/09/2013    APPENDECTOMY      ARTERIAL THROMBECTOMY Right 02/16/2012    iliac artery    ARTERIAL THROMBECTOMY Left 02/23/2012    brachial artery    CARDIAC DEFIBRILLATOR PLACEMENT Right 10/23/2012    [employer]    CORONARY ANGIOPLASTY WITH STENT PLACEMENT      CORONARY ARTERY BYPASS GRAFT  2011    x3    CREATION OF FEMORAL-FEMORAL ARTERY BYPASS WITH GRAFT N/A 3/27/2019    Procedure: CREATION, BYPASS, ARTERIAL, FEMORAL TO FEMORAL, USING GRAFT - RIGHT TO LEFT;  Surgeon: Efren Fung MD;  Location: [facility] OR;  Service: Cardiothoracic;  Laterality: N/A;    ENDARTERECTOMY OF FEMORAL ARTERY Right 3/27/2019    Procedure: ENDARTERECTOMY, FEMORAL;  Surgeon: Efren Fung MD;  Location: [facility] OR;  Service: Cardiothoracic;  Laterality: Right;    EXPLORATION OF FEMORAL ARTERY Left 6/27/2019    Procedure: EXPLORATION, ARTERY, FEMORAL with repair;  Surgeon: Naresh Randolph MD;  Location: [facility] OR;  Service: Cardiovascular;  Laterality: Left;    FINGER SURGERY      amputation right middle finger    LEFT HEART CATHETERIZATION N/A 5/17/2023    Procedure: Left heart cath;  Surgeon: Ezequiel Peña MD;  Location: [facility] CATH;  Service: Cardiology;  Laterality: N/A;    LEFT HEART CATHETERIZATION Left 2/19/2024    Procedure: Left heart cath;  Surgeon: Ezequiel Peña MD;  Location: [facility] CATH;  Service: Cardiology;  Laterality: Left;    PERCUTANEOUS TRANSLUMINAL ANGIOPLASTY (PTA) OF PERIPHERAL VESSEL N/A 1/9/2019    Procedure: PTA, PERIPHERAL VESSEL;  Surgeon: Alex Pittman MD;  Location: [facility] CATH;  Service: Cardiology;  Laterality: N/A;  administer lytic therapy overnight on Wednesday with then plans to touch up other vessels Thursday morning January 10,2019    PERCUTANEOUS TRANSLUMINAL ANGIOPLASTY (PTA) OF PERIPHERAL  VESSEL N/A 6/27/2019    Procedure: PTA, PERIPHERAL VESSEL;  Surgeon: Alex Pittman MD;  Location: [facility] CATH;  Service: Cardiology;  Laterality: N/A;    PERCUTANEOUS TRANSLUMINAL ANGIOPLASTY (PTA) OF PERIPHERAL VESSEL N/A 1/30/2020    Procedure: PTA, PERIPHERAL VESSEL;  Surgeon: Alex Pittman MD;  Location: [facility] CATH;  Service: Cardiology;  Laterality: N/A;    REPLACEMENT OF IMPLANTABLE CARDIOVERTER-DEFIBRILLATOR (ICD) GENERATOR N/A 12/15/2021    Procedure: REPLACEMENT, ICD GENERATOR with possible upgrade to CRT;  Surgeon: Houston Pineda MD;  Location: [facility] CATH;  Service: Cardiology;  Laterality: N/A;    WOUND EXPLORATION Left 6/27/2019    Procedure: EXPLORATION, WOUND  Foreign body left femoral;  Surgeon: Naresh Randolph MD;  Location: [facility] OR;  Service: Cardiovascular;  Laterality: Left;     Family History   Problem Relation Name Age of Onset    Cancer Mother          bone    Heart attacks under age 50 Brother      Diabetes Daughter      Hypertension Father      Heart disease Father      Hyperlipidemia Father      Heart attacks under age 50 Paternal Grandfather       Social History     Tobacco Use    Smoking status: Every Day     Current packs/day: 1.00     Average packs/day: 1 pack/day for 46.7 years (46.7 ttl pk-yrs)     Types: Cigarettes     Start date: 1978    Smokeless tobacco: Never   Substance Use Topics    Alcohol use: Not Currently    Drug use: No     Review of Systems   Constitutional:  Negative for activity change, appetite change, fatigue, fever and unexpected weight change.   HENT:  Negative for congestion, ear pain, mouth sores, nosebleeds, rhinorrhea, sinus pressure, sneezing and sore throat.    Eyes:  Negative for pain, discharge, redness and itching.   Respiratory:  Negative for apnea, cough, chest tightness and shortness of breath.    Cardiovascular:  Negative for chest pain, palpitations and leg swelling.   Gastrointestinal:  Negative for abdominal distention, abdominal pain, anal  bleeding, constipation, diarrhea, nausea and vomiting.   Endocrine: Negative.    Genitourinary:  Negative for dysuria, enuresis, flank pain and frequency.   Musculoskeletal:  Negative for arthralgias, back pain, neck pain and neck stiffness.   Skin:  Positive for wound. Negative for color change.   Allergic/Immunologic: Negative.    Neurological:  Negative for dizziness, tremors, syncope, facial asymmetry, speech difficulty, weakness, light-headedness, numbness and headaches.   Hematological:  Negative for adenopathy. Does not bruise/bleed easily.   Psychiatric/Behavioral:  Negative for agitation, behavioral problems, hallucinations, self-injury and suicidal ideas. The patient is not nervous/anxious.        Physical Exam     Initial Vitals [09/02/24 0739]   BP Pulse Resp Temp SpO2   133/71 65 20 97.7 °F (36.5 °C) 100 %      MAP       --         Physical Exam    Nursing note and vitals reviewed.  Constitutional: Vital signs are normal. He appears well-developed and well-nourished. He is cooperative.   HENT:   Head: Normocephalic and atraumatic.   Mouth/Throat: Uvula is midline and mucous membranes are normal.   Eyes: Conjunctivae, EOM and lids are normal. Pupils are equal, round, and reactive to light.   Neck: Neck supple.   Normal range of motion.   Full passive range of motion without pain.     Cardiovascular:  Normal rate, regular rhythm, S1 normal, S2 normal, normal heart sounds, intact distal pulses and normal pulses.           Pulmonary/Chest: Effort normal and breath sounds normal.   Abdominal: Abdomen is soft and flat. Bowel sounds are normal.   Musculoskeletal:         General: Normal range of motion.      Cervical back: Full passive range of motion without pain, normal range of motion and neck supple.     Neurological: He is alert and oriented to person, place, and time. He has normal strength.   Skin: Skin is intact. Capillary refill takes less than 2 seconds.   (+) healing left forearm incision & drainage  site with no purulence  Less forearm erythema, minimal edema on ER evaluation today         ED Course   Procedures  Labs Reviewed - No data to display       Imaging Results    None          Medications - No data to display    Medical Decision Making  The only diagnosis in the differential is wound check    Problems Addressed:  Wound check, abscess: complicated acute illness or injury    ED Management & Risks of Complication, Morbidity, & Mortality:  Packing removed with good granulation tissue noted  Forearm looks much better on clinical evaluation this morning  Repacked with 1/4 sterile iodoform gauze on discharge  Continue to pain 3 times a day with Bactroban afterwards  Continue antibiotics, elevate arm at home as needed for edema  Follow-up in next 3 to 4 days for additional wound check in ED  Pt/Family counseled to return to the ER with any concerns on DC    Need for Hospitalization or Surgery with Social Determinants of Health:  This patient does not need to be hospitalized on ER evaluation today  The patient's diagnosis is not limited by social determinants of health  Does not require surgery or procedure (major or minor), no risk factors    Clinical Impression:  Final diagnoses:  [Z51.89] Wound check, abscess (Primary)          ED Disposition Condition    Discharge Stable          ED Prescriptions    None       Follow-up Information       Follow up With Specialties Details Why Contact Info    Adela Gifford MD Internal Medicine Schedule an appointment as soon as possible for a visit in 2 days  [address] [address]  [phone]               Bernard Jimenez MD  09/02/24 4027

## 2024-09-03 ENCOUNTER — PATIENT OUTREACH (OUTPATIENT)
Dept: EMERGENCY MEDICINE | Facility: HOSPITAL | Age: 59
End: 2024-09-03
Payer: MEDICARE

## 2024-09-03 LAB — BACTERIA SPEC AEROBE CULT: ABNORMAL

## 2024-09-04 NOTE — PROGRESS NOTES
Pt is unreachable after 2 attempts. Encounter will be closed.    Shreya Justin [facility] [place]  [phone]

## 2024-09-05 ENCOUNTER — OUTPATIENT CASE MANAGEMENT (OUTPATIENT)
Dept: ADMINISTRATIVE | Facility: OTHER | Age: 59
End: 2024-09-05
Payer: MEDICARE

## 2024-09-06 ENCOUNTER — OFFICE VISIT (OUTPATIENT)
Dept: INTERNAL MEDICINE | Facility: CLINIC | Age: 59
End: 2024-09-06
Payer: MEDICARE

## 2024-09-06 VITALS
BODY MASS INDEX: 29.05 KG/M2 | WEIGHT: 180.75 LBS | DIASTOLIC BLOOD PRESSURE: 84 MMHG | OXYGEN SATURATION: 98 % | HEIGHT: 66 IN | RESPIRATION RATE: 18 BRPM | HEART RATE: 80 BPM | SYSTOLIC BLOOD PRESSURE: 136 MMHG

## 2024-09-06 DIAGNOSIS — L02.91 ABSCESS: ICD-10-CM

## 2024-09-06 DIAGNOSIS — Z51.89 ENCOUNTER FOR WOUND CARE: ICD-10-CM

## 2024-09-06 DIAGNOSIS — Z22.322 MRSA (METHICILLIN RESISTANT STAPH AUREUS) CULTURE POSITIVE: ICD-10-CM

## 2024-09-06 PROCEDURE — 99999 PR PBB SHADOW E&M-EST. PATIENT-LVL V: CPT | Mod: PBBFAC,HCNC,, | Performed by: NURSE PRACTITIONER

## 2024-09-06 NOTE — PROGRESS NOTES
Subjective:       Patient ID: Miles Currie is a 58 y.o. male.    Chief Complaint: Follow-up (X ER- spider bite L. Forearm PS:8)    Patient is unknown, to me and presents with   Chief Complaint   Patient presents with    Follow-up     X ER- spider bite L. Forearm PS:8   .  Denies chest pain and shortness of breath.  Patient who is unknown to me but known to fellow provider presents with repacking of abscess to left forearm. Has been twice to ER one for the incision and drainage and second for repacking. He is still on antibx and culture came back MRSA. He is still on antibx        Follow-up  Pertinent negatives include no rash.     Review of Systems   Constitutional: Negative.    Respiratory: Negative.     Cardiovascular: Negative.    Skin:  Positive for wound. Negative for color change, pallor and rash.   Hematological:  Negative for adenopathy.       Objective:      Physical Exam  Constitutional:       General: He is not in acute distress.     Appearance: Normal appearance. He is not ill-appearing, toxic-appearing or diaphoretic.   Cardiovascular:      Rate and Rhythm: Normal rate and regular rhythm.      Heart sounds: Normal heart sounds. No murmur heard.  Pulmonary:      Effort: Pulmonary effort is normal. No respiratory distress.      Breath sounds: Normal breath sounds. No stridor. No wheezing, rhonchi or rales.   Chest:      Chest wall: No tenderness.   Skin:     General: Skin is warm and dry.      Capillary Refill: Capillary refill takes less than 2 seconds.      Coloration: Skin is not jaundiced or pale.      Findings: Wound present. No bruising, erythema, lesion or rash.             Comments: Packing removed with serosanguinous fluid noted to gauze. Wound bed irrigated with normal saline. Applied packing and tolerated well. No longer with erythema or warmth.    Neurological:      Mental Status: He is alert.         Assessment:       1. MRSA (methicillin resistant staph aureus) culture positive    2.  "Abscess    3. Encounter for wound care        Plan:   1. MRSA (methicillin resistant staph aureus) culture positive    2. Abscess    3. Encounter for wound care    "This note will not be shared with the patient."     Finish antibx     Follow up on Monday with Dr. Gifford for repacking    Keep area clean    Rtc as scheduled    "

## 2024-09-09 ENCOUNTER — OFFICE VISIT (OUTPATIENT)
Dept: INTERNAL MEDICINE | Facility: CLINIC | Age: 59
End: 2024-09-09
Payer: MEDICARE

## 2024-09-09 VITALS
HEIGHT: 66 IN | BODY MASS INDEX: 28.67 KG/M2 | WEIGHT: 178.38 LBS | SYSTOLIC BLOOD PRESSURE: 122 MMHG | DIASTOLIC BLOOD PRESSURE: 64 MMHG | RESPIRATION RATE: 18 BRPM

## 2024-09-09 DIAGNOSIS — A49.02 MRSA INFECTION: Primary | ICD-10-CM

## 2024-09-09 DIAGNOSIS — I73.9 PAD (PERIPHERAL ARTERY DISEASE): ICD-10-CM

## 2024-09-09 DIAGNOSIS — Z79.4 TYPE 2 DIABETES MELLITUS WITH OTHER CIRCULATORY COMPLICATION, WITH LONG-TERM CURRENT USE OF INSULIN: ICD-10-CM

## 2024-09-09 DIAGNOSIS — I25.119 CORONARY ARTERY DISEASE INVOLVING NATIVE CORONARY ARTERY OF NATIVE HEART WITH ANGINA PECTORIS: ICD-10-CM

## 2024-09-09 DIAGNOSIS — E11.59 TYPE 2 DIABETES MELLITUS WITH OTHER CIRCULATORY COMPLICATION, WITH LONG-TERM CURRENT USE OF INSULIN: ICD-10-CM

## 2024-09-09 DIAGNOSIS — N52.9 ERECTILE DYSFUNCTION, UNSPECIFIED ERECTILE DYSFUNCTION TYPE: ICD-10-CM

## 2024-09-09 DIAGNOSIS — Z12.5 PROSTATE CANCER SCREENING: ICD-10-CM

## 2024-09-09 DIAGNOSIS — I50.42 CHRONIC COMBINED SYSTOLIC AND DIASTOLIC HEART FAILURE: ICD-10-CM

## 2024-09-09 DIAGNOSIS — I47.29 NSVT (NONSUSTAINED VENTRICULAR TACHYCARDIA): ICD-10-CM

## 2024-09-09 PROCEDURE — G2211 COMPLEX E/M VISIT ADD ON: HCPCS | Mod: HCNC,S$GLB,, | Performed by: INTERNAL MEDICINE

## 2024-09-09 PROCEDURE — 99999 PR PBB SHADOW E&M-EST. PATIENT-LVL IV: CPT | Mod: PBBFAC,HCNC,, | Performed by: INTERNAL MEDICINE

## 2024-09-09 PROCEDURE — 99215 OFFICE O/P EST HI 40 MIN: CPT | Mod: HCNC,S$GLB,, | Performed by: INTERNAL MEDICINE

## 2024-09-09 PROCEDURE — 3074F SYST BP LT 130 MM HG: CPT | Mod: HCNC,CPTII,S$GLB, | Performed by: INTERNAL MEDICINE

## 2024-09-09 PROCEDURE — 1160F RVW MEDS BY RX/DR IN RCRD: CPT | Mod: HCNC,CPTII,S$GLB, | Performed by: INTERNAL MEDICINE

## 2024-09-09 PROCEDURE — 1159F MED LIST DOCD IN RCRD: CPT | Mod: HCNC,CPTII,S$GLB, | Performed by: INTERNAL MEDICINE

## 2024-09-09 PROCEDURE — 3078F DIAST BP <80 MM HG: CPT | Mod: HCNC,CPTII,S$GLB, | Performed by: INTERNAL MEDICINE

## 2024-09-09 PROCEDURE — 4010F ACE/ARB THERAPY RXD/TAKEN: CPT | Mod: HCNC,CPTII,S$GLB, | Performed by: INTERNAL MEDICINE

## 2024-09-09 PROCEDURE — 3046F HEMOGLOBIN A1C LEVEL >9.0%: CPT | Mod: HCNC,CPTII,S$GLB, | Performed by: INTERNAL MEDICINE

## 2024-09-09 PROCEDURE — 3008F BODY MASS INDEX DOCD: CPT | Mod: HCNC,CPTII,S$GLB, | Performed by: INTERNAL MEDICINE
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
## 2024-09-12 ENCOUNTER — TELEPHONE (OUTPATIENT)
Dept: FAMILY MEDICINE | Facility: CLINIC | Age: 59
End: 2024-09-12
Payer: MEDICARE

## 2024-09-12 NOTE — TELEPHONE ENCOUNTER
Pt was scheduled for 9/13/2024 at 9:15. Clinic will be closed until 10. Pt was called to be r/s. Originally schedule was 10-11:30 and fully booked. Pt was offered Wednesday 9/18 at 8:15. Pt states his packing is feeling tight. PAR sent pt to nurse to speak with. While speaking with pt and notifying him her recommendations were to go to ER if not being able to be seen in clinic, pt declined and said he would wait. Offered another provider in clinic, but pt refused. While talking with pt, Dr. Alejandre schedule was opened through lunch and pt was offered 11:45 appt. Pt declined and said he can only come in am. Again recommended he be seen in ER if not able to come to clinic later in the day. Pt declined and said that he wants to wait until Wednesday 9/18 at 8:15. Appt scheduled.

## 2024-09-15 ENCOUNTER — HOSPITAL ENCOUNTER (EMERGENCY)
Facility: HOSPITAL | Age: 59
Discharge: HOME | End: 2024-09-15
Attending: FAMILY MEDICINE
Payer: MEDICARE

## 2024-09-15 VITALS
WEIGHT: 183 LBS | TEMPERATURE: 98 F | HEART RATE: 64 BPM | BODY MASS INDEX: 29.53 KG/M2 | OXYGEN SATURATION: 98 % | DIASTOLIC BLOOD PRESSURE: 73 MMHG | SYSTOLIC BLOOD PRESSURE: 129 MMHG | RESPIRATION RATE: 18 BRPM

## 2024-09-15 DIAGNOSIS — Z09 ENCOUNTER FOR RECHECK OF ABSCESS FOLLOWING INCISION AND DRAINAGE: Primary | ICD-10-CM

## 2024-09-15 PROCEDURE — 25000003 PHARM REV CODE 250: Mod: HCNC | Performed by: NURSE PRACTITIONER

## 2024-09-15 PROCEDURE — 99283 EMERGENCY DEPT VISIT LOW MDM: CPT | Mod: HCNC

## 2024-09-15 RX ORDER — MUPIROCIN 20 MG/G
1 OINTMENT TOPICAL
Status: COMPLETED | OUTPATIENT
Start: 2024-09-15 | End: 2024-09-15

## 2024-09-15 RX ADMIN — MUPIROCIN 1 TUBE: 20 OINTMENT TOPICAL at 10:09

## 2024-09-15 NOTE — ED TRIAGE NOTES
58 y.o. male presents to ER Room/bed info not found   Chief Complaint   Patient presents with    Wound Check   .   Here for wound check and repacking to left forearm wound
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
## 2024-09-16 ENCOUNTER — PATIENT OUTREACH (OUTPATIENT)
Dept: EMERGENCY MEDICINE | Facility: HOSPITAL | Age: 59
End: 2024-09-16
Payer: MEDICARE

## 2024-09-16 NOTE — PROGRESS NOTES
Patient declined ED navigation assessment and denied any needs at this time. Pt already made his own appt.    Shreya Pablo  ED Navigator  [phone]

## 2024-09-16 NOTE — PROGRESS NOTES
Pt was reminded about and will be attending his appointment on Wednesday with Adela Gifford MD @ 8:15 a.m.    Shreya Pablo  ED Navigator  [phone]

## 2024-09-17 ENCOUNTER — LAB VISIT (OUTPATIENT)
Dept: LAB | Facility: HOSPITAL | Age: 59
End: 2024-09-17
Attending: INTERNAL MEDICINE
Payer: MEDICARE

## 2024-09-17 DIAGNOSIS — Z12.5 PROSTATE CANCER SCREENING: ICD-10-CM

## 2024-09-17 DIAGNOSIS — Z79.4 TYPE 2 DIABETES MELLITUS WITH OTHER CIRCULATORY COMPLICATION, WITH LONG-TERM CURRENT USE OF INSULIN: ICD-10-CM

## 2024-09-17 DIAGNOSIS — I25.119 CORONARY ARTERY DISEASE INVOLVING NATIVE CORONARY ARTERY OF NATIVE HEART WITH ANGINA PECTORIS: ICD-10-CM

## 2024-09-17 DIAGNOSIS — E11.59 TYPE 2 DIABETES MELLITUS WITH OTHER CIRCULATORY COMPLICATION, WITH LONG-TERM CURRENT USE OF INSULIN: ICD-10-CM

## 2024-09-17 LAB
ALBUMIN SERPL BCP-MCNC: 3.8 G/DL (ref 3.5–5.2)
ALP SERPL-CCNC: 122 U/L (ref 55–135)
ALT SERPL W/O P-5'-P-CCNC: 26 U/L (ref 10–44)
ANION GAP SERPL CALC-SCNC: 10 MMOL/L (ref 8–16)
AST SERPL-CCNC: 20 U/L (ref 10–40)
BASOPHILS # BLD AUTO: 0.06 K/UL (ref 0–0.2)
BASOPHILS NFR BLD: 0.8 % (ref 0–1.9)
BILIRUB SERPL-MCNC: 0.5 MG/DL (ref 0.1–1)
BUN SERPL-MCNC: 12 MG/DL (ref 6–20)
CALCIUM SERPL-MCNC: 9.3 MG/DL (ref 8.7–10.5)
CHLORIDE SERPL-SCNC: 106 MMOL/L (ref 95–110)
CHOLEST SERPL-MCNC: 130 MG/DL (ref 120–199)
CHOLEST/HDLC SERPL: 3.5 {RATIO} (ref 2–5)
CO2 SERPL-SCNC: 25 MMOL/L (ref 23–29)
COMPLEXED PSA SERPL-MCNC: 0.94 NG/ML (ref 0–4)
CREAT SERPL-MCNC: 1.2 MG/DL (ref 0.5–1.4)
DIFFERENTIAL METHOD BLD: ABNORMAL
EOSINOPHIL # BLD AUTO: 0.2 K/UL (ref 0–0.5)
EOSINOPHIL NFR BLD: 3.2 % (ref 0–8)
ERYTHROCYTE [DISTWIDTH] IN BLOOD BY AUTOMATED COUNT: 14.2 % (ref 11.5–14.5)
EST. GFR  (NO RACE VARIABLE): >60 ML/MIN/1.73 M^2
ESTIMATED AVG GLUCOSE: 217 MG/DL (ref 68–131)
GLUCOSE SERPL-MCNC: 156 MG/DL (ref 70–110)
HBA1C MFR BLD: 9.2 % (ref 4–5.6)
HCT VFR BLD AUTO: 49.7 % (ref 40–54)
HDLC SERPL-MCNC: 37 MG/DL (ref 40–75)
HDLC SERPL: 28.5 % (ref 20–50)
HGB BLD-MCNC: 15.7 G/DL (ref 14–18)
IMM GRANULOCYTES # BLD AUTO: 0.04 K/UL (ref 0–0.04)
IMM GRANULOCYTES NFR BLD AUTO: 0.5 % (ref 0–0.5)
LDLC SERPL CALC-MCNC: 50.2 MG/DL (ref 63–159)
LYMPHOCYTES # BLD AUTO: 1.2 K/UL (ref 1–4.8)
LYMPHOCYTES NFR BLD: 16.3 % (ref 18–48)
MCH RBC QN AUTO: 30.9 PG (ref 27–31)
MCHC RBC AUTO-ENTMCNC: 31.6 G/DL (ref 32–36)
MCV RBC AUTO: 98 FL (ref 82–98)
MONOCYTES # BLD AUTO: 0.5 K/UL (ref 0.3–1)
MONOCYTES NFR BLD: 6.9 % (ref 4–15)
NEUTROPHILS # BLD AUTO: 5.3 K/UL (ref 1.8–7.7)
NEUTROPHILS NFR BLD: 72.3 % (ref 38–73)
NONHDLC SERPL-MCNC: 93 MG/DL
NRBC BLD-RTO: 0 /100 WBC
PLATELET # BLD AUTO: 178 K/UL (ref 150–450)
PMV BLD AUTO: 9.6 FL (ref 9.2–12.9)
POTASSIUM SERPL-SCNC: 4.1 MMOL/L (ref 3.5–5.1)
PROT SERPL-MCNC: 7.5 G/DL (ref 6–8.4)
RBC # BLD AUTO: 5.08 M/UL (ref 4.6–6.2)
SODIUM SERPL-SCNC: 141 MMOL/L (ref 136–145)
T4 FREE SERPL-MCNC: 1.16 NG/DL (ref 0.71–1.51)
TRIGL SERPL-MCNC: 214 MG/DL (ref 30–150)
TSH SERPL DL<=0.005 MIU/L-ACNC: 2.9 UIU/ML (ref 0.4–4)
WBC # BLD AUTO: 7.29 K/UL (ref 3.9–12.7)

## 2024-09-17 PROCEDURE — 84153 ASSAY OF PSA TOTAL: CPT | Mod: HCNC | Performed by: INTERNAL MEDICINE

## 2024-09-17 PROCEDURE — 80053 COMPREHEN METABOLIC PANEL: CPT | Mod: HCNC | Performed by: INTERNAL MEDICINE

## 2024-09-17 PROCEDURE — 83036 HEMOGLOBIN GLYCOSYLATED A1C: CPT | Mod: HCNC | Performed by: INTERNAL MEDICINE

## 2024-09-17 PROCEDURE — 84443 ASSAY THYROID STIM HORMONE: CPT | Mod: HCNC | Performed by: INTERNAL MEDICINE

## 2024-09-17 PROCEDURE — 84439 ASSAY OF FREE THYROXINE: CPT | Mod: HCNC | Performed by: INTERNAL MEDICINE

## 2024-09-17 PROCEDURE — 36415 COLL VENOUS BLD VENIPUNCTURE: CPT | Mod: HCNC | Performed by: INTERNAL MEDICINE

## 2024-09-17 PROCEDURE — 85025 COMPLETE CBC W/AUTO DIFF WBC: CPT | Mod: HCNC | Performed by: INTERNAL MEDICINE

## 2024-09-17 PROCEDURE — 80061 LIPID PANEL: CPT | Mod: HCNC | Performed by: INTERNAL MEDICINE

## 2024-09-18 ENCOUNTER — OFFICE VISIT (OUTPATIENT)
Dept: INTERNAL MEDICINE | Facility: CLINIC | Age: 59
End: 2024-09-18
Payer: MEDICARE

## 2024-09-18 VITALS
OXYGEN SATURATION: 98 % | HEART RATE: 70 BPM | RESPIRATION RATE: 20 BRPM | WEIGHT: 185.19 LBS | HEIGHT: 66 IN | SYSTOLIC BLOOD PRESSURE: 126 MMHG | DIASTOLIC BLOOD PRESSURE: 60 MMHG | BODY MASS INDEX: 29.76 KG/M2

## 2024-09-18 DIAGNOSIS — Z51.89 WOUND CHECK, ABSCESS: Primary | ICD-10-CM

## 2024-09-18 PROCEDURE — 1159F MED LIST DOCD IN RCRD: CPT | Mod: HCNC,CPTII,S$GLB, | Performed by: INTERNAL MEDICINE

## 2024-09-18 PROCEDURE — 3074F SYST BP LT 130 MM HG: CPT | Mod: HCNC,CPTII,S$GLB, | Performed by: INTERNAL MEDICINE

## 2024-09-18 PROCEDURE — 3046F HEMOGLOBIN A1C LEVEL >9.0%: CPT | Mod: HCNC,CPTII,S$GLB, | Performed by: INTERNAL MEDICINE

## 2024-09-18 PROCEDURE — 99999 PR PBB SHADOW E&M-EST. PATIENT-LVL III: CPT | Mod: PBBFAC,HCNC,, | Performed by: INTERNAL MEDICINE

## 2024-09-18 PROCEDURE — 99213 OFFICE O/P EST LOW 20 MIN: CPT | Mod: HCNC,S$GLB,, | Performed by: INTERNAL MEDICINE

## 2024-09-18 PROCEDURE — 3008F BODY MASS INDEX DOCD: CPT | Mod: HCNC,CPTII,S$GLB, | Performed by: INTERNAL MEDICINE

## 2024-09-18 PROCEDURE — 4010F ACE/ARB THERAPY RXD/TAKEN: CPT | Mod: HCNC,CPTII,S$GLB, | Performed by: INTERNAL MEDICINE

## 2024-09-18 PROCEDURE — 3078F DIAST BP <80 MM HG: CPT | Mod: HCNC,CPTII,S$GLB, | Performed by: INTERNAL MEDICINE

## 2024-09-18 PROCEDURE — G2211 COMPLEX E/M VISIT ADD ON: HCPCS | Mod: HCNC,S$GLB,, | Performed by: INTERNAL MEDICINE

## 2024-09-18 RX ORDER — TADALAFIL 20 MG/1
20 TABLET ORAL DAILY
COMMUNITY
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
## 2024-09-20 ENCOUNTER — OFFICE VISIT (OUTPATIENT)
Dept: UROLOGY | Facility: CLINIC | Age: 59
End: 2024-09-20
Attending: SPECIALIST
Payer: MEDICARE

## 2024-09-20 VITALS
HEIGHT: 66 IN | WEIGHT: 183.88 LBS | SYSTOLIC BLOOD PRESSURE: 116 MMHG | DIASTOLIC BLOOD PRESSURE: 74 MMHG | BODY MASS INDEX: 29.55 KG/M2

## 2024-09-20 DIAGNOSIS — N52.01 ERECTILE DYSFUNCTION DUE TO ARTERIAL INSUFFICIENCY: ICD-10-CM

## 2024-09-20 PROCEDURE — 99999 PR PBB SHADOW E&M-EST. PATIENT-LVL III: CPT | Mod: PBBFAC,HCNC,, | Performed by: SPECIALIST

## 2024-09-20 NOTE — PROGRESS NOTES
[facility] - Urology   Clinic Note    SUBJECTIVE:     Chief Complaint   Patient presents with    Erectile Dysfunction       Referral from: Adela Gifford MD.    History of Present Illness:  Miles Currie is a 58 y.o. male who presents to clinic for ED.    Pleasant 58-year-old gentleman referred for ED. he has tried Viagra and Cialis in the past.  He had moderate modest improvement however he states that he has a young girlfriend and he would like to be more robust.  I note from his past medical history he has significant cardiovascular disease.  Apparently he is no longer taking isosorbide mononitrate.  He denies taking sublingual nitrates.  He reports that his libido was normal.  I will refrain from checking a testosterone level at this point.  PSA is normal at 0.94.    Patient endorses no additional complaints at this time.    Past Medical History:   Diagnosis Date    Angina pectoris     Anticoagulant long-term use     plavix and  pletal    Anxiety and depression     Bipolar disorder     Patient denies    CHF (congestive heart failure)     Chronic bronchitis     Coronary artery disease     Diabetes mellitus     Diabetes with neurologic complications     Dyslipidemia     Encounter for blood transfusion     GERD (gastroesophageal reflux disease)     History of claustrophobia     IF SOMEONE TOUCHES HIS NOSE    Hyperlipidemia     Hypertension     Irregular heartbeat     Ischemic cardiomyopathy     MI (myocardial infarction) 2009    multiple MI's    Multiple gastric ulcers     Pacemaker     PAD (peripheral artery disease)     Pneumonia     Polyneuropathy     Presence of automatic implantable cardioverter-defibrillator     PVD (peripheral vascular disease)     Respiratory failure     Sleep apnea     does not have machine     Tobacco dependence     Trouble in sleeping     Type 2 diabetes with peripheral circulatory disorder, controlled        Past Surgical History:   Procedure Laterality Date    ABLATION  N/A 8/2/2023    Procedure: Ablation;  Surgeon: Ge Benedict MD;  Location: [facility] CATH;  Service: Cardiology;  Laterality: N/A;  *ICD*    ANGIOPLASTY Bilateral     right  and left iliac stents    AORTA - BILATERAL FEMORAL ARTERY BYPASS GRAFT  07/09/2013    APPENDECTOMY      ARTERIAL THROMBECTOMY Right 02/16/2012    iliac artery    ARTERIAL THROMBECTOMY Left 02/23/2012    brachial artery    CARDIAC DEFIBRILLATOR PLACEMENT Right 10/23/2012    [employer]    CORONARY ANGIOPLASTY WITH STENT PLACEMENT      CORONARY ARTERY BYPASS GRAFT  2011    x3    CREATION OF FEMORAL-FEMORAL ARTERY BYPASS WITH GRAFT N/A 3/27/2019    Procedure: CREATION, BYPASS, ARTERIAL, FEMORAL TO FEMORAL, USING GRAFT - RIGHT TO LEFT;  Surgeon: Efren Fung MD;  Location: [facility] OR;  Service: Cardiothoracic;  Laterality: N/A;    ENDARTERECTOMY OF FEMORAL ARTERY Right 3/27/2019    Procedure: ENDARTERECTOMY, FEMORAL;  Surgeon: Efren Fung MD;  Location: [facility] OR;  Service: Cardiothoracic;  Laterality: Right;    EXPLORATION OF FEMORAL ARTERY Left 6/27/2019    Procedure: EXPLORATION, ARTERY, FEMORAL with repair;  Surgeon: Naresh Randolph MD;  Location: [facility] OR;  Service: Cardiovascular;  Laterality: Left;    FINGER SURGERY      amputation right middle finger    LEFT HEART CATHETERIZATION N/A 5/17/2023    Procedure: Left heart cath;  Surgeon: Ezequiel Peña MD;  Location: [facility] CATH;  Service: Cardiology;  Laterality: N/A;    LEFT HEART CATHETERIZATION Left 2/19/2024    Procedure: Left heart cath;  Surgeon: Ezequiel Peña MD;  Location: [facility] CATH;  Service: Cardiology;  Laterality: Left;    PERCUTANEOUS TRANSLUMINAL ANGIOPLASTY (PTA) OF PERIPHERAL VESSEL N/A 1/9/2019    Procedure: PTA, PERIPHERAL VESSEL;  Surgeon: Alex Pittman MD;  Location: [facility] CATH;  Service: Cardiology;  Laterality: N/A;  administer lytic therapy overnight on Wednesday with then plans to touch up other vessels Thursday morning January 10,2019    PERCUTANEOUS TRANSLUMINAL  ANGIOPLASTY (PTA) OF PERIPHERAL VESSEL N/A 6/27/2019    Procedure: PTA, PERIPHERAL VESSEL;  Surgeon: Alex Pittman MD;  Location: [facility] at [facility] CATH;  Service: Cardiology;  Laterality: N/A;    PERCUTANEOUS TRANSLUMINAL ANGIOPLASTY (PTA) OF PERIPHERAL VESSEL N/A 1/30/2020    Procedure: PTA, PERIPHERAL VESSEL;  Surgeon: Alex Pittman MD;  Location: [facility] beds at the [facility] CATH;  Service: Cardiology;  Laterality: N/A;    REPLACEMENT OF IMPLANTABLE CARDIOVERTER-DEFIBRILLATOR (ICD) GENERATOR N/A 12/15/2021    Procedure: REPLACEMENT, ICD GENERATOR with possible upgrade to CRT;  Surgeon: Houston Pineda MD;  Location: [facility] beds at the [facility] CATH;  Service: Cardiology;  Laterality: N/A;    WOUND EXPLORATION Left 6/27/2019    Procedure: EXPLORATION, WOUND  Foreign body left femoral;  Surgeon: Naresh Randolph MD;  Location: [facility] beds at the [facility] OR;  Service: Cardiovascular;  Laterality: Left;       Family History   Problem Relation Name Age of Onset    Cancer Mother          bone    Heart attacks under age 50 Brother      Diabetes Daughter      Hypertension Father      Heart disease Father      Hyperlipidemia Father      Heart attacks under age 50 Paternal Grandfather         Social History     Tobacco Use    Smoking status: Every Day     Current packs/day: 1.00     Average packs/day: 1 pack/day for 46.7 years (46.7 ttl pk-yrs)     Types: Cigarettes     Start date: 1978     Passive exposure: Current    Smokeless tobacco: Never   Substance Use Topics    Alcohol use: Not Currently    Drug use: No       Current Outpatient Medications on File Prior to Visit   Medication Sig Dispense Refill    albuterol sulfate 90 mcg/actuation aebs Inhale 180 mcg into the lungs every 4 to 6 hours as needed.      alirocumab (PRALUENT PEN) 75 mg/mL PnIj Praluent Pen 75 mg/mL subcutaneous pen injector, [RxNorm: 0584356]      ALPRAZolam (XANAX) 0.25 MG tablet Take 0.25 mg by mouth daily as needed.      amiodarone (PACERONE) 200 MG Tab Take 200 mg by mouth once daily.      budesonide-formoterol 160-4.5  "mcg (SYMBICORT) 160-4.5 mcg/actuation HFAA Inhale 2 puffs into the lungs every 12 (twelve) hours. Controller      carvediloL (COREG) 6.25 MG tablet Take 6.25 mg by mouth 2 (two) times daily.      clopidogrel (PLAVIX) 75 mg tablet Take 75 mg by mouth once daily.      dulaglutide (TRULICITY) 3 mg/0.5 mL pen injector Inject 3 mg into the skin every 7 days. 4 pen 11    ELIQUIS 5 mg Tab Take 1 tablet (5 mg total) by mouth 2 (two) times daily. 60 tablet 6    evolocumab (REPATHA SURECLICK) 140 mg/mL PnIj Inject 140 mg into the skin every 14 (fourteen) days.      fenofibrate micronized (LOFIBRA) 134 MG Cap Take 134 mg by mouth once daily.      insulin (LANTUS SOLOSTAR U-100 INSULIN) glargine 100 units/mL SubQ pen Inject 40 Units into the skin 2 (two) times a day. 72 mL 3    insulin syringe-needle U-100 1 mL 29 gauge x 1/2" Syrg BID 10 each 0    JARDIANCE 10 mg tablet Take 10 mg by mouth.      pantoprazole (PROTONIX) 40 MG tablet Take 40 mg by mouth once daily.      pen needle, diabetic (BD ULTRA-FINE SHORT PEN NEEDLE) 31 gauge x 5/16" Ndle USE AS DIRECTED TWICE DAILY 100 each 1    potassium chloride (K-TAB) 20 mEq Take 20 mEq by mouth Daily.      rosuvastatin (CRESTOR) 40 MG Tab Take 40 mg by mouth once daily.      sacubitriL-valsartan (ENTRESTO) 24-26 mg per tablet Entresto 24 mg-26 mg tablet, [RxNorm: 6022332]      tadalafiL (CIALIS) 20 MG Tab Take 20 mg by mouth once daily.      torsemide (DEMADEX) 20 MG Tab Take 10 mg by mouth daily as needed.      TRUE METRIX GLUCOSE METER Misc CHECK BS BID      TRUE METRIX GLUCOSE TEST STRIP Strp CHECK BS BID      TRUEPLUS LANCETS 33 gauge Misc CHECK BS BID      buPROPion (WELLBUTRIN SR) 150 MG TBSR 12 hr tablet Wellbutrin  mg tablet, 12 hr sustained-release, [RxNorm: 752549] (Patient not taking: Reported on 9/18/2024)      ezetimibe (ZETIA) 10 mg tablet ezetimibe 10 mg tablet, [RxNorm: 730616] (Patient not taking: Reported on 9/18/2024)      isosorbide mononitrate (IMDUR) 30 MG " "24 hr tablet isosorbide mononitrate ER 30 mg tablet,extended release 24 hr, [RxNorm: 106739] (Patient not taking: Reported on 9/18/2024)      mexiletine (MEXITIL) 150 MG Cap Take 1 capsule (150 mg total) by mouth 3 (three) times daily. 90 capsule 11    spironolactone (ALDACTONE) 25 MG tablet spironolactone 25 mg tablet, [RxNorm: 281169] (Patient not taking: Reported on 9/18/2024)       No current facility-administered medications on file prior to visit.       Review of patient's allergies indicates:   Allergen Reactions    Fish oil Swelling    Gabapentin Swelling       Review of Systems   All other systems reviewed and are negative.       Review of Systems:  A review of 10+ systems was conducted with pertinent positive and negative findings documented in HPI with all other systems reviewed and negative.    OBJECTIVE:     Estimated body mass index is 29.68 kg/m² as calculated from the following:    Height as of this encounter: 5' 6" (1.676 m).    Weight as of this encounter: 83.4 kg (183 lb 13.8 oz).    Vital Signs (Most Recent)  Vitals:    09/20/24 1028   BP: 116/74       Physical Exam:    Physical Exam     GENERAL: patient sitting comfortably  HEENT: normocephalic  NECK: supple, no JVD  PULM: normal chest rise, no increased WOB  HEART: non-diaphoretic  ABDO: soft, nondistended, nontender  BACK: no CVA tenderness bilaterally  SKIN: warm, dry, well perfused  EXT: no bruising or edema  NEURO: grossly normal with no focal deficits  PSYCH: appropriate mood and affect    Genitourinary Exam:  deferred      LABS:     Lab Results   Component Value Date    BUN 12 09/17/2024    CREATININE 1.2 09/17/2024    WBC 7.29 09/17/2024    HGB 15.7 09/17/2024    HCT 49.7 09/17/2024     09/17/2024    AST 20 09/17/2024    ALT 26 09/17/2024    ALKPHOS 122 09/17/2024    ALBUMIN 3.8 09/17/2024    HGBA1C 9.2 (H) 09/17/2024    INR 1.0 04/14/2023        Urinalysis:   No results found for: "UAREFLEX"     PSA:  Lab Results   Component " "Value Date    PSA 0.94 09/17/2024    PSA 1.2 10/06/2020       Testosterone:  No results found for: "TOTALTESTOST", "TESTOSTERONE"     Imaging:  I have personally reviewed all relevant imaging studies.    No results found for this or any previous visit (from the past 2160 hour(s)).  No results found for this or any previous visit (from the past 2160 hour(s)).  X-Ray Chest AP Portable  Narrative: EXAMINATION:  XR CHEST AP PORTABLE    CLINICAL HISTORY:  Chest Pain;    COMPARISON:  07/27/2023    FINDINGS:  Clear lungs.  No vascular congestion, pleural fluid or pneumothorax.  Unchanged cardiomediastinal contours.  Prior sternotomy.  Left ICD.  Impression: No acute findings or radiographic change    Electronically signed by: Sara Nieto MD  Date:    02/21/2024  Time:    12:13         ASSESSMENT     1. Erectile dysfunction due to arterial insufficiency        PLAN:   Patient interested in intracavernosal agents.  I have called in an Rx to [facility] and he will follow up with instructions and a test dose.  He was forewarned of priapism and to go to ED for a sustained erection.      Alec Bennett MD  Urology  [facility]     Disclaimer: This note has been generated using voice-recognition software. There may be typographical errors that have been missed during proof-reading.     "

## 2024-09-23 DIAGNOSIS — E11.65 TYPE 2 DIABETES MELLITUS WITH HYPERGLYCEMIA, WITH LONG-TERM CURRENT USE OF INSULIN: ICD-10-CM

## 2024-09-23 DIAGNOSIS — Z79.4 TYPE 2 DIABETES MELLITUS WITH HYPERGLYCEMIA, WITH LONG-TERM CURRENT USE OF INSULIN: ICD-10-CM

## 2024-09-23 RX ORDER — DULAGLUTIDE 3 MG/.5ML
3 INJECTION, SOLUTION SUBCUTANEOUS
Qty: 4 PEN | Refills: 11 | Status: SHIPPED | OUTPATIENT
Start: 2024-09-23 | End: 2025-09-23

## 2024-10-01 ENCOUNTER — OFFICE VISIT (OUTPATIENT)
Dept: UROLOGY | Facility: CLINIC | Age: 59
End: 2024-10-01
Attending: SPECIALIST
Payer: MEDICARE

## 2024-10-01 VITALS — SYSTOLIC BLOOD PRESSURE: 114 MMHG | DIASTOLIC BLOOD PRESSURE: 62 MMHG

## 2024-10-01 DIAGNOSIS — N52.01 ERECTILE DYSFUNCTION DUE TO ARTERIAL INSUFFICIENCY: Primary | ICD-10-CM

## 2024-10-01 PROCEDURE — 3074F SYST BP LT 130 MM HG: CPT | Mod: CPTII,S$GLB,, | Performed by: SPECIALIST

## 2024-10-01 PROCEDURE — 99215 OFFICE O/P EST HI 40 MIN: CPT | Mod: S$GLB,,, | Performed by: SPECIALIST

## 2024-10-01 PROCEDURE — 3078F DIAST BP <80 MM HG: CPT | Mod: CPTII,S$GLB,, | Performed by: SPECIALIST

## 2024-10-01 PROCEDURE — 1159F MED LIST DOCD IN RCRD: CPT | Mod: CPTII,S$GLB,, | Performed by: SPECIALIST

## 2024-10-01 PROCEDURE — 3046F HEMOGLOBIN A1C LEVEL >9.0%: CPT | Mod: CPTII,S$GLB,, | Performed by: SPECIALIST

## 2024-10-01 PROCEDURE — 99999 PR PBB SHADOW E&M-EST. PATIENT-LVL II: CPT | Mod: PBBFAC,,, | Performed by: SPECIALIST

## 2024-10-01 PROCEDURE — 1160F RVW MEDS BY RX/DR IN RCRD: CPT | Mod: CPTII,S$GLB,, | Performed by: SPECIALIST

## 2024-10-01 PROCEDURE — 4010F ACE/ARB THERAPY RXD/TAKEN: CPT | Mod: CPTII,S$GLB,, | Performed by: SPECIALIST

## 2024-10-01 NOTE — PROGRESS NOTES
[facility] - Urology   Clinic Note    SUBJECTIVE:     Chief Complaint   Patient presents with    Follow-up     Test dose        Referral from: No ref. provider found.    History of Present Illness:  Miles Currie is a 58 y.o. male who presents to clinic for test dose of TriMix.      Past Medical History:   Diagnosis Date    Angina pectoris     Anticoagulant long-term use     plavix and  pletal    Anxiety and depression     Bipolar disorder     Patient denies    CHF (congestive heart failure)     Chronic bronchitis     Coronary artery disease     Diabetes mellitus     Diabetes with neurologic complications     Dyslipidemia     Encounter for blood transfusion     GERD (gastroesophageal reflux disease)     History of claustrophobia     IF SOMEONE TOUCHES HIS NOSE    Hyperlipidemia     Hypertension     Irregular heartbeat     Ischemic cardiomyopathy     MI (myocardial infarction) 2009    multiple MI's    Multiple gastric ulcers     Pacemaker     PAD (peripheral artery disease)     Pneumonia     Polyneuropathy     Presence of automatic implantable cardioverter-defibrillator     PVD (peripheral vascular disease)     Respiratory failure     Sleep apnea     does not have machine     Tobacco dependence     Trouble in sleeping     Type 2 diabetes with peripheral circulatory disorder, controlled        Current Outpatient Medications on File Prior to Visit   Medication Sig Dispense Refill    albuterol sulfate 90 mcg/actuation aebs Inhale 180 mcg into the lungs every 4 to 6 hours as needed.      alirocumab (PRALUENT PEN) 75 mg/mL PnIj Praluent Pen 75 mg/mL subcutaneous pen injector, [RxNorm: 5960368]      ALPRAZolam (XANAX) 0.25 MG tablet Take 0.25 mg by mouth daily as needed.      amiodarone (PACERONE) 200 MG Tab Take 200 mg by mouth once daily.      budesonide-formoterol 160-4.5 mcg (SYMBICORT) 160-4.5 mcg/actuation HFAA Inhale 2 puffs into the lungs every 12 (twelve) hours. Controller      buPROPion (WELLBUTRIN  "SR) 150 MG TBSR 12 hr tablet Wellbutrin  mg tablet, 12 hr sustained-release, [RxNorm: 484396] (Patient not taking: Reported on 9/18/2024)      carvediloL (COREG) 6.25 MG tablet Take 6.25 mg by mouth 2 (two) times daily.      clopidogrel (PLAVIX) 75 mg tablet Take 75 mg by mouth once daily.      dulaglutide (TRULICITY) 3 mg/0.5 mL pen injector Inject 3 mg into the skin every 7 days. 4 pen 11    ELIQUIS 5 mg Tab Take 1 tablet (5 mg total) by mouth 2 (two) times daily. 60 tablet 6    empagliflozin (JARDIANCE) 25 mg tablet Take 1 tablet (25 mg total) by mouth once daily. 90 tablet 3    evolocumab (REPATHA SURECLICK) 140 mg/mL PnIj Inject 140 mg into the skin every 14 (fourteen) days.      ezetimibe (ZETIA) 10 mg tablet ezetimibe 10 mg tablet, [RxNorm: 711108] (Patient not taking: Reported on 9/18/2024)      fenofibrate micronized (LOFIBRA) 134 MG Cap Take 134 mg by mouth once daily.      insulin (LANTUS SOLOSTAR U-100 INSULIN) glargine 100 units/mL SubQ pen Inject 40 Units into the skin 2 (two) times a day. 72 mL 3    insulin syringe-needle U-100 1 mL 29 gauge x 1/2" Syrg BID 10 each 0    isosorbide mononitrate (IMDUR) 30 MG 24 hr tablet isosorbide mononitrate ER 30 mg tablet,extended release 24 hr, [RxNorm: 462433] (Patient not taking: Reported on 9/18/2024)      mexiletine (MEXITIL) 150 MG Cap Take 1 capsule (150 mg total) by mouth 3 (three) times daily. 90 capsule 11    pantoprazole (PROTONIX) 40 MG tablet Take 40 mg by mouth once daily.      pen needle, diabetic (BD ULTRA-FINE SHORT PEN NEEDLE) 31 gauge x 5/16" Ndle USE AS DIRECTED TWICE DAILY 100 each 1    potassium chloride (K-TAB) 20 mEq Take 20 mEq by mouth Daily.      rosuvastatin (CRESTOR) 40 MG Tab Take 40 mg by mouth once daily.      sacubitriL-valsartan (ENTRESTO) 24-26 mg per tablet Entresto 24 mg-26 mg tablet, [RxNorm: 4049196]      spironolactone (ALDACTONE) 25 MG tablet spironolactone 25 mg tablet, [RxNorm: 969263] (Patient not taking: Reported on " "9/18/2024)      tadalafiL (CIALIS) 20 MG Tab Take 20 mg by mouth once daily.      torsemide (DEMADEX) 20 MG Tab Take 10 mg by mouth daily as needed.      TRUE METRIX GLUCOSE METER Misc CHECK BS BID      TRUE METRIX GLUCOSE TEST STRIP Strp CHECK BS BID      TRUEPLUS LANCETS 33 gauge Misc CHECK BS BID       No current facility-administered medications on file prior to visit.         OBJECTIVE:     Estimated body mass index is 29.68 kg/m² as calculated from the following:    Height as of 9/20/24: 5' 6" (1.676 m).    Weight as of 9/20/24: 83.4 kg (183 lb 13.8 oz).    Vital Signs (Most Recent)  Vitals:    10/01/24 1155   BP: 114/62       Physical Exam:    Physical Exam     GENERAL: patient sitting comfortably  HEENT: normocephalic  NECK: supple, no JVD  PULM: normal chest rise, no increased WOB  HEART: non-diaphoretic  ABDO: soft, nondistended, nontender  BACK: no CVA tenderness bilaterally  SKIN: warm, dry, well perfused  EXT: no bruising or edema  NEURO: grossly normal with no focal deficits  PSYCH: appropriate mood and affect    Genitourinary Exam:  Normal circumcised male phallus      LABS:     Lab Results   Component Value Date    BUN 12 09/17/2024    CREATININE 1.2 09/17/2024    WBC 7.29 09/17/2024    HGB 15.7 09/17/2024    HCT 49.7 09/17/2024     09/17/2024    AST 20 09/17/2024    ALT 26 09/17/2024    ALKPHOS 122 09/17/2024    ALBUMIN 3.8 09/17/2024    HGBA1C 9.2 (H) 09/17/2024    INR 1.0 04/14/2023        Urinalysis:   No results found for: "UAREFLEX"     PSA:  Lab Results   Component Value Date    PSA 0.94 09/17/2024    PSA 1.2 10/06/2020       Testosterone:  No results found for: "TOTALTESTOST", "TESTOSTERONE"     Imaging:  I have personally reviewed all relevant imaging studies.    No results found for this or any previous visit (from the past 2160 hours).  No results found for this or any previous visit (from the past 2160 hours).  X-Ray Chest AP Portable  Narrative: EXAMINATION:  XR CHEST AP " PORTABLE    CLINICAL HISTORY:  Chest Pain;    COMPARISON:  07/27/2023    FINDINGS:  Clear lungs.  No vascular congestion, pleural fluid or pneumothorax.  Unchanged cardiomediastinal contours.  Prior sternotomy.  Left ICD.  Impression: No acute findings or radiographic change    Electronically signed by: Sara Nieto MD  Date:    02/21/2024  Time:    12:13       Procedure:  Patient is penile shaft was prepped with alcohol.  An insulin syringe was used to drop 0.1 cc of TriMix.  TriMix was injected uneventfully at the 2 o'clock position.  Within 10 minutes he had a an erection suitable for intercourse.  Blood pressure check pending.  Patient was instructed to use medications sparingly at home and to go to the emergency room for a priapism, erection greater than 4 hours.  ASSESSMENT     1. Erectile dysfunction due to arterial insufficiency        PLAN:   Follow up in clinic in 3 months    Alec Bennett MD  Urology  [facility]     Disclaimer: This note has been generated using voice-recognition software. There may be typographical errors that have been missed during proof-reading.

## 2024-10-13 ENCOUNTER — HOSPITAL ENCOUNTER (EMERGENCY)
Facility: HOSPITAL | Age: 59
Discharge: HOME | End: 2024-10-13
Attending: SURGERY
Payer: MEDICARE

## 2024-10-13 VITALS
DIASTOLIC BLOOD PRESSURE: 58 MMHG | OXYGEN SATURATION: 98 % | SYSTOLIC BLOOD PRESSURE: 114 MMHG | HEART RATE: 69 BPM | WEIGHT: 184.19 LBS | BODY MASS INDEX: 29.73 KG/M2 | RESPIRATION RATE: 16 BRPM | TEMPERATURE: 98 F

## 2024-10-13 DIAGNOSIS — K08.89 PAIN, DENTAL: Primary | ICD-10-CM

## 2024-10-13 PROCEDURE — 99284 EMERGENCY DEPT VISIT MOD MDM: CPT

## 2024-10-13 RX ORDER — PENICILLIN V POTASSIUM 500 MG/1
500 TABLET, FILM COATED ORAL 4 TIMES DAILY
Qty: 28 TABLET | Refills: 0 | Status: SHIPPED | OUTPATIENT
Start: 2024-10-13 | End: 2024-10-20

## 2024-10-13 RX ORDER — ONDANSETRON 4 MG/1
4 TABLET, FILM COATED ORAL EVERY 6 HOURS
Qty: 12 TABLET | Refills: 0 | Status: SHIPPED | OUTPATIENT
Start: 2024-10-13

## 2024-10-13 NOTE — DISCHARGE INSTRUCTIONS
Please return to the Emergency Department immediately for any new or concerning symptoms or if they get worse.   If you were prescribed antibiotics, please take them to completion.   If you were prescribed a narcotic medication, do not drive or operate heavy equipment or machinery, while taking these medications.  Please follow up with your primary care doctor or specialist in one week.  If you smoke, please stop smoking.    If you do not have a doctor, you can go to the website [address] [place], [place] [email or web address] look up where there is a primary care clinic close to you.    Our goal in the emergency department is to always give you outstanding care and exceptional service. You may receive a survey by mail or e-mail in the next week regarding your experience in our ED. We would greatly appreciate your completing and returning the survey. Your feedback provides us with a way to recognize our staff who give very good care and it helps us learn how to improve when your experience was below our aspiration of excellence.

## 2024-10-13 NOTE — ED TRIAGE NOTES
58 y.o. male presents to ER Room/bed info not found   Chief Complaint   Patient presents with    Dental Pain   .   c/o dental pain to top left side for 3-4 days

## 2024-10-13 NOTE — ED PROVIDER NOTES
Encounter Date: 10/13/2024       History     Chief Complaint   Patient presents with    Dental Pain     Patient is a 58 year old male with PMHx of CAD, PAD, ischemic cardiomyopathy, CHF, HTN, HLD, DM2, GERD, hx of gastric ulcer, bipolar disorder, depression, and anxiety. He presents to the ED for dental pain. He reports having left upper dental pain for approximately four days. Denies recent or current abx use. He denies fever,chills, nausea, vomiting, sob, chest pain, abd pain, dysuria, diarrhea, or constipation. He is a current smoker and denies alcohol use.    The history is provided by the patient and medical records. No  was used.     Review of patient's allergies indicates:   Allergen Reactions    Fish oil Swelling    Gabapentin Swelling     Past Medical History:   Diagnosis Date    Angina pectoris     Anticoagulant long-term use     plavix and  pletal    Anxiety and depression     Bipolar disorder     Patient denies    CHF (congestive heart failure)     Chronic bronchitis     Coronary artery disease     Diabetes mellitus     Diabetes with neurologic complications     Dyslipidemia     Encounter for blood transfusion     GERD (gastroesophageal reflux disease)     History of claustrophobia     IF SOMEONE TOUCHES HIS NOSE    Hyperlipidemia     Hypertension     Irregular heartbeat     Ischemic cardiomyopathy     MI (myocardial infarction) 2009    multiple MI's    Multiple gastric ulcers     Pacemaker     PAD (peripheral artery disease)     Pneumonia     Polyneuropathy     Presence of automatic implantable cardioverter-defibrillator     PVD (peripheral vascular disease)     Respiratory failure     Sleep apnea     does not have machine     Tobacco dependence     Trouble in sleeping     Type 2 diabetes with peripheral circulatory disorder, controlled      Past Surgical History:   Procedure Laterality Date    ABLATION N/A 8/2/2023    Procedure: Ablation;  Surgeon: Ge Benedict MD;  Location:  [facility] CATH;  Service: Cardiology;  Laterality: N/A;  *ICD*    ANGIOPLASTY Bilateral     right  and left iliac stents    AORTA - BILATERAL FEMORAL ARTERY BYPASS GRAFT  07/09/2013    APPENDECTOMY      ARTERIAL THROMBECTOMY Right 02/16/2012    iliac artery    ARTERIAL THROMBECTOMY Left 02/23/2012    brachial artery    CARDIAC DEFIBRILLATOR PLACEMENT Right 10/23/2012    [employer]    CORONARY ANGIOPLASTY WITH STENT PLACEMENT      CORONARY ARTERY BYPASS GRAFT  2011    x3    CREATION OF FEMORAL-FEMORAL ARTERY BYPASS WITH GRAFT N/A 3/27/2019    Procedure: CREATION, BYPASS, ARTERIAL, FEMORAL TO FEMORAL, USING GRAFT - RIGHT TO LEFT;  Surgeon: Efren Fung MD;  Location: [facility] OR;  Service: Cardiothoracic;  Laterality: N/A;    ENDARTERECTOMY OF FEMORAL ARTERY Right 3/27/2019    Procedure: ENDARTERECTOMY, FEMORAL;  Surgeon: Efren Fung MD;  Location: [facility] OR;  Service: Cardiothoracic;  Laterality: Right;    EXPLORATION OF FEMORAL ARTERY Left 6/27/2019    Procedure: EXPLORATION, ARTERY, FEMORAL with repair;  Surgeon: Naresh Randolph MD;  Location: [facility] OR;  Service: Cardiovascular;  Laterality: Left;    FINGER SURGERY      amputation right middle finger    LEFT HEART CATHETERIZATION N/A 5/17/2023    Procedure: Left heart cath;  Surgeon: Ezequiel Peña MD;  Location: [facility] CATH;  Service: Cardiology;  Laterality: N/A;    LEFT HEART CATHETERIZATION Left 2/19/2024    Procedure: Left heart cath;  Surgeon: Ezequiel Peña MD;  Location: [facility] CATH;  Service: Cardiology;  Laterality: Left;    PERCUTANEOUS TRANSLUMINAL ANGIOPLASTY (PTA) OF PERIPHERAL VESSEL N/A 1/9/2019    Procedure: PTA, PERIPHERAL VESSEL;  Surgeon: Alex Pittman MD;  Location: [facility] CATH;  Service: Cardiology;  Laterality: N/A;  administer lytic therapy overnight on Wednesday with then plans to touch up other vessels Thursday morning January 10,2019    PERCUTANEOUS TRANSLUMINAL ANGIOPLASTY (PTA) OF PERIPHERAL VESSEL N/A 6/27/2019    Procedure: PTA,  PERIPHERAL VESSEL;  Surgeon: Alex Pittman MD;  Location: [facility] CATH;  Service: Cardiology;  Laterality: N/A;    PERCUTANEOUS TRANSLUMINAL ANGIOPLASTY (PTA) OF PERIPHERAL VESSEL N/A 1/30/2020    Procedure: PTA, PERIPHERAL VESSEL;  Surgeon: Alex Pittman MD;  Location: [facility] CATH;  Service: Cardiology;  Laterality: N/A;    REPLACEMENT OF IMPLANTABLE CARDIOVERTER-DEFIBRILLATOR (ICD) GENERATOR N/A 12/15/2021    Procedure: REPLACEMENT, ICD GENERATOR with possible upgrade to CRT;  Surgeon: Houston Pineda MD;  Location: [facility] CATH;  Service: Cardiology;  Laterality: N/A;    WOUND EXPLORATION Left 6/27/2019    Procedure: EXPLORATION, WOUND  Foreign body left femoral;  Surgeon: Naresh Randolph MD;  Location: [facility] OR;  Service: Cardiovascular;  Laterality: Left;     Family History   Problem Relation Name Age of Onset    Cancer Mother          bone    Heart attacks under age 50 Brother      Diabetes Daughter      Hypertension Father      Heart disease Father      Hyperlipidemia Father      Heart attacks under age 50 Paternal Grandfather       Social History     Tobacco Use    Smoking status: Every Day     Current packs/day: 1.00     Average packs/day: 1 pack/day for 46.8 years (46.8 ttl pk-yrs)     Types: Cigarettes     Start date: 1978     Passive exposure: Current    Smokeless tobacco: Never   Substance Use Topics    Alcohol use: Not Currently    Drug use: No     Review of Systems   Constitutional:  Negative for fever.   HENT:  Positive for dental problem. Negative for sore throat.    Respiratory:  Negative for shortness of breath.    Cardiovascular:  Negative for chest pain.   Gastrointestinal:  Negative for abdominal pain, nausea and vomiting.   Genitourinary:  Negative for dysuria.   Musculoskeletal:  Negative for back pain.   Skin:  Negative for rash.   Neurological:  Negative for weakness.   Hematological:  Does not bruise/bleed easily.       Physical Exam     Initial Vitals [10/13/24 1410]   BP Pulse Resp Temp  SpO2   (!) 114/58 69 16 97.9 °F (36.6 °C) 98 %      MAP       --         Physical Exam    Vitals reviewed.  Constitutional: He appears well-developed and well-nourished. No distress.   HENT:   Head: Normocephalic. Mouth/Throat: Uvula is midline, oropharynx is clear and moist and mucous membranes are normal. No trismus in the jaw. Abnormal dentition (diffuse poor dentition). Dental caries present. No dental abscesses or uvula swelling. No oropharyngeal exudate, posterior oropharyngeal edema or posterior oropharyngeal erythema.   Patient tolerating oral secretions without difficulty.    Eyes: Conjunctivae and EOM are normal.   Neck:   Normal range of motion.  Cardiovascular:  Normal rate and regular rhythm.           No murmur heard.  Pulmonary/Chest: Breath sounds normal. No respiratory distress. He has no wheezes. He has no rales.   Musculoskeletal:         General: Normal range of motion.      Cervical back: Normal range of motion.     Neurological: He is alert and oriented to person, place, and time. He has normal strength. Gait normal.   Skin: Skin is warm and dry.         ED Course   Procedures  Labs Reviewed - No data to display          Medications - No data to display  Medical Decision Making  Problems Addressed:  Pain, dental: acute illness or injury    Risk  OTC drugs.  Prescription drug management.         APC / Resident Notes:   Patient is a 58 year old male who presents to the ED for emergent evaluation of dental pain.     Differential diagnoses include, but are not limited to: dental abscess, dental caries, pulpitis, or acute gingivitis     I have discussed emergency department findings, and plan with the patient. Will discharge home with abx. Will discharge home with F/U with dentist in approximately one week. Dental clinic resources provided to patient to assist with F/U. Additional verbal discharge instructions were given and discussed with the patient. Patient verbalizes understanding of plan and  agrees. Return precautions given.                     Clinical Impression:  Final diagnoses:  [K08.89] Pain, dental (Primary)          ED Disposition Condition    Discharge Stable          ED Prescriptions       Medication Sig Dispense Start Date End Date Auth. Provider    penicillin v potassium (VEETID) 500 MG tablet Take 1 tablet (500 mg total) by mouth 4 (four) times daily. for 7 days 28 tablet 10/13/2024 10/20/2024 Zoë Harrington PA-C    ondansetron (ZOFRAN) 4 MG tablet Take 1 tablet (4 mg total) by mouth every 6 (six) hours. 12 tablet 10/13/2024 -- Zoë Harrington PA-C          Follow-up Information       Follow up With Specialties Details Why Contact Info    your dentist  Schedule an appointment as soon as possible for a visit in 1 week               Zoë Harrington PA-C  10/13/24 [id number]

## 2024-10-14 ENCOUNTER — PATIENT OUTREACH (OUTPATIENT)
Dept: EMERGENCY MEDICINE | Facility: HOSPITAL | Age: 59
End: 2024-10-14
Payer: MEDICARE

## 2024-10-14 NOTE — PROGRESS NOTES
Mr Aquino stated that his [employer] does not cover much of his dental costs. Mr Aquino did state that he has the list of Dentist that the ED provided and he will reach out to the [facility] [employer] to see if they will be able to see him. I also recommended that patient reach out to [facility] to see if they cover any dentists in his area. I will follow up with the patient in a few weeks.

## 2024-10-29 ENCOUNTER — PATIENT OUTREACH (OUTPATIENT)
Dept: ADMINISTRATIVE | Facility: HOSPITAL | Age: 59
End: 2024-10-29
Payer: MEDICARE

## 2024-11-01 ENCOUNTER — PATIENT OUTREACH (OUTPATIENT)
Dept: ADMINISTRATIVE | Facility: HOSPITAL | Age: 59
End: 2024-11-01
Payer: MEDICARE

## 2025-01-07 ENCOUNTER — OFFICE VISIT (OUTPATIENT)
Dept: INTERNAL MEDICINE | Facility: CLINIC | Age: 60
End: 2025-01-07
Payer: MEDICARE

## 2025-01-07 ENCOUNTER — HOSPITAL ENCOUNTER (OUTPATIENT)
Dept: RADIOLOGY | Facility: HOSPITAL | Age: 60
Discharge: HOME | End: 2025-01-07
Attending: INTERNAL MEDICINE
Payer: MEDICARE

## 2025-01-07 VITALS
DIASTOLIC BLOOD PRESSURE: 60 MMHG | BODY MASS INDEX: 30.47 KG/M2 | WEIGHT: 189.63 LBS | HEIGHT: 66 IN | HEART RATE: 58 BPM | SYSTOLIC BLOOD PRESSURE: 102 MMHG | RESPIRATION RATE: 18 BRPM | OXYGEN SATURATION: 86 %

## 2025-01-07 DIAGNOSIS — R09.02 HYPOXIA: ICD-10-CM

## 2025-01-07 DIAGNOSIS — J44.9 CHRONIC OBSTRUCTIVE PULMONARY DISEASE, UNSPECIFIED COPD TYPE: ICD-10-CM

## 2025-01-07 DIAGNOSIS — I47.29 NSVT (NONSUSTAINED VENTRICULAR TACHYCARDIA): ICD-10-CM

## 2025-01-07 DIAGNOSIS — E11.65 UNCONTROLLED TYPE 2 DIABETES MELLITUS WITH HYPERGLYCEMIA: Primary | ICD-10-CM

## 2025-01-07 DIAGNOSIS — I50.42 CHRONIC COMBINED SYSTOLIC AND DIASTOLIC HEART FAILURE: ICD-10-CM

## 2025-01-07 DIAGNOSIS — I70.229 CRITICAL LOWER LIMB ISCHEMIA: ICD-10-CM

## 2025-01-07 DIAGNOSIS — J44.1 CHRONIC OBSTRUCTIVE PULMONARY DISEASE WITH ACUTE EXACERBATION: ICD-10-CM

## 2025-01-07 PROBLEM — E11.40 CONTROLLED TYPE 2 DIABETES WITH NEUROPATHY: Status: RESOLVED | Noted: 2024-03-05 | Resolved: 2025-01-07

## 2025-01-07 PROCEDURE — 1159F MED LIST DOCD IN RCRD: CPT | Mod: CPTII,S$GLB,, | Performed by: INTERNAL MEDICINE

## 2025-01-07 PROCEDURE — 3008F BODY MASS INDEX DOCD: CPT | Mod: CPTII,S$GLB,, | Performed by: INTERNAL MEDICINE

## 2025-01-07 PROCEDURE — 71046 X-RAY EXAM CHEST 2 VIEWS: CPT | Mod: 26,,, | Performed by: RADIOLOGY

## 2025-01-07 PROCEDURE — 99999 PR PBB SHADOW E&M-EST. PATIENT-LVL V: CPT | Mod: PBBFAC,,, | Performed by: INTERNAL MEDICINE

## 2025-01-07 PROCEDURE — 71046 X-RAY EXAM CHEST 2 VIEWS: CPT | Mod: TC

## 2025-01-07 PROCEDURE — G2211 COMPLEX E/M VISIT ADD ON: HCPCS | Mod: S$GLB,,, | Performed by: INTERNAL MEDICINE

## 2025-01-07 PROCEDURE — 3074F SYST BP LT 130 MM HG: CPT | Mod: CPTII,S$GLB,, | Performed by: INTERNAL MEDICINE

## 2025-01-07 PROCEDURE — 99215 OFFICE O/P EST HI 40 MIN: CPT | Mod: S$GLB,,, | Performed by: INTERNAL MEDICINE

## 2025-01-07 PROCEDURE — 1160F RVW MEDS BY RX/DR IN RCRD: CPT | Mod: CPTII,S$GLB,, | Performed by: INTERNAL MEDICINE

## 2025-01-07 PROCEDURE — 3078F DIAST BP <80 MM HG: CPT | Mod: CPTII,S$GLB,, | Performed by: INTERNAL MEDICINE

## 2025-01-07 RX ORDER — AZITHROMYCIN 500 MG/1
500 TABLET, FILM COATED ORAL DAILY
Qty: 5 TABLET | Refills: 0 | Status: SHIPPED | OUTPATIENT
Start: 2025-01-07 | End: 2025-01-12

## 2025-01-07 RX ORDER — PREDNISONE 20 MG/1
40 TABLET ORAL DAILY
Qty: 10 TABLET | Refills: 0 | Status: SHIPPED | OUTPATIENT
Start: 2025-01-07 | End: 2025-01-12

## 2025-01-07 RX ORDER — BLOOD-GLUCOSE,RECEIVER,CONT
EACH MISCELLANEOUS
Qty: 1 EACH | Refills: 1 | Status: SHIPPED | OUTPATIENT
Start: 2025-01-07

## 2025-01-07 RX ORDER — BLOOD-GLUCOSE SENSOR
EACH MISCELLANEOUS
Qty: 5 EACH | Refills: 1 | Status: SHIPPED | OUTPATIENT
Start: 2025-01-07

## 2025-01-07 RX ORDER — IPRATROPIUM BROMIDE AND ALBUTEROL SULFATE 2.5; .5 MG/3ML; MG/3ML
3 SOLUTION RESPIRATORY (INHALATION) EVERY 6 HOURS PRN
Qty: 75 ML | Refills: 0 | Status: SHIPPED | OUTPATIENT
Start: 2025-01-07 | End: 2026-01-07

## 2025-01-07 NOTE — PROGRESS NOTES
"Subjective:       Patient ID: Miles Currie is a 59 y.o. male.    Chief Complaint: Follow-up (Pt is here today for a 3month f/u./)      HPI:  Patient is known to me and presents for follow up DM, PVD, CHF, NSVT, tobacco abuse and GERD. Labs from 9/17/24 personally reviewed, interpreted and discussed today. He did not do repeat labs prior to today's visit.  A1C 9.2%, elevated  PSA normal 9/2024  LDL 50, at goal   GFR > 60, normal    Today he reports SOb. Sx started 3 weeks ago. He reports sx are getting worse. He is coughing "jelly" sputum. + wheezing. No fevers. SOB is worse with exertion--can only walk about 20 ft. He is SOB after walking down the jason to exam room in clinic. He does recover with sitting for a while. Pulse ox is 86% initially. Several checks throughout visit ranging 86-92%--unsure validity given his PVD using finger probe. I do note his finger tips on left hand look blue and he states they have been that way for "a while". No pain in hand. No gangrene noted on exam.     Diabetes: Remains uncontrolled but was down trending On Lantus 40 units BId, Jardiance 25mg (added after last labs), and trulicity 3mg weekly. He reports glucose had come down to 170-180s; last week glucose jumped to 400s again.   Reports med compliance.      CAD/PVD/recurrent VT: on crestor, fenofibrate, repatha (?praulent. Both are listed and patient unsure) and zetia and fenofibrate for HLD control. On Plavix and Eliquis for anticoagulation. On coreg and entresto per cardiology.  7/2023 He was having recurrent syncope and admitted for management. Underwent PCI of LAD and circumflex. Initially started o PO amiodarone but continued to have syncope and VT. He was then admitted again July 2023 for VT ablation. On amiodarone. Denies palpitatoins today.  He was admitted 2/2024 for angina and hypotension. Patient had [facility]/coronary angiography 2/19/24.  This revealed occluded saphenous vein grafts.  Patient underwent revascularization " "of the left circumflex InStent restenosis with balloon PTCA. He was then admitted with SOB and chest pain and found to be COVID +. Treated and discharged. No hospitalizations again un til 8/31/24 ER visit for abscess to left forearm.  I&D done and cultures + MRSA. Treated with 7 days bactrim. Wound healed today.        neuropathy/PAD. Failed Gabapentin, cymbalta. States the Cialis helps his pian in the legs "feels like it opens everything up"  No acute pan issues reported today.      CHF: on entresto, torsemide/KCl and aldactone. No signs of volume overload today.      Bipolar: diagnosed by outside physician. On wellbutrin per outside provider and trazodone 50mg at night.     Tobacco use: continues to smoke 1 PPD.      GERD: well controlled with protonix.        Past Medical History:   Diagnosis Date    Angina pectoris     Anticoagulant long-term use     plavix and  pletal    Anxiety and depression     Bipolar disorder     Patient denies    CHF (congestive heart failure)     Chronic bronchitis     Coronary artery disease     Diabetes mellitus     Diabetes with neurologic complications     Dyslipidemia     Encounter for blood transfusion     GERD (gastroesophageal reflux disease)     History of claustrophobia     IF SOMEONE TOUCHES HIS NOSE    Hyperlipidemia     Hypertension     Irregular heartbeat     Ischemic cardiomyopathy     MI (myocardial infarction) 2009    multiple MI's    Multiple gastric ulcers     Pacemaker     PAD (peripheral artery disease)     Pneumonia     Polyneuropathy     Presence of automatic implantable cardioverter-defibrillator     PVD (peripheral vascular disease)     Respiratory failure     Sleep apnea     does not have machine     Tobacco dependence     Trouble in sleeping     Type 2 diabetes with peripheral circulatory disorder, controlled        Family History   Problem Relation Name Age of Onset    Cancer Mother          bone    Heart attacks under age 50 Brother      Diabetes Daughter   "    Hypertension Father      Heart disease Father      Hyperlipidemia Father      Heart attacks under age 50 Paternal Grandfather         Social History     Socioeconomic History    Marital status: Legally    Tobacco Use    Smoking status: Every Day     Current packs/day: 1.00     Average packs/day: 1 pack/day for 47.0 years (47.0 ttl pk-yrs)     Types: Cigarettes     Start date: 1978     Passive exposure: Current    Smokeless tobacco: Never   Substance and Sexual Activity    Alcohol use: Not Currently    Drug use: No    Sexual activity: Yes     Social Drivers of Health     Financial Resource Strain: High Risk (10/29/2024)    Overall Financial Resource Strain (CARDIA)     Difficulty of Paying Living Expenses: Very hard   Food Insecurity: Food Insecurity Present (10/29/2024)    Hunger Vital Sign     Worried About Running Out of Food in the Last Year: Often true     Ran Out of Food in the Last Year: Often true   Transportation Needs: No Transportation Needs (10/29/2024)    TRANSPORTATION NEEDS     Transportation : No   Physical Activity: Sufficiently Active (3/5/2024)    Exercise Vital Sign     Days of Exercise per Week: 5 days     Minutes of Exercise per Session: 30 min   Stress: Stress Concern Present (3/5/2024)    [employer] [place] [employer] - Occupational Stress Questionnaire     Feeling of Stress : Very much   Housing Stability: High Risk (3/5/2024)    Housing Stability Vital Sign     Unable to Pay for Housing in the Last Year: Yes     Number of Places Lived in the Last Year: 2     Unstable Housing in the Last Year: No       Review of Systems   Constitutional:  Positive for fatigue. Negative for activity change, fever and unexpected weight change.   HENT:  Negative for congestion, ear pain, hearing loss, rhinorrhea and sore throat.    Eyes:  Negative for redness and visual disturbance.   Respiratory:  Positive for cough, chest tightness, shortness of breath and wheezing.    Cardiovascular:   Negative for chest pain, palpitations and leg swelling.   Gastrointestinal:  Negative for abdominal pain, constipation, diarrhea, nausea and vomiting.   Genitourinary:  Negative for dysuria and frequency.   Musculoskeletal:  Negative for back pain, joint swelling and neck pain.   Skin:  Negative for color change, rash and wound.   Neurological:  Negative for dizziness, light-headedness and headaches.         Objective:      Physical Exam  Vitals reviewed.   Constitutional:       General: He is not in acute distress.     Appearance: He is well-developed.   HENT:      Head: Normocephalic and atraumatic.      Right Ear: External ear normal.      Left Ear: External ear normal.   Eyes:      General:         Right eye: No discharge.         Left eye: No discharge.      Conjunctiva/sclera: Conjunctivae normal.   Neck:      Thyroid: No thyromegaly.   Cardiovascular:      Rate and Rhythm: Normal rate and regular rhythm.      Heart sounds: No murmur heard.  Pulmonary:      Effort: Pulmonary effort is normal. No respiratory distress.      Breath sounds: Wheezing present. No rales.   Abdominal:      General: There is no distension.      Palpations: Abdomen is soft.      Tenderness: There is no abdominal tenderness.   Musculoskeletal:      Right lower leg: No edema.      Left lower leg: No edema.   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   Psychiatric:         Behavior: Behavior normal.         Thought Content: Thought content normal.         Assessment:       1. Uncontrolled type 2 diabetes mellitus with hyperglycemia    2. Chronic combined systolic and diastolic heart failure    3. NSVT (nonsustained ventricular tachycardia)    4. Critical lower limb ischemia    5. Chronic  obstructive pulmonary disease with acute exacerbation    6. Hypoxia        Plan:       1. Uncontrolled type 2 diabetes mellitus with hyperglycemia  Chronic improving with addition of meds last visit  Udpate labs with A1C  Cont meds same dose pending labs  -     CBC Auto Differential; Future; Expected date: 04/07/2025  -     Comprehensive Metabolic Panel; Future; Expected date: 04/07/2025  -     Hemoglobin A1C; Future; Expected date: 04/07/2025  -     Lipid Panel; Future; Expected date: 04/07/2025  -     blood-glucose sensor (DEXCOM G7 SENSOR) [place]; Check blood sugar continuous  Dispense: 5 each; Refill: 1  -     blood-glucose meter,continuous (DEXCOM G7 ) Misc; Check blood sugar continuously  Dispense: 1 each; Refill: 1    2. Chronic combined systolic and diastolic heart failure  Chronic stable  No volume overload on exam--not felt to be primary reason for SOB/hypoxia today  Cont meds  F/u cards as planned  -     CBC Auto Differential; Future; Expected date: 04/07/2025  -     Comprehensive Metabolic Panel; Future; Expected date: 04/07/2025  -     Hemoglobin A1C; Future; Expected date: 04/07/2025  -     Lipid Panel; Future; Expected date: 04/07/2025    3. NSVT (nonsustained ventricular tachycardia)  Chronic stable  Cont meds  F/u cards as planned    4. Critical lower limb ischemia  History of  Cont cholesterol meds  Cont anticoagulation  Left hand with some changes of hypoxia but I do feel pulse in wrist so less likely arterial occlusion there    5. Chronic obstructive pulmonary disease with acute exacerbation  Acute exacerbation  + hypoxia  Declines ER/hospital admission  Unsure how accurate pulse ox is in clinic but he appears SOB with any exertion, + wheezing + cough. Appears ill today. Despite my urging declines ER  CXR now  Short course steroids and azithro  Prescribed neb machine and meds--use TID as least right now  Stop smoking  -     predniSONE (DELTASONE) 20 MG tablet; Take 2 tablets (40 mg total)  by mouth once daily. for 5 days  Dispense: 10 tablet; Refill: 0  -     NEBULIZER FOR HOME USE  -     albuterol-ipratropium (DUO-NEB) 2.5 mg-0.5 mg/3 mL nebulizer solution; Take 3 mLs by nebulization every 6 (six) hours as needed for Wheezing. Rescue  Dispense: 75 mL; Refill: 0  -     azithromycin (ZITHROMAX) 500 MG tablet; Take 1 tablet (500 mg total) by mouth once daily. for 5 days  Dispense: 5 tablet; Refill: 0  -     X-Ray Chest PA And Lateral; Future; Expected date: 01/07/2025    6. Hypoxia  Likely due to COPD  Advised ER/hospital admit--declined. Able to understand and verbalize risks back to me including death  Will treat outpatient: CXR, steroids, nebs, antibx  Strict ER return for worsening symptoms. He voiced understanding  -     predniSONE (DELTASONE) 20 MG tablet; Take 2 tablets (40 mg total) by mouth once daily. for 5 days  Dispense: 10 tablet; Refill: 0  -     NEBULIZER FOR HOME USE  -     albuterol-ipratropium (DUO-NEB) 2.5 mg-0.5 mg/3 mL nebulizer solution; Take 3 mLs by nebulization every 6 (six) hours as needed for Wheezing. Rescue  Dispense: 75 mL; Refill: 0  -     azithromycin (ZITHROMAX) 500 MG tablet; Take 1 tablet (500 mg total) by mouth once daily. for 5 days  Dispense: 5 tablet; Refill: 0  -     X-Ray Chest PA And Lateral; Future; Expected date: 01/07/2025       RTC 3 months with labs for DM and PRN-return here or ER if worsening respiratory sx for re-eval. To ER if sats < 90% at home!

## 2025-01-08 DIAGNOSIS — I25.119 CORONARY ARTERY DISEASE INVOLVING NATIVE CORONARY ARTERY OF NATIVE HEART WITH ANGINA PECTORIS: Primary | ICD-10-CM

## 2025-01-08 DIAGNOSIS — E11.65 UNCONTROLLED TYPE 2 DIABETES MELLITUS WITH HYPERGLYCEMIA: ICD-10-CM

## 2025-01-08 RX ORDER — TIRZEPATIDE 2.5 MG/.5ML
2.5 INJECTION, SOLUTION SUBCUTANEOUS
Qty: 2 ML | Refills: 0 | Status: SHIPPED | OUTPATIENT
Start: 2025-01-08 | End: 2025-02-07

## 2025-01-08 RX ORDER — TIRZEPATIDE 5 MG/.5ML
5 INJECTION, SOLUTION SUBCUTANEOUS
Qty: 2 ML | Refills: 0 | Status: SHIPPED | OUTPATIENT
Start: 2025-02-08 | End: 2025-03-10

## 2025-01-24 ENCOUNTER — PATIENT OUTREACH (OUTPATIENT)
Dept: INTERNAL MEDICINE | Facility: CLINIC | Age: 60
End: 2025-01-24
Payer: MEDICARE

## 2025-01-24 NOTE — TELEPHONE ENCOUNTER
Introduced pt to digital medicine program.  Explained eligibility for the DM2 services and steps to enrollment.  Pt is interested but needs assistance with sign up.  Agreed to come in for visit on 1/28/25 at 9a at the  clinic.

## 2025-01-28 ENCOUNTER — CLINICAL SUPPORT (OUTPATIENT)
Dept: INTERNAL MEDICINE | Facility: CLINIC | Age: 60
End: 2025-01-28
Payer: MEDICARE

## 2025-01-28 DIAGNOSIS — E11.65 UNCONTROLLED TYPE 2 DIABETES MELLITUS WITH HYPERGLYCEMIA: Primary | ICD-10-CM

## 2025-01-28 NOTE — PROGRESS NOTES
Mr. Currie here for assistance with setting up digital medicine program.  Will set up for the DM2 services.  [facility] and [facility].  Explained steps to enrollment.  He will working on getting Dexcom G7 soon.  Advised that care team will be able to use readings from his CGM.  Will call office if any assistance is needed.  Contact number provided

## 2025-01-29 ENCOUNTER — PATIENT OUTREACH (OUTPATIENT)
Dept: ADMINISTRATIVE | Facility: HOSPITAL | Age: 60
End: 2025-01-29
Payer: MEDICARE

## 2025-01-29 DIAGNOSIS — Z13.5 ENCOUNTER FOR SCREENING FOR DIABETIC RETINOPATHY: ICD-10-CM

## 2025-01-29 DIAGNOSIS — E11.65 UNCONTROLLED TYPE 2 DIABETES MELLITUS WITH HYPERGLYCEMIA: Primary | ICD-10-CM

## 2025-01-29 NOTE — PROGRESS NOTES
Care Coordination Encounter Details:       MyChart Portal Status:         [x]  Reviewed MyChart Portal Status offered / enrolled if applicable        Additional Notes:     MyChart Outcomes: Pt is enrolled & active          Updates Requested / Reviewed:        Updated Care Coordination Note, Care Everywhere, , External Sources: [facility] and [facility], Care Team Updated, Removed  or Duplicate Orders, and Immunizations Reconciliation Completed or Queried: [place]         Health Maintenance Screening(s) Due:      Health Maintenance Topics Overdue:      VBHM Score: 4     Urine Screening  Eye Exam  Foot Exam  LDCT Lung Screen                       Health Maintenance Topic(s) Outreach Outcomes & Actions Taken:    Lab(s) - Outreach Outcomes & Actions Taken  : pt notified and requested to go at his convience fasting    Primary Care Appt - Outreach Outcomes & Actions Taken  : Primary Care Appt Scheduled      Eye Exam - Outreach Outcomes & Actions Taken  : Eye Exam Screening Order Placed and Eye Camera Scheduled or Optometry/Ophthalmology Referral Placed/Appt Scheduled     Additional Notes:             Chronic Disease Management:     Diabetes Measures        Lab Results   Component Value Date    HGBA1C 10.3 (H) 2025           [x]  Reviewed chart for active Diabetes diagnosis     []  Scheduled necessary follow up appointments if needed         Additional Notes:             Hypertension Measures        BP Readings from Last 1 Encounters:   25 102/60           [x]  Reviewed chart for active Hypertension diagnosis     []  Reviewed & documented Home BP Cuff     []  Documented a Remote BP if needed & applicable     []  Scheduled necessary follow up appointments with Primary Care if needed         Additional Notes:             Provider Team Continuity:     Last PCP Visit Date: 2025          [x]  Reviewed Primary Care Provider Visits, Annual Wellness Visit, and Future          Appointments to ensure  appointments have been scheduled and/or           completed        Additional Notes:             Social Determinants of Health          [x]  Reviewed, completed, and/or updated the following sections:                  Food Insecurity, Transportation Needs, Financial Resource Strain,                 Tobacco Use        Additional Notes:             Care Management, Digital Medicine, and/or Education Referrals    OPCM Risk Score: 79.3                 Additional Notes:

## 2025-02-24 ENCOUNTER — TELEPHONE (OUTPATIENT)
Dept: INTERNAL MEDICINE | Facility: CLINIC | Age: 60
End: 2025-02-24
Payer: MEDICARE

## 2025-02-24 ENCOUNTER — HOSPITAL ENCOUNTER (OUTPATIENT)
Facility: HOSPITAL | Age: 60
Discharge: HOME | End: 2025-02-25
Admitting: INTERNAL MEDICINE
Payer: MEDICARE

## 2025-02-24 DIAGNOSIS — I50.9 CONGESTIVE HEART FAILURE, UNSPECIFIED HF CHRONICITY, UNSPECIFIED HEART FAILURE TYPE: ICD-10-CM

## 2025-02-24 DIAGNOSIS — R06.02 SHORTNESS OF BREATH: ICD-10-CM

## 2025-02-24 DIAGNOSIS — I25.10 CARDIOVASCULAR DISEASE: ICD-10-CM

## 2025-02-24 DIAGNOSIS — R10.9 ABDOMINAL PAIN, UNSPECIFIED ABDOMINAL LOCATION: ICD-10-CM

## 2025-02-24 DIAGNOSIS — I73.9 PERIPHERAL VASCULAR DISEASE: ICD-10-CM

## 2025-02-24 DIAGNOSIS — J81.0 ACUTE PULMONARY EDEMA: ICD-10-CM

## 2025-02-24 DIAGNOSIS — Z95.810 AICD (AUTOMATIC CARDIOVERTER/DEFIBRILLATOR) PRESENT: ICD-10-CM

## 2025-02-24 DIAGNOSIS — R07.9 CHEST PAIN, UNSPECIFIED TYPE: Primary | ICD-10-CM

## 2025-02-24 DIAGNOSIS — I50.9 CHF (CONGESTIVE HEART FAILURE): ICD-10-CM

## 2025-02-24 LAB
ALBUMIN SERPL BCP-MCNC: 3.5 G/DL (ref 3.5–5.2)
ALLENS TEST: ABNORMAL
ALP SERPL-CCNC: 435 U/L (ref 40–150)
ALT SERPL W/O P-5'-P-CCNC: 66 U/L (ref 10–44)
ANION GAP SERPL CALC-SCNC: 11 MMOL/L (ref 8–16)
APTT PPP: 26.6 SEC (ref 21–32)
AST SERPL-CCNC: 62 U/L (ref 10–40)
BACTERIA #/AREA URNS HPF: NORMAL /HPF
BASOPHILS # BLD AUTO: 0.05 K/UL (ref 0–0.2)
BASOPHILS NFR BLD: 0.7 % (ref 0–1.9)
BILIRUB SERPL-MCNC: 1.1 MG/DL (ref 0.1–1)
BILIRUB UR QL STRIP: NEGATIVE
BNP SERPL-MCNC: 882 PG/ML (ref 0–99)
BUN SERPL-MCNC: 14 MG/DL (ref 6–20)
CALCIUM SERPL-MCNC: 8.9 MG/DL (ref 8.7–10.5)
CHLORIDE SERPL-SCNC: 103 MMOL/L (ref 95–110)
CLARITY UR: CLEAR
CO2 SERPL-SCNC: 21 MMOL/L (ref 23–29)
COLOR UR: YELLOW
CREAT SERPL-MCNC: 1.4 MG/DL (ref 0.5–1.4)
DELSYS: ABNORMAL
DIFFERENTIAL METHOD BLD: ABNORMAL
EOSINOPHIL # BLD AUTO: 0.1 K/UL (ref 0–0.5)
EOSINOPHIL NFR BLD: 1.6 % (ref 0–8)
ERYTHROCYTE [DISTWIDTH] IN BLOOD BY AUTOMATED COUNT: 15.4 % (ref 11.5–14.5)
EST. GFR  (NO RACE VARIABLE): 58 ML/MIN/1.73 M^2
FIO2: 21 (ref 21–100)
GLUCOSE SERPL-MCNC: 211 MG/DL (ref 70–110)
GLUCOSE UR QL STRIP: ABNORMAL
HCO3 UR-SCNC: 19 MMOL/L (ref 22–26)
HCT VFR BLD AUTO: 50.7 % (ref 40–54)
HGB BLD-MCNC: 15.5 G/DL (ref 14–18)
HGB UR QL STRIP: ABNORMAL
HYALINE CASTS #/AREA URNS LPF: 0 /LPF
IMM GRANULOCYTES # BLD AUTO: 0.02 K/UL (ref 0–0.04)
IMM GRANULOCYTES NFR BLD AUTO: 0.3 % (ref 0–0.5)
INFLUENZA A, MOLECULAR: NEGATIVE
INFLUENZA B, MOLECULAR: NEGATIVE
INR PPP: 1.3 (ref 0.8–1.2)
KETONES UR QL STRIP: NEGATIVE
LACTATE SERPL-SCNC: 1.2 MMOL/L (ref 0.5–2.2)
LACTATE SERPL-SCNC: 2.6 MMOL/L (ref 0.5–2.2)
LEUKOCYTE ESTERASE UR QL STRIP: NEGATIVE
LIPASE SERPL-CCNC: 26 U/L (ref 4–60)
LYMPHOCYTES # BLD AUTO: 0.9 K/UL (ref 1–4.8)
LYMPHOCYTES NFR BLD: 12.5 % (ref 18–48)
MCH RBC QN AUTO: 28.5 PG (ref 27–31)
MCHC RBC AUTO-ENTMCNC: 30.6 G/DL (ref 32–36)
MCV RBC AUTO: 93 FL (ref 82–98)
MICROSCOPIC COMMENT: NORMAL
MONOCYTES # BLD AUTO: 0.8 K/UL (ref 0.3–1)
MONOCYTES NFR BLD: 11.1 % (ref 4–15)
NEUTROPHILS # BLD AUTO: 5.2 K/UL (ref 1.8–7.7)
NEUTROPHILS NFR BLD: 73.8 % (ref 38–73)
NITRITE UR QL STRIP: NEGATIVE
NRBC BLD-RTO: 0 /100 WBC
PCO2 BLDA: 30 MMHG (ref 35–45)
PH SMN: 7.41 [PH] (ref 7.35–7.45)
PH UR STRIP: 6 [PH] (ref 5–8)
PLATELET # BLD AUTO: 163 K/UL (ref 150–450)
PMV BLD AUTO: 10.5 FL (ref 9.2–12.9)
PO2 BLDA: 84 MMHG (ref 75–100)
POC BE: -4.3 MMOL/L (ref -2–2)
POC COHB: 2.3 % (ref 0–3)
POC METHB: 0.8 % (ref 0–1.5)
POC O2HB ARTERIAL: 93 % (ref 94–100)
POC SATURATED O2: 96 % (ref 90–100)
POC TCO2: 19.9 MMOL/L
POC THB: 16 G/DL (ref 12–18)
POTASSIUM SERPL-SCNC: 4.9 MMOL/L (ref 3.5–5.1)
PROT SERPL-MCNC: 7.6 G/DL (ref 6–8.4)
PROT UR QL STRIP: ABNORMAL
PROTHROMBIN TIME: 14.2 SEC (ref 9–12.5)
RBC # BLD AUTO: 5.44 M/UL (ref 4.6–6.2)
RBC #/AREA URNS HPF: 0 /HPF (ref 0–4)
SARS-COV-2 RDRP RESP QL NAA+PROBE: NEGATIVE
SITE: ABNORMAL
SODIUM SERPL-SCNC: 135 MMOL/L (ref 136–145)
SP GR UR STRIP: 1.01 (ref 1–1.03)
SPECIMEN SOURCE: NORMAL
TROPONIN I SERPL DL<=0.01 NG/ML-MCNC: 0.03 NG/ML (ref 0–0.03)
TROPONIN I SERPL DL<=0.01 NG/ML-MCNC: 0.04 NG/ML (ref 0–0.03)
TROPONIN I SERPL DL<=0.01 NG/ML-MCNC: 0.04 NG/ML (ref 0–0.03)
URN SPEC COLLECT METH UR: ABNORMAL
UROBILINOGEN UR STRIP-ACNC: 1 EU/DL
WBC # BLD AUTO: 7.03 K/UL (ref 3.9–12.7)
WBC #/AREA URNS HPF: 3 /HPF (ref 0–5)
YEAST URNS QL MICRO: NORMAL

## 2025-02-24 PROCEDURE — 99900035 HC TECH TIME PER 15 MIN (STAT)

## 2025-02-24 PROCEDURE — 83605 ASSAY OF LACTIC ACID: CPT

## 2025-02-24 PROCEDURE — 27000221 HC OXYGEN, UP TO 24 HOURS

## 2025-02-24 PROCEDURE — 99285 EMERGENCY DEPT VISIT HI MDM: CPT | Mod: 25

## 2025-02-24 PROCEDURE — 84484 ASSAY OF TROPONIN QUANT: CPT | Mod: 91

## 2025-02-24 PROCEDURE — 96366 THER/PROPH/DIAG IV INF ADDON: CPT

## 2025-02-24 PROCEDURE — G0378 HOSPITAL OBSERVATION PER HR: HCPCS

## 2025-02-24 PROCEDURE — 96375 TX/PRO/DX INJ NEW DRUG ADDON: CPT

## 2025-02-24 PROCEDURE — 83605 ASSAY OF LACTIC ACID: CPT | Mod: 91 | Performed by: SURGERY

## 2025-02-24 PROCEDURE — 83690 ASSAY OF LIPASE: CPT

## 2025-02-24 PROCEDURE — 93010 ELECTROCARDIOGRAM REPORT: CPT | Mod: ,,, | Performed by: INTERNAL MEDICINE

## 2025-02-24 PROCEDURE — 36600 WITHDRAWAL OF ARTERIAL BLOOD: CPT

## 2025-02-24 PROCEDURE — 25000003 PHARM REV CODE 250: Performed by: SURGERY

## 2025-02-24 PROCEDURE — 83880 ASSAY OF NATRIURETIC PEPTIDE: CPT

## 2025-02-24 PROCEDURE — 94640 AIRWAY INHALATION TREATMENT: CPT | Mod: XB

## 2025-02-24 PROCEDURE — 63600175 PHARM REV CODE 636 W HCPCS: Performed by: SURGERY

## 2025-02-24 PROCEDURE — 25000242 PHARM REV CODE 250 ALT 637 W/ HCPCS

## 2025-02-24 PROCEDURE — 85730 THROMBOPLASTIN TIME PARTIAL: CPT | Performed by: SURGERY

## 2025-02-24 PROCEDURE — 94799 UNLISTED PULMONARY SVC/PX: CPT

## 2025-02-24 PROCEDURE — 84484 ASSAY OF TROPONIN QUANT: CPT | Mod: 91 | Performed by: SURGERY

## 2025-02-24 PROCEDURE — 87502 INFLUENZA DNA AMP PROBE: CPT

## 2025-02-24 PROCEDURE — 80053 COMPREHEN METABOLIC PANEL: CPT

## 2025-02-24 PROCEDURE — 94640 AIRWAY INHALATION TREATMENT: CPT

## 2025-02-24 PROCEDURE — 87635 SARS-COV-2 COVID-19 AMP PRB: CPT

## 2025-02-24 PROCEDURE — 85610 PROTHROMBIN TIME: CPT | Performed by: SURGERY

## 2025-02-24 PROCEDURE — 85025 COMPLETE CBC W/AUTO DIFF WBC: CPT

## 2025-02-24 PROCEDURE — 94761 N-INVAS EAR/PLS OXIMETRY MLT: CPT | Mod: XB

## 2025-02-24 PROCEDURE — 93005 ELECTROCARDIOGRAM TRACING: CPT

## 2025-02-24 PROCEDURE — 63600175 PHARM REV CODE 636 W HCPCS

## 2025-02-24 PROCEDURE — 25500020 PHARM REV CODE 255

## 2025-02-24 PROCEDURE — 96365 THER/PROPH/DIAG IV INF INIT: CPT | Mod: 59

## 2025-02-24 PROCEDURE — 81000 URINALYSIS NONAUTO W/SCOPE: CPT

## 2025-02-24 PROCEDURE — 99900031 HC PATIENT EDUCATION (STAT)

## 2025-02-24 PROCEDURE — 82803 BLOOD GASES ANY COMBINATION: CPT

## 2025-02-24 PROCEDURE — 25000003 PHARM REV CODE 250

## 2025-02-24 RX ORDER — ALUMINUM HYDROXIDE, MAGNESIUM HYDROXIDE, AND SIMETHICONE 1200; 120; 1200 MG/30ML; MG/30ML; MG/30ML
30 SUSPENSION ORAL ONCE
Status: COMPLETED | OUTPATIENT
Start: 2025-02-24 | End: 2025-02-24

## 2025-02-24 RX ORDER — IPRATROPIUM BROMIDE AND ALBUTEROL SULFATE 2.5; .5 MG/3ML; MG/3ML
3 SOLUTION RESPIRATORY (INHALATION) EVERY 4 HOURS
Status: DISCONTINUED | OUTPATIENT
Start: 2025-02-25 | End: 2025-02-24

## 2025-02-24 RX ORDER — IPRATROPIUM BROMIDE AND ALBUTEROL SULFATE 2.5; .5 MG/3ML; MG/3ML
3 SOLUTION RESPIRATORY (INHALATION) EVERY 4 HOURS PRN
Status: DISCONTINUED | OUTPATIENT
Start: 2025-02-25 | End: 2025-02-25 | Stop reason: HOSPADM

## 2025-02-24 RX ORDER — IPRATROPIUM BROMIDE AND ALBUTEROL SULFATE 2.5; .5 MG/3ML; MG/3ML
3 SOLUTION RESPIRATORY (INHALATION)
Status: COMPLETED | OUTPATIENT
Start: 2025-02-24 | End: 2025-02-24

## 2025-02-24 RX ORDER — LIDOCAINE HYDROCHLORIDE 20 MG/ML
15 SOLUTION OROPHARYNGEAL ONCE
Status: COMPLETED | OUTPATIENT
Start: 2025-02-24 | End: 2025-02-24

## 2025-02-24 RX ORDER — HEPARIN SODIUM,PORCINE/D5W 25000/250
0-40 INTRAVENOUS SOLUTION INTRAVENOUS CONTINUOUS
Status: DISCONTINUED | OUTPATIENT
Start: 2025-02-24 | End: 2025-02-25

## 2025-02-24 RX ORDER — FUROSEMIDE 10 MG/ML
40 INJECTION INTRAMUSCULAR; INTRAVENOUS
Status: COMPLETED | OUTPATIENT
Start: 2025-02-24 | End: 2025-02-24

## 2025-02-24 RX ORDER — ONDANSETRON HYDROCHLORIDE 2 MG/ML
4 INJECTION, SOLUTION INTRAVENOUS
Status: COMPLETED | OUTPATIENT
Start: 2025-02-24 | End: 2025-02-24

## 2025-02-24 RX ADMIN — PIPERACILLIN SODIUM AND TAZOBACTAM SODIUM 4.5 G: 4; .5 INJECTION, POWDER, LYOPHILIZED, FOR SOLUTION INTRAVENOUS at 07:02

## 2025-02-24 RX ADMIN — LIDOCAINE HYDROCHLORIDE 15 ML: 20 SOLUTION ORAL at 04:02

## 2025-02-24 RX ADMIN — HEPARIN SODIUM 18 UNITS/KG/HR: 10000 INJECTION, SOLUTION INTRAVENOUS at 10:02

## 2025-02-24 RX ADMIN — ALUMINUM HYDROXIDE, MAGNESIUM HYDROXIDE, AND DIMETHICONE 30 ML: 200; 20; 200 SUSPENSION ORAL at 04:02

## 2025-02-24 RX ADMIN — FUROSEMIDE 40 MG: 10 INJECTION, SOLUTION INTRAMUSCULAR; INTRAVENOUS at 05:02

## 2025-02-24 RX ADMIN — IPRATROPIUM BROMIDE AND ALBUTEROL SULFATE 3 ML: 2.5; .5 SOLUTION RESPIRATORY (INHALATION) at 06:02

## 2025-02-24 RX ADMIN — IOHEXOL 100 ML: 350 INJECTION, SOLUTION INTRAVENOUS at 05:02

## 2025-02-24 RX ADMIN — ONDANSETRON 4 MG: 2 INJECTION INTRAMUSCULAR; INTRAVENOUS at 04:02

## 2025-02-24 RX ADMIN — IPRATROPIUM BROMIDE AND ALBUTEROL SULFATE 3 ML: 2.5; .5 SOLUTION RESPIRATORY (INHALATION) at 11:02

## 2025-02-24 NOTE — ED PROVIDER NOTES
"Encounter Date: 2/24/2025    Source of History:   Patient and medical record, without language barrier or      Chief complaint:  Abdominal Pain, Fever, and Shortness of Breath    HPI:  Miles Currie is a 59 y.o. male with CHF, CAD, T2DM, dyslipidemia, GERD,   PAD and HTN presented with complaints of abdominal pain, fever and SOB.  Pt states that he has been trouble swallowing both solids and liquids and feels   as if his "throat is swelling up" when he tries to eat. He states it has been going   on for a few weeks and has gotten worse. He states it started with solids and   now it is both solids and liquids. He endorses N/V and fevers and chills x1 day.   Pt denies any changes to his stool during this time period. Pt endorses SOB   with exertion and abdominal pain. Pt also endorses urinary urgency x4 days.     Review of Systems   Constitutional: Negative.    HENT: Negative.     Respiratory:  Positive for shortness of breath.    Cardiovascular:  Positive for chest pain.   Gastrointestinal:  Positive for abdominal pain and nausea.   Genitourinary: Negative.    Musculoskeletal: Negative.    Skin: Negative.    Neurological: Negative.    Endo/Heme/Allergies: Negative.    Psychiatric/Behavioral: Negative.     All other systems reviewed and are negative.    Review of patient's allergies indicates:   Allergen Reactions    Fish oil Swelling    Gabapentin Swelling     Past Medical History:   Diagnosis Date    Angina pectoris     Anticoagulant long-term use     plavix and  pletal    Anxiety and depression     Bipolar disorder     Patient denies    CHF (congestive heart failure)     Chronic bronchitis     Coronary artery disease     Diabetes mellitus     Diabetes with neurologic complications     Dyslipidemia     Encounter for blood transfusion     GERD (gastroesophageal reflux disease)     History of claustrophobia     IF SOMEONE TOUCHES HIS NOSE    Hyperlipidemia     Hypertension     Irregular heartbeat     " Ischemic cardiomyopathy     MI (myocardial infarction) 2009    multiple MI's    Multiple gastric ulcers     Pacemaker     PAD (peripheral artery disease)     Pneumonia     Polyneuropathy     Presence of automatic implantable cardioverter-defibrillator     PVD (peripheral vascular disease)     Respiratory failure     Sleep apnea     does not have machine     Tobacco dependence     Trouble in sleeping     Type 2 diabetes with peripheral circulatory disorder, controlled      Past Surgical History:   Procedure Laterality Date    ABLATION N/A 8/2/2023    Procedure: Ablation;  Surgeon: Ge Benedict MD;  Location: [facility] CATH;  Service: Cardiology;  Laterality: N/A;  *ICD*    ANGIOPLASTY Bilateral     right  and left iliac stents    AORTA - BILATERAL FEMORAL ARTERY BYPASS GRAFT  07/09/2013    APPENDECTOMY      ARTERIAL THROMBECTOMY Right 02/16/2012    iliac artery    ARTERIAL THROMBECTOMY Left 02/23/2012    brachial artery    CARDIAC DEFIBRILLATOR PLACEMENT Right 10/23/2012    [employer]    CORONARY ANGIOPLASTY WITH STENT PLACEMENT      CORONARY ARTERY BYPASS GRAFT  2011    x3    CREATION OF FEMORAL-FEMORAL ARTERY BYPASS WITH GRAFT N/A 3/27/2019    Procedure: CREATION, BYPASS, ARTERIAL, FEMORAL TO FEMORAL, USING GRAFT - RIGHT TO LEFT;  Surgeon: Efren Fung MD;  Location: [facility] OR;  Service: Cardiothoracic;  Laterality: N/A;    ENDARTERECTOMY OF FEMORAL ARTERY Right 3/27/2019    Procedure: ENDARTERECTOMY, FEMORAL;  Surgeon: Efren Fung MD;  Location: [facility] OR;  Service: Cardiothoracic;  Laterality: Right;    EXPLORATION OF FEMORAL ARTERY Left 6/27/2019    Procedure: EXPLORATION, ARTERY, FEMORAL with repair;  Surgeon: Naresh Randolph MD;  Location: [facility] OR;  Service: Cardiovascular;  Laterality: Left;    FINGER SURGERY      amputation right middle finger    LEFT HEART CATHETERIZATION N/A 5/17/2023    Procedure: Left heart cath;  Surgeon: Ezequiel Peña MD;  Location: [facility] CATH;  Service: Cardiology;   Laterality: N/A;    LEFT HEART CATHETERIZATION Left 2/19/2024    Procedure: Left heart cath;  Surgeon: Ezequiel Peña MD;  Location: [facility] CATH;  Service: Cardiology;  Laterality: Left;    PERCUTANEOUS TRANSLUMINAL ANGIOPLASTY (PTA) OF PERIPHERAL VESSEL N/A 1/9/2019    Procedure: PTA, PERIPHERAL VESSEL;  Surgeon: Alex Pittman MD;  Location: [facility] CATH;  Service: Cardiology;  Laterality: N/A;  administer lytic therapy overnight on Wednesday with then plans to touch up other vessels Thursday morning January 10,2019    PERCUTANEOUS TRANSLUMINAL ANGIOPLASTY (PTA) OF PERIPHERAL VESSEL N/A 6/27/2019    Procedure: PTA, PERIPHERAL VESSEL;  Surgeon: Alex Pittman MD;  Location: [facility] CATH;  Service: Cardiology;  Laterality: N/A;    PERCUTANEOUS TRANSLUMINAL ANGIOPLASTY (PTA) OF PERIPHERAL VESSEL N/A 1/30/2020    Procedure: PTA, PERIPHERAL VESSEL;  Surgeon: Alex Pittman MD;  Location: [facility] CATH;  Service: Cardiology;  Laterality: N/A;    REPLACEMENT OF IMPLANTABLE CARDIOVERTER-DEFIBRILLATOR (ICD) GENERATOR N/A 12/15/2021    Procedure: REPLACEMENT, ICD GENERATOR with possible upgrade to CRT;  Surgeon: Houston Pineda MD;  Location: [facility] CATH;  Service: Cardiology;  Laterality: N/A;    WOUND EXPLORATION Left 6/27/2019    Procedure: EXPLORATION, WOUND  Foreign body left femoral;  Surgeon: Naresh Randolph MD;  Location: [facility] OR;  Service: Cardiovascular;  Laterality: Left;     Social History     Socioeconomic History    Marital status: Legally    Tobacco Use    Smoking status: Every Day     Current packs/day: 1.00     Average packs/day: 1 pack/day for 47.1 years (47.1 ttl pk-yrs)     Types: Cigarettes     Start date: 1978     Passive exposure: Current    Smokeless tobacco: Never   Substance and Sexual Activity    Alcohol use: Not Currently    Drug use: No    Sexual activity: Yes     Social Drivers of Health     Financial Resource Strain: High Risk (10/29/2024)    Overall Financial Resource Strain (CARDIA)      Difficulty of Paying Living Expenses: Very hard   Food Insecurity: Food Insecurity Present (10/29/2024)    Hunger Vital Sign     Worried About Running Out of Food in the Last Year: Often true     Ran Out of Food in the Last Year: Often true   Transportation Needs: No Transportation Needs (10/29/2024)    TRANSPORTATION NEEDS     Transportation : No   Physical Activity: Sufficiently Active (3/5/2024)    Exercise Vital Sign     Days of Exercise per Week: 5 days     Minutes of Exercise per Session: 30 min   Stress: Stress Concern Present (3/5/2024)    [employer] - Occupational Stress Questionnaire     Feeling of Stress : Very much   Housing Stability: High Risk (3/5/2024)    Housing Stability Vital Sign     Unable to Pay for Housing in the Last Year: Yes     Number of Places Lived in the Last Year: 2     Unstable Housing in the Last Year: No     Vitals:    /86   Pulse 69   Temp 98 °F (36.7 °C) (Oral)   Resp 18   Wt 86.6 kg (190 lb 14.7 oz)   SpO2 98%   BMI 30.81 kg/m²     Physical Exam  Vitals and nursing note reviewed.   Constitutional:       General: He is not in acute distress.     Appearance: He is not toxic-appearing.   HENT:      Head: Normocephalic and atraumatic.      Mouth/Throat:      Mouth: Mucous membranes are moist.   Eyes:      General: No scleral icterus.  Cardiovascular:      Rate and Rhythm: Normal rate and regular rhythm.      Pulses: Normal pulses.      Heart sounds: Normal heart sounds. No murmur heard.     No friction rub. No gallop.   Pulmonary:      Effort: Pulmonary effort is normal. No respiratory distress.      Breath sounds: No stridor. Wheezing present. No rhonchi or rales.   Abdominal:      General: Abdomen is flat. There is no distension.      Palpations: Abdomen is soft. There is shifting dullness.      Tenderness: There is no abdominal tenderness. There is no guarding.   Musculoskeletal:         General: No swelling or deformity.      Cervical  back: Normal range of motion and neck supple.   Skin:     General: Skin is warm and dry.      Capillary Refill: Capillary refill takes less than 2 seconds.      Coloration: Skin is pale. Skin is not jaundiced.      Findings: Erythema present. No rash.      Comments: Dependent rubor, violaceous discoloration of nose and distal extremities   Neurological:      Mental Status: Mental status is at baseline.       Critical Care    Date/Time: 2/24/2025 11:11 PM    Performed by: Bernard Jimenez MD  Authorized by: Adela Gifford MD  Direct patient critical care time: 25 minutes  Additional history critical care time: 5 minutes  Ordering / reviewing critical care time: 5 minutes  Documentation critical care time: 5 minutes  Consult with family critical care time: 5 minutes  Other critical care time: 5 minutes  Total critical care time (exclusive of procedural time) : 50 minutes  Critical care was necessary to treat or prevent imminent or life-threatening deterioration of the following conditions: cardiac failure.  Critical care was time spent personally by me on the following activities: discussions with consultants, development of treatment plan with patient or surrogate, interpretation of cardiac output measurements, discussions with primary provider, evaluation of patient's response to treatment, examination of patient, ordering and performing treatments and interventions, obtaining history from patient or surrogate, ordering and review of laboratory studies, ordering and review of radiographic studies, re-evaluation of patient's condition and review of old charts.      EKG  EKG performed at 3:48 p.m. on February 24, 2025  Atrial since pacemaker rhythm  No ST elevation  65 beats per minute  Similar when compared to previous EKG  Bernard Jimenez M.D. 11:10 PM 2/24/2025      Laboratory Studies:  Labs Reviewed   CBC W/ AUTO DIFFERENTIAL - Abnormal       Result Value    WBC 7.03      RBC 5.44      Hemoglobin 15.5       Hematocrit 50.7      MCV 93      MCH 28.5      MCHC 30.6 (*)     RDW 15.4 (*)     Platelets 163      MPV 10.5      Immature Granulocytes 0.3      Gran # (ANC) 5.2      Immature Grans (Abs) 0.02      Lymph # 0.9 (*)     Mono # 0.8      Eos # 0.1      Baso # 0.05      nRBC 0      Gran % 73.8 (*)     Lymph % 12.5 (*)     Mono % 11.1      Eosinophil % 1.6      Basophil % 0.7      Differential Method Automated     COMPREHENSIVE METABOLIC PANEL - Abnormal    Sodium 135 (*)     Potassium 4.9      Chloride 103      CO2 21 (*)     Glucose 211 (*)     BUN 14      Creatinine 1.4      Calcium 8.9      Total Protein 7.6      Albumin 3.5      Total Bilirubin 1.1 (*)     Alkaline Phosphatase 435 (*)     AST 62 (*)     ALT 66 (*)     eGFR 58 (*)     Anion Gap 11      Narrative:     Recoll. [id number] by [facility] at 02/24/2025 16:17, reason: Specimen   hemolyzed, E.R. recollecting.   URINALYSIS, REFLEX TO URINE CULTURE - Abnormal    Specimen UA Urine, Clean Catch      Color, UA Yellow      Appearance, UA Clear      pH, UA 6.0      Specific Gravity, UA 1.010      Protein, UA 2+ (*)     Glucose, UA 3+ (*)     Ketones, UA Negative      Bilirubin (UA) Negative      Occult Blood UA Trace (*)     Nitrite, UA Negative      Urobilinogen, UA 1.0      Leukocytes, UA Negative      Narrative:     Specimen Source->Urine   B-TYPE NATRIURETIC PEPTIDE - Abnormal     (*)    TROPONIN I - Abnormal    Troponin I 0.035 (*)     Narrative:     Recoll. [id number] by [facility] at 02/24/2025 16:18, reason: Specimen   hemolyzed, E.R. recollecting.   TROPONIN I - Abnormal    Troponin I 0.027 (*)    LACTIC ACID, PLASMA - Abnormal    Lactate (Lactic Acid) 2.6 (*)     Narrative:     Recoll. [id number] by [facility] at 02/24/2025 18:16, reason: Specimen   hemolyzed, margarita Joseph RN @ E.D.   PROTIME-INR - Abnormal    Prothrombin Time 14.2 (*)     INR 1.3 (*)     Narrative:     (if patient is on warfarin prior to heparin therapy)  Draw baseline aPTT prior to starting  the heparin bolus or  infusion   TROPONIN I - Abnormal    Troponin I 0.038 (*)     Narrative:     (if patient is on warfarin prior to heparin therapy)  Draw baseline aPTT prior to starting the heparin bolus or  infusion   INFLUENZA A & B BY MOLECULAR    Influenza A, Molecular Negative      Influenza B, Molecular Negative      Flu A & B Source Nasal swab     LIPASE    Lipase 26      Narrative:     Recoll. [id number] by [facility] at 02/24/2025 16:17, reason: Specimen   hemolyzed, E.R. recollecting.   SARS-COV-2 RNA AMPLIFICATION, QUAL    SARS-CoV-2 RNA, Amplification, Qual Negative     URINALYSIS MICROSCOPIC    RBC, UA 0      WBC, UA 3      Bacteria Occasional      Yeast, UA None      Hyaline Casts, UA 0      Microscopic Comment SEE COMMENT      Narrative:     Specimen Source->Urine   APTT    aPTT 26.6      Narrative:     (if patient is on warfarin prior to heparin therapy)  Draw baseline aPTT prior to starting the heparin bolus or  infusion   LACTIC ACID, PLASMA    Lactate (Lactic Acid) 1.2       Imaging Results              US Abdomen Limited (Final result)  Result time 02/24/25 22:00:25      Final result by Thony Haynes MD (02/24/25 22:00:25)                   Impression:      There is no sonographic evidence for cholelithiasis, the gallbladder is partially contracted, gallbladder wall thickening may relate to chronic change as well as may relate to contraction, as discussed above, there is no additional sonographic evidence for acute cholecystitis.      Electronically signed by: Thony Haynes  Date:    02/24/2025  Time:    22:00               Narrative:    EXAMINATION:  US ABDOMEN LIMITED    CLINICAL HISTORY:  RUQ pain;    TECHNIQUE:  Limited ultrasound of the right upper quadrant of the abdomen (including pancreas, liver, gallbladder, common bile duct) was performed.    COMPARISON:  Ultrasound abdomen October 16, 2020, CT examination of the abdomen pelvis February 24, 2025    FINDINGS:  The pancreas is not  visualized on this examination, obscured due to bowel gas shadowing, not evaluated on this exam.    The gallbladder is identified, it appears partially contracted.  This diminishes the sensitivity of the exam.  There is no sonographic evidence for cholelithiasis.  The gallbladder wall appears thickened, measuring 7.1 mm, this may in part relate to contraction, note is made this is a similar measurement on the prior ultrasound examination.  There is no abnormal hypervascularity of the gallbladder wall, and the technologist indicates a negative sonographic Iniguez's sign.    There is no abnormal biliary dilatation, the common duct measures approximately 4 mm.    The submitted hepatic length measurement is 16.2 cm, there is no sonographic evidence for focal hepatic mass lesion on submitted imaging.    The right kidney measures approximately 11.9 cm in length, there is no sonographic evidence for hydronephrosis on submitted imaging.                                       CT Abdomen Pelvis With IV Contrast NO Oral Contrast (Final result)  Result time 02/24/25 18:28:12      Final result by Manny Osnua,  (02/24/25 18:28:12)                   Impression:      1. 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  2. Mild abdominal ascites.  3. Contracted gallbladder with mild pericholecystic fluid, likely reactive.  No radiodense gallstone seen.      Electronically signed by: Manny Osuna  Date:    02/24/2025  Time:    18:28               Narrative:    EXAMINATION:  CT ABDOMEN PELVIS WITH IV CONTRAST    CLINICAL HISTORY:  Nausea/vomiting;Epigastric pain;    TECHNIQUE:  Axial CT images with sagittal and coronal reformats were obtained of the abdomen and pelvis from the hemidiaphragms through the symphysis pubis after the administration of 100mL Omnipaque 350.    COMPARISON:  CT of the  abdomen and pelvis from 06/17/2020.    FINDINGS:  Lung Bases: Interval decreased size of a pulmonary nodule in the right lower lobe (series 2, image 29), compatible with a benign process.  No further follow-up needed.  The lung bases are otherwise clear.    Heart: The heart is enlarged.  There is reflux of contrast into the hepatic veins which can be seen with right heart failure.  Pacer leads noted.  No pericardial effusion.    Liver: The liver is normal in size and demonstrates homogeneous enhancement without focal lesion.  The portal vasculature is patent.    Biliary tract: No intrahepatic or extrahepatic biliary ductal dilatation.    Gallbladder: The gallbladder is contracted.  There is no radiodense gallstone.  There is mild pericholecystic fluid, likely reactive.    Pancreas: Normal. No pancreatic ductal dilatation.    Spleen: Normal size without focal lesion.    Adrenals: Unremarkable.    Kidneys and urinary collecting systems: Normal.  No hydronephrosis or urolithiasis.    Lymph nodes: None enlarged.    Stomach and bowel: The stomach is normal.  Loops of small and large bowel are normal in caliber without evidence for inflammation or obstruction.    Peritoneum and mesentery: Mild abdominal ascites.  No abdominal fluid collection.    Vasculature: There are postoperative changes of an aorto bi femoral graft.  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  Severe stenosis of the graft, with the patent portion measuring approximately 0.4 x 1.1 cm.  When compared with the prior study, these findings have progressed.  The left iliac portion of the graft is occluded.  There is a fem-fem bypass graft which is patent.  There is moderate atherosclerosis.  Within the native iliac arteries, there are occluded stents.    Urinary bladder: No wall thickening.    Reproductive organs: The prostate is mildly enlarged.    Body wall: No  abnormality.    Musculoskeletal: No aggressive osseous lesion.                                       X-Ray Chest 1 View (Final result)  Result time 02/24/25 16:28:49      Final result by Mamadou Hidalgo MD (02/24/25 16:28:49)                   Narrative:    EXAMINATION:  XR CHEST 1 VIEW    CLINICAL HISTORY:  shortness of breath;    TECHNIQUE:  Single frontal view of the chest was performed.    COMPARISON:  01/07/2025    FINDINGS:  No new airspace disease.  Unchanged heart size with cardiac conduction device.  Sternal wires.  No pleural effusion or pneumothorax.      Electronically signed by: Mamadou Hidalgo  Date:    02/24/2025  Time:    16:28                                    Medications   piperacillin-tazobactam (ZOSYN) 4.5 g in D5W 100 mL IVPB (MB+) (4.5 g Intravenous New Bag 2/24/25 1912)   heparin 25,000 units in dextrose 5% 250 mL (100 units/mL) infusion HIGH INTENSITY nomogram - [facility] (0 Units/kg/hr × 86.6 kg Intravenous Stopped 2/24/25 2259)   heparin 25,000 units in dextrose 5% (100 units/ml) IV bolus from bag HIGH INTENSITY nomogram - [facility] (has no administration in time range)   heparin 25,000 units in dextrose 5% (100 units/ml) IV bolus from bag HIGH INTENSITY nomogram - [facility] (has no administration in time range)   ondansetron injection 4 mg (4 mg Intravenous Given 2/24/25 1604)   aluminum-magnesium hydroxide-simethicone 200-200-20 mg/5 mL suspension 30 mL (30 mLs Oral Given 2/24/25 1604)     And   LIDOcaine viscous HCl 2% oral solution 15 mL (15 mLs Oral Given 2/24/25 1604)   albuterol-ipratropium 2.5 mg-0.5 mg/3 mL nebulizer solution 3 mL (3 mLs Nebulization Given 2/24/25 1826)   furosemide injection 40 mg (40 mg Intravenous Given 2/24/25 1748)   iohexoL (OMNIPAQUE 350) injection 100 mL (100 mLs Intravenous Given 2/24/25 1740)   heparin 25,000 units in dextrose 5% (100 units/ml) IV bolus from bag HIGH INTENSITY nomogram - [facility] (6,928 Units Intravenous Bolus from Bag 2/24/25 2233)     Medical Decision  Making  Differential includes angina, STEMI, non-STEMI, GERD, muscle pain, pleurisy  Also includes cholelithiasis, cholecystitis, gastroenteritis, bowel obstruction  Also includes congestive heart failure, peripheral vascular disease, chronic pain    Problems Addressed:  Abdominal pain, unspecified abdominal location: complicated acute illness or injury  Chest pain, unspecified type: complicated acute illness or injury  Congestive heart failure, unspecified HF chronicity, unspecified heart failure type: complicated acute illness or injury  Peripheral vascular disease: complicated acute illness or injury  Shortness of breath: complicated acute illness or injury    Amount and/or Complexity of Data Reviewed  Labs: ordered. Decision-making details documented in ED Course.  Radiology: ordered and independent interpretation performed.  ECG/medicine tests: ordered and independent interpretation performed.    ED Management & Risks of Complication, Morbidity, & Mortality:  This is an extremely difficult case, I took over from the daytime physician  Patient has a longstanding issues with peripheral vascular disease & CHF  Patient presented with epigastric pain with no ST elevation on EKG today  Normal white count, mildly elevated lactic acid, will LFT elevation noted now    Patient has a markedly elevated BNP compared to previous BNP  Patient has a stable troponins, mildly elevated x3 in the ER today  Previous ER physician performed with a CT of the chest abdomen pelvis  The CT noted stenosis in the previous aortobifemoral graft & peripherals  This was discussed & reviewed with [facility] physician Dr. Loco  He did not think these occlusions were significant or acute this evening  I discontinued the heparin drip based on his evaluation this evening    Patient has been given IV Lasix for his worsening congestive heart failure  Will admit to [facility] with serial troponins of diuresis/breathing treatments  Will consult   Papi Self for evaluation of congestive heart failure  Will order Morning lab work as well as an echocardiogram on observation    Diagnostic Impression:    Final diagnoses:  [R06.02] Shortness of breath  [R07.9] Chest pain, unspecified type (Primary)  [R10.9] Abdominal pain, unspecified abdominal location  [I73.9] Peripheral vascular disease  [I50.9] Congestive heart failure, unspecified HF chronicity, unspecified heart failure type     ED Disposition Condition    Observation Stable                   Bernard Jimenez MD  02/24/25 [id number]

## 2025-02-24 NOTE — TELEPHONE ENCOUNTER
----- Message from Catherine sent at 2/24/2025  9:23 AM CST -----  Miles HidalgoRN: [id number]: 1965PCP: Darrin Gifford Phone      [phone] Phone      Not on file.Mobile          [phone]: pt has been running fever all weekend and having stomach problems. Pt is requesting an appt with Balta Castaneda,. He is also getting short winded really bad [phone]

## 2025-02-24 NOTE — TELEPHONE ENCOUNTER
Offered pt an appt for Wednesday with another provider. Patient declines and states he will go to ER

## 2025-02-24 NOTE — ED TRIAGE NOTES
C/o intermittent upper abdominal pain and fever that began this weekend. Patient reports unable to eat due to nausea x 1 month. Also c/o SOB with walking.  
DISPLAY PLAN FREE TEXT

## 2025-02-25 VITALS
TEMPERATURE: 98 F | WEIGHT: 190.94 LBS | SYSTOLIC BLOOD PRESSURE: 103 MMHG | BODY MASS INDEX: 30.69 KG/M2 | RESPIRATION RATE: 20 BRPM | DIASTOLIC BLOOD PRESSURE: 61 MMHG | OXYGEN SATURATION: 97 % | HEIGHT: 66 IN | HEART RATE: 60 BPM

## 2025-02-25 PROBLEM — F17.200 TOBACCO USE DISORDER: Status: ACTIVE | Noted: 2025-02-25

## 2025-02-25 PROBLEM — E11.9 DIABETES MELLITUS TYPE 2 IN NONOBESE: Status: ACTIVE | Noted: 2025-02-25

## 2025-02-25 PROBLEM — J81.0 ACUTE PULMONARY EDEMA: Status: ACTIVE | Noted: 2025-02-25

## 2025-02-25 PROBLEM — I50.9 ACUTE EXACERBATION OF CHF (CONGESTIVE HEART FAILURE): Status: ACTIVE | Noted: 2025-02-25

## 2025-02-25 PROBLEM — R79.89 ELEVATED BRAIN NATRIURETIC PEPTIDE (BNP) LEVEL: Status: ACTIVE | Noted: 2025-02-25

## 2025-02-25 PROBLEM — R10.9 ABDOMINAL PAIN: Status: ACTIVE | Noted: 2025-02-25

## 2025-02-25 LAB
ALBUMIN SERPL BCP-MCNC: 3.4 G/DL (ref 3.5–5.2)
ALP SERPL-CCNC: 388 U/L (ref 40–150)
ALT SERPL W/O P-5'-P-CCNC: 60 U/L (ref 10–44)
ANION GAP SERPL CALC-SCNC: 11 MMOL/L (ref 8–16)
ASCENDING AORTA: 2.93 CM
AST SERPL-CCNC: 58 U/L (ref 10–40)
AV INDEX (PROSTH): 0.59
AV MEAN GRADIENT: 3 MMHG
AV PEAK GRADIENT: 6 MMHG
AV VALVE AREA BY VELOCITY RATIO: 1.9 CM²
AV VALVE AREA: 1.7 CM²
AV VELOCITY RATIO: 0.67
BASOPHILS # BLD AUTO: 0.05 K/UL (ref 0–0.2)
BASOPHILS NFR BLD: 0.4 % (ref 0–1.9)
BILIRUB SERPL-MCNC: 1.1 MG/DL (ref 0.1–1)
BUN SERPL-MCNC: 14 MG/DL (ref 6–20)
CALCIUM SERPL-MCNC: 8.8 MG/DL (ref 8.7–10.5)
CHLORIDE SERPL-SCNC: 104 MMOL/L (ref 95–110)
CO2 SERPL-SCNC: 26 MMOL/L (ref 23–29)
CREAT SERPL-MCNC: 1.3 MG/DL (ref 0.5–1.4)
CV ECHO LV RWT: 0.29 CM
DIFFERENTIAL METHOD BLD: ABNORMAL
DOP CALC AO PEAK VEL: 1.2 M/S
DOP CALC AO VTI: 25.1 CM
DOP CALC LVOT AREA: 2.8 CM2
DOP CALC LVOT DIAMETER: 1.9 CM
DOP CALC LVOT PEAK VEL: 0.8 M/S
DOP CALC LVOT STROKE VOLUME: 41.9 CM3
DOP CALC RVOT PEAK VEL: 0.31 M/S
DOP CALC RVOT VTI: 4.9 CM
DOP CALCLVOT PEAK VEL VTI: 14.8 CM
E WAVE DECELERATION TIME: 214 MSEC
E/A RATIO: 2.24
E/E' RATIO: 26 M/S
ECHO LV POSTERIOR WALL: 1 CM (ref 0.6–1.1)
EOSINOPHIL # BLD AUTO: 0 K/UL (ref 0–0.5)
EOSINOPHIL NFR BLD: 0.4 % (ref 0–8)
ERYTHROCYTE [DISTWIDTH] IN BLOOD BY AUTOMATED COUNT: 15.3 % (ref 11.5–14.5)
EST. GFR  (NO RACE VARIABLE): >60 ML/MIN/1.73 M^2
FRACTIONAL SHORTENING: 10 % (ref 28–44)
GLUCOSE SERPL-MCNC: 54 MG/DL (ref 70–110)
HCT VFR BLD AUTO: 48.4 % (ref 40–54)
HGB BLD-MCNC: 14.6 G/DL (ref 14–18)
IMM GRANULOCYTES # BLD AUTO: 0.08 K/UL (ref 0–0.04)
IMM GRANULOCYTES NFR BLD AUTO: 0.7 % (ref 0–0.5)
INTERVENTRICULAR SEPTUM: 0.6 CM (ref 0.6–1.1)
IVC DIAMETER: 1.99 CM
IVRT: 160 MSEC
LA MAJOR: 6.1 CM
LA MINOR: 5.8 CM
LA WIDTH: 4.2 CM
LEFT ATRIUM AREA SYSTOLIC (APICAL 2 CHAMBER): 22.66 CM2
LEFT ATRIUM AREA SYSTOLIC (APICAL 4 CHAMBER): 26.42 CM2
LEFT ATRIUM SIZE: 4.4 CM
LEFT ATRIUM VOLUME INDEX MOD: 42 ML/M2
LEFT ATRIUM VOLUME INDEX: 48 ML/M2
LEFT ATRIUM VOLUME MOD: 82 ML
LEFT ATRIUM VOLUME: 93 CM3
LEFT INTERNAL DIMENSION IN SYSTOLE: 6.3 CM (ref 2.1–4)
LEFT VENTRICLE DIASTOLIC VOLUME INDEX: 129.08 ML/M2
LEFT VENTRICLE DIASTOLIC VOLUME: 253 ML
LEFT VENTRICLE END SYSTOLIC VOLUME APICAL 2 CHAMBER: 69.56 ML
LEFT VENTRICLE END SYSTOLIC VOLUME APICAL 4 CHAMBER: 84.2 ML
LEFT VENTRICLE MASS INDEX: 124.7 G/M2
LEFT VENTRICLE SYSTOLIC VOLUME INDEX: 104.1 ML/M2
LEFT VENTRICLE SYSTOLIC VOLUME: 204 ML
LEFT VENTRICULAR INTERNAL DIMENSION IN DIASTOLE: 7 CM (ref 3.5–6)
LEFT VENTRICULAR MASS: 244.4 G
LV LATERAL E/E' RATIO: 25.8 M/S
LV SEPTAL E/E' RATIO: 25.8 M/S
LVED V (TEICH): 252.55 ML
LVES V (TEICH): 203.59 ML
LVOT MG: 1.31 MMHG
LVOT MV: 0.52 CM/S
LYMPHOCYTES # BLD AUTO: 1.2 K/UL (ref 1–4.8)
LYMPHOCYTES NFR BLD: 10.3 % (ref 18–48)
MCH RBC QN AUTO: 28 PG (ref 27–31)
MCHC RBC AUTO-ENTMCNC: 30.2 G/DL (ref 32–36)
MCV RBC AUTO: 93 FL (ref 82–98)
MONOCYTES # BLD AUTO: 1 K/UL (ref 0.3–1)
MONOCYTES NFR BLD: 8.8 % (ref 4–15)
MV PEAK A VEL: 0.46 M/S
MV PEAK E VEL: 1.03 M/S
MV STENOSIS PRESSURE HALF TIME: 62.12 MS
MV VALVE AREA P 1/2 METHOD: 3.54 CM2
NEUTROPHILS # BLD AUTO: 8.9 K/UL (ref 1.8–7.7)
NEUTROPHILS NFR BLD: 79.4 % (ref 38–73)
NRBC BLD-RTO: 0 /100 WBC
OHS CV RV/LV RATIO: 0.46 CM
OHS QRS DURATION: 148 MS
OHS QRS DURATION: 156 MS
OHS QTC CALCULATION: 522 MS
OHS QTC CALCULATION: 547 MS
PISA TR MAX VEL: 3.2 M/S
PLATELET # BLD AUTO: 196 K/UL (ref 150–450)
PMV BLD AUTO: 10.2 FL (ref 9.2–12.9)
POCT GLUCOSE: 114 MG/DL (ref 70–110)
POCT GLUCOSE: 227 MG/DL (ref 70–110)
POCT GLUCOSE: 59 MG/DL (ref 70–110)
POCT GLUCOSE: 65 MG/DL (ref 70–110)
POCT GLUCOSE: 99 MG/DL (ref 70–110)
POTASSIUM SERPL-SCNC: 4.2 MMOL/L (ref 3.5–5.1)
PROT SERPL-MCNC: 7.3 G/DL (ref 6–8.4)
PULM VEIN S/D RATIO: 2.68
PV MEAN GRADIENT: 0 MMHG
PV MV: 0.47 M/S
PV PEAK D VEL: 0.28 M/S
PV PEAK GRADIENT: 1 MMHG
PV PEAK S VEL: 0.75 M/S
PV PEAK VELOCITY: 0.58 M/S
RA MAJOR: 4.69 CM
RBC # BLD AUTO: 5.21 M/UL (ref 4.6–6.2)
RIGHT VENTRICLE DIASTOLIC BASEL DIMENSION: 3.2 CM
RIGHT VENTRICULAR END-DIASTOLIC DIMENSION: 3.19 CM
SINUS: 2.95 CM
SODIUM SERPL-SCNC: 141 MMOL/L (ref 136–145)
STJ: 2.7 CM
TDI LATERAL: 0.04 M/S
TDI SEPTAL: 0.04 M/S
TDI: 0.04 M/S
TROPONIN I SERPL DL<=0.01 NG/ML-MCNC: 0.04 NG/ML (ref 0–0.03)
TROPONIN I SERPL DL<=0.01 NG/ML-MCNC: 0.04 NG/ML (ref 0–0.03)
WBC # BLD AUTO: 11.19 K/UL (ref 3.9–12.7)
Z-SCORE OF LEFT VENTRICULAR DIMENSION IN END DIASTOLE: 2.21
Z-SCORE OF LEFT VENTRICULAR DIMENSION IN END SYSTOLE: 4.76

## 2025-02-25 PROCEDURE — 25000003 PHARM REV CODE 250: Performed by: INTERNAL MEDICINE

## 2025-02-25 PROCEDURE — 94760 N-INVAS EAR/PLS OXIMETRY 1: CPT

## 2025-02-25 PROCEDURE — G0378 HOSPITAL OBSERVATION PER HR: HCPCS

## 2025-02-25 PROCEDURE — 84484 ASSAY OF TROPONIN QUANT: CPT | Performed by: SURGERY

## 2025-02-25 PROCEDURE — 27000221 HC OXYGEN, UP TO 24 HOURS

## 2025-02-25 PROCEDURE — 25000003 PHARM REV CODE 250: Performed by: SURGERY

## 2025-02-25 PROCEDURE — 36415 COLL VENOUS BLD VENIPUNCTURE: CPT | Performed by: SURGERY

## 2025-02-25 PROCEDURE — 99900035 HC TECH TIME PER 15 MIN (STAT)

## 2025-02-25 PROCEDURE — 96375 TX/PRO/DX INJ NEW DRUG ADDON: CPT

## 2025-02-25 PROCEDURE — 96372 THER/PROPH/DIAG INJ SC/IM: CPT | Performed by: SURGERY

## 2025-02-25 PROCEDURE — 63600175 PHARM REV CODE 636 W HCPCS: Performed by: SURGERY

## 2025-02-25 PROCEDURE — 96366 THER/PROPH/DIAG IV INF ADDON: CPT

## 2025-02-25 PROCEDURE — 85025 COMPLETE CBC W/AUTO DIFF WBC: CPT | Performed by: SURGERY

## 2025-02-25 PROCEDURE — 80053 COMPREHEN METABOLIC PANEL: CPT | Performed by: SURGERY

## 2025-02-25 PROCEDURE — 93005 ELECTROCARDIOGRAM TRACING: CPT

## 2025-02-25 PROCEDURE — 25000242 PHARM REV CODE 250 ALT 637 W/ HCPCS: Performed by: SURGERY

## 2025-02-25 RX ORDER — IBUPROFEN 200 MG
16 TABLET ORAL
Status: DISCONTINUED | OUTPATIENT
Start: 2025-02-25 | End: 2025-02-25 | Stop reason: HOSPADM

## 2025-02-25 RX ORDER — HYDROMORPHONE HYDROCHLORIDE 1 MG/ML
1 INJECTION, SOLUTION INTRAMUSCULAR; INTRAVENOUS; SUBCUTANEOUS EVERY 4 HOURS PRN
Status: DISCONTINUED | OUTPATIENT
Start: 2025-02-25 | End: 2025-02-25 | Stop reason: HOSPADM

## 2025-02-25 RX ORDER — HYDROCODONE BITARTRATE AND ACETAMINOPHEN 5; 325 MG/1; MG/1
1 TABLET ORAL EVERY 4 HOURS PRN
Status: DISCONTINUED | OUTPATIENT
Start: 2025-02-25 | End: 2025-02-25 | Stop reason: HOSPADM

## 2025-02-25 RX ORDER — ATORVASTATIN CALCIUM 80 MG/1
80 TABLET, FILM COATED ORAL DAILY
Status: DISCONTINUED | OUTPATIENT
Start: 2025-02-25 | End: 2025-02-25 | Stop reason: HOSPADM

## 2025-02-25 RX ORDER — CARVEDILOL 3.12 MG/1
6.25 TABLET ORAL 2 TIMES DAILY
Status: DISCONTINUED | OUTPATIENT
Start: 2025-02-25 | End: 2025-02-25 | Stop reason: HOSPADM

## 2025-02-25 RX ORDER — INSULIN GLARGINE 100 [IU]/ML
15 INJECTION, SOLUTION SUBCUTANEOUS NIGHTLY
Status: DISCONTINUED | OUTPATIENT
Start: 2025-02-25 | End: 2025-02-25 | Stop reason: HOSPADM

## 2025-02-25 RX ORDER — INSULIN ASPART 100 [IU]/ML
0-5 INJECTION, SOLUTION INTRAVENOUS; SUBCUTANEOUS
Status: DISCONTINUED | OUTPATIENT
Start: 2025-02-25 | End: 2025-02-25 | Stop reason: HOSPADM

## 2025-02-25 RX ORDER — GLUCAGON 1 MG
1 KIT INJECTION
Status: DISCONTINUED | OUTPATIENT
Start: 2025-02-25 | End: 2025-02-25 | Stop reason: HOSPADM

## 2025-02-25 RX ORDER — SODIUM CHLORIDE 0.9 % (FLUSH) 0.9 %
10 SYRINGE (ML) INJECTION
Status: DISCONTINUED | OUTPATIENT
Start: 2025-02-25 | End: 2025-02-25 | Stop reason: HOSPADM

## 2025-02-25 RX ORDER — ACETAMINOPHEN 325 MG/1
650 TABLET ORAL EVERY 8 HOURS PRN
Status: DISCONTINUED | OUTPATIENT
Start: 2025-02-25 | End: 2025-02-25 | Stop reason: HOSPADM

## 2025-02-25 RX ORDER — IBUPROFEN 200 MG
24 TABLET ORAL
Status: DISCONTINUED | OUTPATIENT
Start: 2025-02-25 | End: 2025-02-25 | Stop reason: HOSPADM

## 2025-02-25 RX ORDER — FUROSEMIDE 10 MG/ML
40 INJECTION INTRAMUSCULAR; INTRAVENOUS EVERY 12 HOURS
Status: DISCONTINUED | OUTPATIENT
Start: 2025-02-25 | End: 2025-02-25 | Stop reason: HOSPADM

## 2025-02-25 RX ORDER — ONDANSETRON HYDROCHLORIDE 2 MG/ML
4 INJECTION, SOLUTION INTRAVENOUS EVERY 8 HOURS PRN
Status: DISCONTINUED | OUTPATIENT
Start: 2025-02-25 | End: 2025-02-25 | Stop reason: HOSPADM

## 2025-02-25 RX ORDER — SPIRONOLACTONE 25 MG/1
25 TABLET ORAL DAILY
Status: DISCONTINUED | OUTPATIENT
Start: 2025-02-25 | End: 2025-02-25 | Stop reason: HOSPADM

## 2025-02-25 RX ORDER — INSULIN ASPART 100 [IU]/ML
8 INJECTION, SOLUTION INTRAVENOUS; SUBCUTANEOUS
Status: DISCONTINUED | OUTPATIENT
Start: 2025-02-25 | End: 2025-02-25 | Stop reason: HOSPADM

## 2025-02-25 RX ORDER — CLOPIDOGREL BISULFATE 75 MG/1
75 TABLET ORAL DAILY
Status: DISCONTINUED | OUTPATIENT
Start: 2025-02-25 | End: 2025-02-25 | Stop reason: HOSPADM

## 2025-02-25 RX ORDER — TALC
6 POWDER (GRAM) TOPICAL NIGHTLY PRN
Status: DISCONTINUED | OUTPATIENT
Start: 2025-02-25 | End: 2025-02-25 | Stop reason: HOSPADM

## 2025-02-25 RX ORDER — AMIODARONE HYDROCHLORIDE 200 MG/1
200 TABLET ORAL DAILY
Status: DISCONTINUED | OUTPATIENT
Start: 2025-02-25 | End: 2025-02-25 | Stop reason: HOSPADM

## 2025-02-25 RX ORDER — SPIRONOLACTONE 25 MG/1
25 TABLET ORAL DAILY
Qty: 30 TABLET | Refills: 0 | Status: SHIPPED | OUTPATIENT
Start: 2025-02-26 | End: 2026-02-26

## 2025-02-25 RX ADMIN — INSULIN ASPART 1 UNITS: 100 INJECTION, SOLUTION INTRAVENOUS; SUBCUTANEOUS at 02:02

## 2025-02-25 RX ADMIN — CLOPIDOGREL BISULFATE 75 MG: 75 TABLET ORAL at 09:02

## 2025-02-25 RX ADMIN — CARVEDILOL 6.25 MG: 3.12 TABLET, FILM COATED ORAL at 09:02

## 2025-02-25 RX ADMIN — PIPERACILLIN SODIUM AND TAZOBACTAM SODIUM 4.5 G: 4; .5 INJECTION, POWDER, LYOPHILIZED, FOR SOLUTION INTRAVENOUS at 12:02

## 2025-02-25 RX ADMIN — APIXABAN 5 MG: 5 TABLET, FILM COATED ORAL at 09:02

## 2025-02-25 RX ADMIN — APIXABAN 5 MG: 5 TABLET, FILM COATED ORAL at 01:02

## 2025-02-25 RX ADMIN — PIPERACILLIN SODIUM AND TAZOBACTAM SODIUM 4.5 G: 4; .5 INJECTION, POWDER, LYOPHILIZED, FOR SOLUTION INTRAVENOUS at 04:02

## 2025-02-25 RX ADMIN — FUROSEMIDE 40 MG: 10 INJECTION, SOLUTION INTRAMUSCULAR; INTRAVENOUS at 06:02

## 2025-02-25 RX ADMIN — AMIODARONE HYDROCHLORIDE 200 MG: 200 TABLET ORAL at 09:02

## 2025-02-25 RX ADMIN — DEXTROSE MONOHYDRATE 25 G: 25 INJECTION, SOLUTION INTRAVENOUS at 06:02

## 2025-02-25 RX ADMIN — ATORVASTATIN CALCIUM 80 MG: 80 TABLET, FILM COATED ORAL at 09:02

## 2025-02-25 RX ADMIN — CARVEDILOL 6.25 MG: 3.12 TABLET, FILM COATED ORAL at 01:02

## 2025-02-25 RX ADMIN — EMPAGLIFLOZIN 10 MG: 10 TABLET, FILM COATED ORAL at 09:02

## 2025-02-25 RX ADMIN — SPIRONOLACTONE 25 MG: 25 TABLET ORAL at 09:02

## 2025-02-25 NOTE — PLAN OF CARE
Problem: Adult Inpatient Plan of Care  Goal: Plan of Care Review  Outcome: Met     Problem: Infection  Goal: Absence of Infection Signs and Symptoms  Outcome: Met     Problem: Diabetes Comorbidity  Goal: Blood Glucose Level Within Targeted Range  Outcome: Progressing

## 2025-02-25 NOTE — ASSESSMENT & PLAN NOTE
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     Continue eliquis, plavix and statin   Discussed with Dr. Self and no acute concerns.  F/u outpatient cardiology.

## 2025-02-25 NOTE — ASSESSMENT & PLAN NOTE
Received GI cocktail in the ED    Abdominal U/s: There is no sonographic evidence for cholelithiasis, the gallbladder is partially contracted, gallbladder wall thickening may relate to chronic change as well as may relate to contraction, as discussed above, there is no additional sonographic evidence for acute cholecystitis.     Abdominal pain is resolved.  Pt will f/u with PCP for further work up

## 2025-02-25 NOTE — CONSULTS
[place] - Med Surg ([facility])  Cardiology  Consult Note    Patient Name: Miles Currie  MRN: [id number]  Admission Date: 2/24/2025  Hospital Length of Stay: 0 days  Code Status: Full Code   Attending Provider: Adela Gifford MD   Consulting Provider: Claudia Brasher NP  Primary Care Physician: Adela Gifford MD  Principal Problem:<principal problem not specified>    Patient information was obtained from patient, past medical records, and ER records.     Inpatient consult to [facility]  Consult performed by: Claudia Brasher NP  Consult ordered by: Bernard Jimenez MD        Subjective:     Chief Complaint:  abd pain, bridger, SOB     HPI: 60 yo male hx ICMO EF 10-15% echo 5/23, CRT-D, prior VT ablation, CAD/CABG, severe PAD with aortobifem bypass.  He presented to ER with c/o throat swelling sensation as well as nausea, vomiting, abd pain, fever, and chills for 1 day.    WBC wnl. Viral swabs negative.  CXR relatively clear.  BNP elevated at 882.   Troponin mildly elevated at 0.03. Trend is essentially flat.   EKG is AV paced.  Lactic slightly elevated  AST/ALT/bili slightly elevated  CT abd showing mild ascites    Tx Lasix 40mg IV q 12-- has diuresed 1.6 liters negative thus far   Reports improvement in SOB.         Past Medical History:   Diagnosis Date    Angina pectoris     Anticoagulant long-term use     plavix and  pletal    Anxiety and depression     Bipolar disorder     Patient denies    CHF (congestive heart failure)     Chronic bronchitis     Coronary artery disease     Diabetes mellitus     Diabetes with neurologic complications     Dyslipidemia     Encounter for blood transfusion     GERD (gastroesophageal reflux disease)     History of claustrophobia     IF SOMEONE TOUCHES HIS NOSE    Hyperlipidemia     Hypertension     Irregular heartbeat     Ischemic cardiomyopathy     MI (myocardial infarction) 2009    multiple MI's    Multiple gastric ulcers     Pacemaker     PAD (peripheral artery  disease)     Pneumonia     Polyneuropathy     Presence of automatic implantable cardioverter-defibrillator     PVD (peripheral vascular disease)     Respiratory failure     Sleep apnea     does not have machine     Tobacco dependence     Trouble in sleeping     Type 2 diabetes with peripheral circulatory disorder, controlled        Past Surgical History:   Procedure Laterality Date    ABLATION N/A 8/2/2023    Procedure: Ablation;  Surgeon: Ge Benedict MD;  Location: [facility] CATH;  Service: Cardiology;  Laterality: N/A;  *ICD*    ANGIOPLASTY Bilateral     right  and left iliac stents    AORTA - BILATERAL FEMORAL ARTERY BYPASS GRAFT  07/09/2013    APPENDECTOMY      ARTERIAL THROMBECTOMY Right 02/16/2012    iliac artery    ARTERIAL THROMBECTOMY Left 02/23/2012    brachial artery    CARDIAC DEFIBRILLATOR PLACEMENT Right 10/23/2012    [employer]    CORONARY ANGIOPLASTY WITH STENT PLACEMENT      CORONARY ARTERY BYPASS GRAFT  2011    x3    CREATION OF FEMORAL-FEMORAL ARTERY BYPASS WITH GRAFT N/A 3/27/2019    Procedure: CREATION, BYPASS, ARTERIAL, FEMORAL TO FEMORAL, USING GRAFT - RIGHT TO LEFT;  Surgeon: Efren Fung MD;  Location: [facility] OR;  Service: Cardiothoracic;  Laterality: N/A;    ENDARTERECTOMY OF FEMORAL ARTERY Right 3/27/2019    Procedure: ENDARTERECTOMY, FEMORAL;  Surgeon: Efren Fung MD;  Location: [facility] OR;  Service: Cardiothoracic;  Laterality: Right;    EXPLORATION OF FEMORAL ARTERY Left 6/27/2019    Procedure: EXPLORATION, ARTERY, FEMORAL with repair;  Surgeon: Naresh Randolph MD;  Location: [facility] OR;  Service: Cardiovascular;  Laterality: Left;    FINGER SURGERY      amputation right middle finger    LEFT HEART CATHETERIZATION N/A 5/17/2023    Procedure: Left heart cath;  Surgeon: Ezequiel Peña MD;  Location: [facility] CATH;  Service: Cardiology;  Laterality: N/A;    LEFT HEART CATHETERIZATION Left 2/19/2024    Procedure: Left heart cath;  Surgeon: Ezequiel Peña MD;  Location: [facility] CATH;   Service: Cardiology;  Laterality: Left;    PERCUTANEOUS TRANSLUMINAL ANGIOPLASTY (PTA) OF PERIPHERAL VESSEL N/A 1/9/2019    Procedure: PTA, PERIPHERAL VESSEL;  Surgeon: Alex Pittman MD;  Location: [facility] CATH;  Service: Cardiology;  Laterality: N/A;  administer lytic therapy overnight on Wednesday with then plans to touch up other vessels Thursday morning January 10,2019    PERCUTANEOUS TRANSLUMINAL ANGIOPLASTY (PTA) OF PERIPHERAL VESSEL N/A 6/27/2019    Procedure: PTA, PERIPHERAL VESSEL;  Surgeon: Alex Pittman MD;  Location: [facility] CATH;  Service: Cardiology;  Laterality: N/A;    PERCUTANEOUS TRANSLUMINAL ANGIOPLASTY (PTA) OF PERIPHERAL VESSEL N/A 1/30/2020    Procedure: PTA, PERIPHERAL VESSEL;  Surgeon: Alex Pittman MD;  Location: [facility] CATH;  Service: Cardiology;  Laterality: N/A;    REPLACEMENT OF IMPLANTABLE CARDIOVERTER-DEFIBRILLATOR (ICD) GENERATOR N/A 12/15/2021    Procedure: REPLACEMENT, ICD GENERATOR with possible upgrade to CRT;  Surgeon: Houston Pineda MD;  Location: [facility] CATH;  Service: Cardiology;  Laterality: N/A;    WOUND EXPLORATION Left 6/27/2019    Procedure: EXPLORATION, WOUND  Foreign body left femoral;  Surgeon: Naresh Randolph MD;  Location: [facility] OR;  Service: Cardiovascular;  Laterality: Left;       Review of patient's allergies indicates:   Allergen Reactions    Fish oil Swelling    Gabapentin Swelling       No current facility-administered medications on file prior to encounter.     Current Outpatient Medications on File Prior to Encounter   Medication Sig    albuterol sulfate 90 mcg/actuation aebs Inhale 180 mcg into the lungs every 4 to 6 hours as needed.    albuterol-ipratropium (DUO-NEB) 2.5 mg-0.5 mg/3 mL nebulizer solution Take 3 mLs by nebulization every 6 (six) hours as needed for Wheezing. Rescue    alirocumab (PRALUENT PEN) 75 mg/mL PnIj Praluent Pen 75 mg/mL subcutaneous pen injector, [RxNorm: 7635335] (Patient not taking: Reported on 1/7/2025)    ALPRAZolam (XANAX) 0.25  "MG tablet Take 0.25 mg by mouth daily as needed.    amiodarone (PACERONE) 200 MG Tab Take 200 mg by mouth once daily.    blood-glucose meter,continuous (DEXCOM G7 ) Misc Check blood sugar continuously    blood-glucose sensor (DEXCOM G7 SENSOR) Abril Check blood sugar continuous    budesonide-formoterol 160-4.5 mcg (SYMBICORT) 160-4.5 mcg/actuation HFAA Inhale 2 puffs into the lungs every 12 (twelve) hours. Controller    buPROPion (WELLBUTRIN SR) 150 MG TBSR 12 hr tablet Wellbutrin  mg tablet, 12 hr sustained-release, [RxNorm: 483669] (Patient not taking: Reported on 1/7/2025)    carvediloL (COREG) 6.25 MG tablet Take 6.25 mg by mouth 2 (two) times daily.    clopidogrel (PLAVIX) 75 mg tablet Take 75 mg by mouth once daily.    ELIQUIS 5 mg Tab Take 1 tablet (5 mg total) by mouth 2 (two) times daily.    empagliflozin (JARDIANCE) 25 mg tablet Take 1 tablet (25 mg total) by mouth once daily.    evolocumab (REPATHA SURECLICK) 140 mg/mL PnIj Inject 140 mg into the skin every 14 (fourteen) days. (Patient not taking: Reported on 1/7/2025)    ezetimibe (ZETIA) 10 mg tablet ezetimibe 10 mg tablet, [RxNorm: 745384] (Patient not taking: Reported on 1/7/2025)    fenofibrate micronized (LOFIBRA) 134 MG Cap Take 134 mg by mouth once daily.    insulin (LANTUS SOLOSTAR U-100 INSULIN) glargine 100 units/mL SubQ pen Inject 40 Units into the skin 2 (two) times a day.    insulin syringe-needle U-100 1 mL 29 gauge x 1/2" Syrg BID    isosorbide mononitrate (IMDUR) 30 MG 24 hr tablet isosorbide mononitrate ER 30 mg tablet,extended release 24 hr, [RxNorm: 426825] (Patient not taking: Reported on 1/7/2025)    mexiletine (MEXITIL) 150 MG Cap Take 1 capsule (150 mg total) by mouth 3 (three) times daily.    ondansetron (ZOFRAN) 4 MG tablet Take 1 tablet (4 mg total) by mouth every 6 (six) hours. (Patient not taking: Reported on 1/7/2025)    pantoprazole (PROTONIX) 40 MG tablet Take 40 mg by mouth once daily.    pen needle, diabetic " "(BD ULTRA-FINE SHORT PEN NEEDLE) 31 gauge x 5/16" Ndle USE AS DIRECTED TWICE DAILY    potassium chloride (K-TAB) 20 mEq Take 20 mEq by mouth Daily.    rosuvastatin (CRESTOR) 40 MG Tab Take 40 mg by mouth once daily.    sacubitriL-valsartan (ENTRESTO) 24-26 mg per tablet Entresto 24 mg-26 mg tablet, [RxNorm: 4889312]    spironolactone (ALDACTONE) 25 MG tablet spironolactone 25 mg tablet, [RxNorm: 538935] (Patient not taking: Reported on 1/7/2025)    tadalafiL (CIALIS) 20 MG Tab Take 20 mg by mouth once daily.    tirzepatide (MOUNJARO) 5 mg/0.5 mL PnIj Inject 5 mg into the skin every 7 days.    torsemide (DEMADEX) 20 MG Tab Take 10 mg by mouth daily as needed.    TRUE METRIX GLUCOSE METER Misc CHECK BS BID    TRUE METRIX GLUCOSE TEST STRIP Strp CHECK BS BID    TRUEPLUS LANCETS 33 gauge Misc CHECK BS BID     Family History       Problem Relation (Age of Onset)    Cancer Mother    Diabetes Daughter    Heart attacks under age 50 Brother, Paternal Grandfather    Heart disease Father    Hyperlipidemia Father    Hypertension Father          Tobacco Use    Smoking status: Every Day     Current packs/day: 1.00     Average packs/day: 1 pack/day for 47.2 years (47.2 ttl pk-yrs)     Types: Cigarettes     Start date: 1978     Passive exposure: Current    Smokeless tobacco: Never   Substance and Sexual Activity    Alcohol use: Not Currently    Drug use: No    Sexual activity: Yes     Review of Systems   Constitutional: Positive for fever.   HENT:  Positive for sore throat.    Cardiovascular:  Positive for dyspnea on exertion. Negative for chest pain.   Respiratory:  Positive for shortness of breath.    Gastrointestinal: Negative.    Genitourinary: Negative.    Neurological: Negative.      Objective:     Vital Signs (Most Recent):  Temp: 98 °F (36.7 °C) (02/25/25 0409)  Pulse: 62 (02/25/25 0806)  Resp: 18 (02/25/25 0806)  BP: 134/87 (02/25/25 0806)  SpO2: 96 % (02/25/25 0806) Vital Signs (24h Range):  Temp:  [97.6 °F (36.4 °C)-98 " "°F (36.7 °C)] 98 °F (36.7 °C)  Pulse:  [] 62  Resp:  [16-22] 18  SpO2:  [67 %-100 %] 96 %  BP: (109-151)/(64-96) 134/87     Weight: 86.6 kg (190 lb 14.7 oz)  Body mass index is 30.81 kg/m².    SpO2: 96 %         Intake/Output Summary (Last 24 hours) at 2/25/2025 0827  Last data filed at 2/25/2025 0824  Gross per 24 hour   Intake 170.08 ml   Output 2200 ml   Net -2029.92 ml       Lines/Drains/Airways       Peripheral Intravenous Line  Duration                  Peripheral IV - Single Lumen 02/24/25 1623 18 G Anterior;Left Forearm <1 day         Peripheral IV - Single Lumen 02/24/25 2223 20 G Right Hand <1 day                    Physical Exam  Vitals and nursing note reviewed.   Constitutional:       Appearance: Normal appearance.   Cardiovascular:      Rate and Rhythm: Normal rate and regular rhythm.      Pulses: Normal pulses.   Pulmonary:      Effort: Pulmonary effort is normal.      Breath sounds: Normal breath sounds.   Abdominal:      General: Abdomen is flat.      Palpations: Abdomen is soft.   Skin:     General: Skin is warm and dry.   Neurological:      General: No focal deficit present.      Mental Status: He is alert and oriented to person, place, and time.         Significant Labs: BMP:   Recent Labs   Lab 02/24/25  1624 02/25/25  0426   * 54*   * 141   K 4.9 4.2    104   CO2 21* 26   BUN 14 14   CREATININE 1.4 1.3   CALCIUM 8.9 8.8   , CMP   Recent Labs   Lab 02/24/25  1624 02/25/25  0426   * 141   K 4.9 4.2    104   CO2 21* 26   * 54*   BUN 14 14   CREATININE 1.4 1.3   CALCIUM 8.9 8.8   PROT 7.6 7.3   ALBUMIN 3.5 3.4*   BILITOT 1.1* 1.1*   ALKPHOS 435* 388*   AST 62* 58*   ALT 66* 60*   ANIONGAP 11 11   , CBC   Recent Labs   Lab 02/24/25  1551 02/25/25  0426   WBC 7.03 11.19   HGB 15.5 14.6   HCT 50.7 48.4    196   , and Troponin No results for input(s): "TROPONINIHS" in the last 48 hours.    Significant Imaging: Echocardiogram: Transthoracic echo (TTE) " complete (Cupid Only): No results found for this or any previous visit.  Assessment and Plan:     Acute on chronic systolic CHF EF 10-15% echo 5/23, repeat pending  Severe ICMO s/p CRTD  CAD/CABG/PCI native Cfx 2/24;occluded SVGs  Elevated troponin--likely related to CMO  Severe PAD s/p aortobifem with graft aneurysm  Abd pain  Fever  Throat discomfort/COPD still smoking  LFT elevation    Plan:  Add Jardiance 10mg po qday  Continue Lasix IV, spironolactone/ coreg/ amio/eliquis/atorva  Cotinue to follow on tele  Accurate I/O  Will review echo  Will follow     There are no hospital problems to display for this patient.      VTE Risk Mitigation (From admission, onward)           Ordered     apixaban tablet 5 mg  2 times daily         02/25/25 0015     IP VTE HIGH RISK PATIENT  Once         02/25/25 0015     Place sequential compression device  Until discontinued         02/25/25 0015                    Thank you for your consult. I will follow-up with patient. Please contact us if you have any additional questions.    Claudia Brasher NP scribe for CLAY Self MD  Cardiology   [place] - Med Surg (3rd Fl)    I have personally interviewed and examined this patient face-to-face, and as the physician. Documented the above plan and rendered all medical decision making for this encounter. I have read and agree with the above documentation unless otherwise noted.

## 2025-02-25 NOTE — PLAN OF CARE
02/25/25 0850   Rounds   Attendance Provider   Discharge Plan A Home   Why the patient remains in the hospital Requires continued medical care   Transition of Care Barriers None     Patient currently on isolation. Falguni Grimes at bedside, discussed plan of care with patient.  Discharge planning initiated. Will continue to follow for duration of stay.

## 2025-02-25 NOTE — HPI
Patient is a 59 year old male with medical history of YOLY, tobacco use disorder, anxiety, depression, HTN, HLD, DM type 2, CHF (Ef 10-15% on echo 2023), CAD, CABG, pacemaker,  and PVD with aortobifemoral bypass who presented to the ED with worsening SOB.  Symptoms started two week ago.  He did not noticed any chest pain, fever, cough and congestion.  No recent medication changes or changes in diet.  However he is unable to get his jardiance that is prescribed.  Patient's symptoms are resolved.      Admitted for SOB secondary to acute on chronic heart failure exacerbation.

## 2025-02-25 NOTE — ASSESSMENT & PLAN NOTE
Patient has Systolic (HFrEF) heart failure that is Acute on chronic. On presentation their CHF was decompensated. Evidence of decompensated CHF on presentation includes: shortness of breath. The etiology of their decompensation is likely medication non-compliance. Most recent BNP and echo results are listed below.  Recent Labs     02/24/25  1551   *     Latest ECHO  No results found for this or any previous visit.    Current Heart Failure Medications  carvediloL tablet 6.25 mg, 2 times daily, Oral  furosemide injection 40 mg, Every 12 hours, Intravenous  spironolactone tablet 25 mg, Daily, Oral  empagliflozin (Jardiance) tablet 10 mg, Daily, Oral    Plan  - Monitor strict I&Os and daily weights.    - Place on telemetry  - Low sodium diet  - Place on fluid restriction of 1.5 L.   - Cardiology has been consulted  - The patient's volume status is at their baseline    Net negative 1.6 L since admission   Echo pending and possible discharge today

## 2025-02-25 NOTE — ASSESSMENT & PLAN NOTE
Patient's FSGs are uncontrolled due to hyperglycemia on current medication regimen.  Last A1c reviewed-   Lab Results   Component Value Date    HGBA1C 10.3 (H) 01/07/2025     Most recent fingerstick glucose reviewed-   Recent Labs   Lab 02/25/25  0557 02/25/25  0623 02/25/25  0744   POCTGLUCOSE 59* 99 114*     Current correctional scale  Low  Maintain anti-hyperglycemic dose as follows-   Antihyperglycemics (From admission, onward)      Start     Stop Route Frequency Ordered    02/25/25 0900  empagliflozin (Jardiance) tablet 10 mg        Question Answer Comment   Does this patient have a diagnosis of heart failure? Yes    Does this patient have type 1 diabetes or diabetic ketoacidosis? No    Does this patient have symptomatic hypotension? No    Is the patient NPO or pending major surgery in next 3 days or less? No        -- Oral Daily 02/25/25 0723    02/25/25 0715  insulin aspart U-100 pen 8 Units         -- SubQ 3 times daily with meals 02/25/25 0015    02/25/25 0030  insulin glargine U-100 (Lantus) pen 15 Units         -- SubQ Nightly 02/25/25 0015    02/25/25 0015  insulin aspart U-100 pen 0-5 Units         -- SubQ Before meals & nightly PRN 02/25/25 0015          Hold Oral hypoglycemics while patient is in the hospital.

## 2025-02-25 NOTE — SUBJECTIVE & OBJECTIVE
Past Medical History:   Diagnosis Date    Angina pectoris     Anticoagulant long-term use     plavix and  pletal    Anxiety and depression     Bipolar disorder     Patient denies    CHF (congestive heart failure)     Chronic bronchitis     Coronary artery disease     Diabetes mellitus     Diabetes with neurologic complications     Dyslipidemia     Encounter for blood transfusion     GERD (gastroesophageal reflux disease)     History of claustrophobia     IF SOMEONE TOUCHES HIS NOSE    Hyperlipidemia     Hypertension     Irregular heartbeat     Ischemic cardiomyopathy     MI (myocardial infarction) 2009    multiple MI's    Multiple gastric ulcers     Pacemaker     PAD (peripheral artery disease)     Pneumonia     Polyneuropathy     Presence of automatic implantable cardioverter-defibrillator     PVD (peripheral vascular disease)     Respiratory failure     Sleep apnea     does not have machine     Tobacco dependence     Trouble in sleeping     Type 2 diabetes with peripheral circulatory disorder, controlled        Past Surgical History:   Procedure Laterality Date    ABLATION N/A 8/2/2023    Procedure: Ablation;  Surgeon: Ge Benedict MD;  Location: [facility] CATH;  Service: Cardiology;  Laterality: N/A;  *ICD*    ANGIOPLASTY Bilateral     right  and left iliac stents    AORTA - BILATERAL FEMORAL ARTERY BYPASS GRAFT  07/09/2013    APPENDECTOMY      ARTERIAL THROMBECTOMY Right 02/16/2012    iliac artery    ARTERIAL THROMBECTOMY Left 02/23/2012    brachial artery    CARDIAC DEFIBRILLATOR PLACEMENT Right 10/23/2012    [employer]    CORONARY ANGIOPLASTY WITH STENT PLACEMENT      CORONARY ARTERY BYPASS GRAFT  2011    x3    CREATION OF FEMORAL-FEMORAL ARTERY BYPASS WITH GRAFT N/A 3/27/2019    Procedure: CREATION, BYPASS, ARTERIAL, FEMORAL TO FEMORAL, USING GRAFT - RIGHT TO LEFT;  Surgeon: Efren Fung MD;  Location: [facility] OR;  Service: Cardiothoracic;  Laterality: N/A;    ENDARTERECTOMY OF FEMORAL ARTERY Right  3/27/2019    Procedure: ENDARTERECTOMY, FEMORAL;  Surgeon: Efren Fung MD;  Location: [facility] OR;  Service: Cardiothoracic;  Laterality: Right;    EXPLORATION OF FEMORAL ARTERY Left 6/27/2019    Procedure: EXPLORATION, ARTERY, FEMORAL with repair;  Surgeon: Naresh Randolph MD;  Location: [facility] OR;  Service: Cardiovascular;  Laterality: Left;    FINGER SURGERY      amputation right middle finger    LEFT HEART CATHETERIZATION N/A 5/17/2023    Procedure: Left heart cath;  Surgeon: Ezequiel Peña MD;  Location: [facility] CATH;  Service: Cardiology;  Laterality: N/A;    LEFT HEART CATHETERIZATION Left 2/19/2024    Procedure: Left heart cath;  Surgeon: Ezequiel Peña MD;  Location: [facility] CATH;  Service: Cardiology;  Laterality: Left;    PERCUTANEOUS TRANSLUMINAL ANGIOPLASTY (PTA) OF PERIPHERAL VESSEL N/A 1/9/2019    Procedure: PTA, PERIPHERAL VESSEL;  Surgeon: Alex Pittman MD;  Location: [facility] CATH;  Service: Cardiology;  Laterality: N/A;  administer lytic therapy overnight on Wednesday with then plans to touch up other vessels Thursday morning January 10,2019    PERCUTANEOUS TRANSLUMINAL ANGIOPLASTY (PTA) OF PERIPHERAL VESSEL N/A 6/27/2019    Procedure: PTA, PERIPHERAL VESSEL;  Surgeon: Alex Pittman MD;  Location: [facility] CATH;  Service: Cardiology;  Laterality: N/A;    PERCUTANEOUS TRANSLUMINAL ANGIOPLASTY (PTA) OF PERIPHERAL VESSEL N/A 1/30/2020    Procedure: PTA, PERIPHERAL VESSEL;  Surgeon: Alex Pittman MD;  Location: [facility] CATH;  Service: Cardiology;  Laterality: N/A;    REPLACEMENT OF IMPLANTABLE CARDIOVERTER-DEFIBRILLATOR (ICD) GENERATOR N/A 12/15/2021    Procedure: REPLACEMENT, ICD GENERATOR with possible upgrade to CRT;  Surgeon: Houston Pineda MD;  Location: [facility] CATH;  Service: Cardiology;  Laterality: N/A;    WOUND EXPLORATION Left 6/27/2019    Procedure: EXPLORATION, WOUND  Foreign body left femoral;  Surgeon: Naresh Randolph MD;  Location: [facility] OR;  Service: Cardiovascular;  Laterality: Left;        Review of patient's allergies indicates:   Allergen Reactions    Fish oil Swelling    Gabapentin Swelling       No current facility-administered medications on file prior to encounter.     Current Outpatient Medications on File Prior to Encounter   Medication Sig    albuterol sulfate 90 mcg/actuation aebs Inhale 180 mcg into the lungs every 4 to 6 hours as needed.    albuterol-ipratropium (DUO-NEB) 2.5 mg-0.5 mg/3 mL nebulizer solution Take 3 mLs by nebulization every 6 (six) hours as needed for Wheezing. Rescue    alirocumab (PRALUENT PEN) 75 mg/mL PnIj Praluent Pen 75 mg/mL subcutaneous pen injector, [RxNorm: 4600005] (Patient not taking: Reported on 1/7/2025)    ALPRAZolam (XANAX) 0.25 MG tablet Take 0.25 mg by mouth daily as needed.    amiodarone (PACERONE) 200 MG Tab Take 200 mg by mouth once daily.    blood-glucose meter,continuous (DEXCOM G7 ) Misc Check blood sugar continuously    blood-glucose sensor (DEXCOM G7 SENSOR) Abril Check blood sugar continuous    budesonide-formoterol 160-4.5 mcg (SYMBICORT) 160-4.5 mcg/actuation HFAA Inhale 2 puffs into the lungs every 12 (twelve) hours. Controller    buPROPion (WELLBUTRIN SR) 150 MG TBSR 12 hr tablet Wellbutrin  mg tablet, 12 hr sustained-release, [RxNorm: 354823] (Patient not taking: Reported on 1/7/2025)    carvediloL (COREG) 6.25 MG tablet Take 6.25 mg by mouth 2 (two) times daily.    clopidogrel (PLAVIX) 75 mg tablet Take 75 mg by mouth once daily.    ELIQUIS 5 mg Tab Take 1 tablet (5 mg total) by mouth 2 (two) times daily.    empagliflozin (JARDIANCE) 25 mg tablet Take 1 tablet (25 mg total) by mouth once daily.    evolocumab (REPATHA SURECLICK) 140 mg/mL PnIj Inject 140 mg into the skin every 14 (fourteen) days. (Patient not taking: Reported on 1/7/2025)    ezetimibe (ZETIA) 10 mg tablet ezetimibe 10 mg tablet, [RxNorm: 874436] (Patient not taking: Reported on 1/7/2025)    fenofibrate micronized (LOFIBRA) 134 MG Cap Take 134 mg by mouth  "once daily.    insulin (LANTUS SOLOSTAR U-100 INSULIN) glargine 100 units/mL SubQ pen Inject 40 Units into the skin 2 (two) times a day.    insulin syringe-needle U-100 1 mL 29 gauge x 1/2" Syrg BID    isosorbide mononitrate (IMDUR) 30 MG 24 hr tablet isosorbide mononitrate ER 30 mg tablet,extended release 24 hr, [RxNorm: 196650] (Patient not taking: Reported on 1/7/2025)    mexiletine (MEXITIL) 150 MG Cap Take 1 capsule (150 mg total) by mouth 3 (three) times daily.    ondansetron (ZOFRAN) 4 MG tablet Take 1 tablet (4 mg total) by mouth every 6 (six) hours. (Patient not taking: Reported on 1/7/2025)    pantoprazole (PROTONIX) 40 MG tablet Take 40 mg by mouth once daily.    pen needle, diabetic (BD ULTRA-FINE SHORT PEN NEEDLE) 31 gauge x 5/16" Ndle USE AS DIRECTED TWICE DAILY    potassium chloride (K-TAB) 20 mEq Take 20 mEq by mouth Daily.    rosuvastatin (CRESTOR) 40 MG Tab Take 40 mg by mouth once daily.    sacubitriL-valsartan (ENTRESTO) 24-26 mg per tablet Entresto 24 mg-26 mg tablet, [RxNorm: 9811837]    spironolactone (ALDACTONE) 25 MG tablet spironolactone 25 mg tablet, [RxNorm: 591204] (Patient not taking: Reported on 1/7/2025)    tadalafiL (CIALIS) 20 MG Tab Take 20 mg by mouth once daily.    tirzepatide (MOUNJARO) 5 mg/0.5 mL PnIj Inject 5 mg into the skin every 7 days.    torsemide (DEMADEX) 20 MG Tab Take 10 mg by mouth daily as needed.    TRUE METRIX GLUCOSE METER Misc CHECK BS BID    TRUE METRIX GLUCOSE TEST STRIP Strp CHECK BS BID    TRUEPLUS LANCETS 33 gauge Misc CHECK BS BID     Family History       Problem Relation (Age of Onset)    Cancer Mother    Diabetes Daughter    Heart attacks under age 50 Brother, Paternal Grandfather    Heart disease Father    Hyperlipidemia Father    Hypertension Father          Tobacco Use    Smoking status: Every Day     Current packs/day: 1.00     Average packs/day: 1 pack/day for 47.2 years (47.2 ttl pk-yrs)     Types: Cigarettes     Start date: 1978     Passive " exposure: Current    Smokeless tobacco: Never   Substance and Sexual Activity    Alcohol use: Not Currently    Drug use: No    Sexual activity: Yes     Review of Systems   Constitutional:  Negative for chills and fever.   HENT:  Negative for ear discharge and ear pain.    Eyes:  Negative for pain and discharge.   Respiratory:  Positive for shortness of breath. Negative for cough.    Cardiovascular:  Negative for chest pain and leg swelling.   Gastrointestinal:  Negative for nausea and vomiting.   Endocrine: Negative for cold intolerance and heat intolerance.   Genitourinary:  Negative for difficulty urinating and dysuria.   Musculoskeletal:  Negative for joint swelling and myalgias.   Skin:  Negative for rash and wound.   Neurological:  Negative for dizziness and headaches.   Psychiatric/Behavioral:  Negative for agitation and confusion.      Objective:     Vital Signs (Most Recent):  Temp: 96.1 °F (35.6 °C) (02/25/25 0806)  Pulse: (!) 59 (02/25/25 1024)  Resp: 18 (02/25/25 0806)  BP: 134/87 (02/25/25 0806)  SpO2: 96 % (02/25/25 0806) Vital Signs (24h Range):  Temp:  [96.1 °F (35.6 °C)-98 °F (36.7 °C)] 96.1 °F (35.6 °C)  Pulse:  [] 59  Resp:  [16-22] 18  SpO2:  [67 %-100 %] 96 %  BP: (109-151)/(64-96) 134/87     Weight: 86.6 kg (190 lb 14.7 oz)  Body mass index is 30.81 kg/m².     Physical Exam  Constitutional:       General: He is not in acute distress.  HENT:      Head: Normocephalic and atraumatic.   Eyes:      General:         Right eye: No discharge.         Left eye: No discharge.   Cardiovascular:      Rate and Rhythm: Normal rate and regular rhythm.   Pulmonary:      Effort: Pulmonary effort is normal.      Breath sounds: Normal breath sounds.   Abdominal:      General: There is no distension.      Tenderness: There is no abdominal tenderness.   Musculoskeletal:         General: No swelling or tenderness.      Cervical back: Neck supple. No tenderness.      Right lower leg: No edema.      Left lower  "leg: No edema.   Skin:     General: Skin is warm and dry.   Neurological:      General: No focal deficit present.      Mental Status: He is alert and oriented to person, place, and time.   Psychiatric:         Mood and Affect: Mood normal.         Behavior: Behavior normal.                Significant Labs: A1C:   Recent Labs   Lab 09/17/24  1012 01/07/25  1035   HGBA1C 9.2* 10.3*     ABGs:   Recent Labs   Lab 02/24/25  1826   PH 7.410   PCO2 30*   HCO3 19.00*   POCSATURATED 96.0   BE -4.30*   TOTALHB 16.0   COHB 2.3   METHB 0.8   PO2 84     Bilirubin:   Recent Labs   Lab 02/24/25  1624 02/25/25  0426   BILITOT 1.1* 1.1*     Blood Culture: No results for input(s): "LABBLOO" in the last 48 hours.  BMP:   Recent Labs   Lab 02/25/25  0426   GLU 54*      K 4.2      CO2 26   BUN 14   CREATININE 1.3   CALCIUM 8.8     CBC:   Recent Labs   Lab 02/24/25  1551 02/25/25  0426   WBC 7.03 11.19   HGB 15.5 14.6   HCT 50.7 48.4    196     CMP:   Recent Labs   Lab 02/24/25  1624 02/25/25  0426   * 141   K 4.9 4.2    104   CO2 21* 26   * 54*   BUN 14 14   CREATININE 1.4 1.3   CALCIUM 8.9 8.8   PROT 7.6 7.3   ALBUMIN 3.5 3.4*   BILITOT 1.1* 1.1*   ALKPHOS 435* 388*   AST 62* 58*   ALT 66* 60*   ANIONGAP 11 11     Cardiac Markers:   Recent Labs   Lab 02/24/25  1551   *     Coagulation:   Recent Labs   Lab 02/24/25  2220   INR 1.3*   APTT 26.6     Lactic Acid:   Recent Labs   Lab 02/24/25  1818 02/24/25  2220   LACTATE 2.6* 1.2     Lipase:   Recent Labs   Lab 02/24/25  1624   LIPASE 26     Lipid Panel: No results for input(s): "CHOL", "HDL", "LDLCALC", "TRIG", "CHOLHDL" in the last 48 hours.  Magnesium: No results for input(s): "MG" in the last 48 hours.  POCT Glucose:   Recent Labs   Lab 02/25/25  0557 02/25/25  0623 02/25/25  0744   POCTGLUCOSE 59* 99 114*     Prealbumin: No results for input(s): "PREALBUMIN" in the last 48 hours.  Respiratory Culture: No results for input(s): "GSRESP", " ""RESPIRATORYC" in the last 48 hours.  Troponin:   Recent Labs   Lab 02/24/25  2220 02/25/25  0426 02/25/25  1020   TROPONINI 0.038* 0.045* 0.039*     TSH:   Recent Labs   Lab 09/17/24  1012   TSH 2.897     Urine Culture: No results for input(s): "LABURIN" in the last 48 hours.  Urine Studies:   Recent Labs   Lab 02/24/25  1810   COLORU Yellow   APPEARANCEUA Clear   PHUR 6.0   SPECGRAV 1.010   PROTEINUA 2+*   GLUCUA 3+*   KETONESU Negative   BILIRUBINUA Negative   OCCULTUA Trace*   NITRITE Negative   UROBILINOGEN 1.0   LEUKOCYTESUR Negative   RBCUA 0   WBCUA 3   BACTERIA Occasional   HYALINECASTS 0       Significant Imaging: I have reviewed all pertinent imaging results/findings within the past 24 hours.  "

## 2025-02-25 NOTE — H&P
[facility] ([address] [place] [address] Medicine  History & Physical    Patient Name: Miles Currie  MRN: [id number]  Patient Class: OP- Observation  Admission Date: 2/24/2025  Attending Physician: Adela Gifford MD   Primary Care Provider: Adela Gifford MD         Patient information was obtained from patient and ER records.     Subjective:     Principal Problem:<principal problem not specified>    Chief Complaint:   Chief Complaint   Patient presents with    Abdominal Pain    Fever    Shortness of Breath        HPI: Patient is a 59 year old male with medical history of YOLY, tobacco use disorder, anxiety, depression, HTN, HLD, DM type 2, CHF (Ef 10-15% on echo 2023), CAD, CABG, pacemaker,  and PVD with aortobifemoral bypass who presented to the ED with worsening SOB.  Symptoms started two week ago.  He did not noticed any chest pain, fever, cough and congestion.  No recent medication changes or changes in diet.  However he is unable to get his jardiance that is prescribed.  Patient's symptoms are resolved.      Admitted for SOB secondary to acute on chronic heart failure exacerbation.                Past Medical History:   Diagnosis Date    Angina pectoris     Anticoagulant long-term use     plavix and  pletal    Anxiety and depression     Bipolar disorder     Patient denies    CHF (congestive heart failure)     Chronic bronchitis     Coronary artery disease     Diabetes mellitus     Diabetes with neurologic complications     Dyslipidemia     Encounter for blood transfusion     GERD (gastroesophageal reflux disease)     History of claustrophobia     IF SOMEONE TOUCHES HIS NOSE    Hyperlipidemia     Hypertension     Irregular heartbeat     Ischemic cardiomyopathy     MI (myocardial infarction) 2009    multiple MI's    Multiple gastric ulcers     Pacemaker     PAD (peripheral artery disease)     Pneumonia     Polyneuropathy     Presence of automatic implantable cardioverter-defibrillator     PVD (peripheral vascular  disease)     Respiratory failure     Sleep apnea     does not have machine     Tobacco dependence     Trouble in sleeping     Type 2 diabetes with peripheral circulatory disorder, controlled        Past Surgical History:   Procedure Laterality Date    ABLATION N/A 8/2/2023    Procedure: Ablation;  Surgeon: Ge Benedict MD;  Location: [facility] CATH;  Service: Cardiology;  Laterality: N/A;  *ICD*    ANGIOPLASTY Bilateral     right  and left iliac stents    AORTA - BILATERAL FEMORAL ARTERY BYPASS GRAFT  07/09/2013    APPENDECTOMY      ARTERIAL THROMBECTOMY Right 02/16/2012    iliac artery    ARTERIAL THROMBECTOMY Left 02/23/2012    brachial artery    CARDIAC DEFIBRILLATOR PLACEMENT Right 10/23/2012    [employer]    CORONARY ANGIOPLASTY WITH STENT PLACEMENT      CORONARY ARTERY BYPASS GRAFT  2011    x3    CREATION OF FEMORAL-FEMORAL ARTERY BYPASS WITH GRAFT N/A 3/27/2019    Procedure: CREATION, BYPASS, ARTERIAL, FEMORAL TO FEMORAL, USING GRAFT - RIGHT TO LEFT;  Surgeon: Efren Fung MD;  Location: [facility] OR;  Service: Cardiothoracic;  Laterality: N/A;    ENDARTERECTOMY OF FEMORAL ARTERY Right 3/27/2019    Procedure: ENDARTERECTOMY, FEMORAL;  Surgeon: Efren Fung MD;  Location: [facility] OR;  Service: Cardiothoracic;  Laterality: Right;    EXPLORATION OF FEMORAL ARTERY Left 6/27/2019    Procedure: EXPLORATION, ARTERY, FEMORAL with repair;  Surgeon: Naresh Randolph MD;  Location: [facility] OR;  Service: Cardiovascular;  Laterality: Left;    FINGER SURGERY      amputation right middle finger    LEFT HEART CATHETERIZATION N/A 5/17/2023    Procedure: Left heart cath;  Surgeon: Ezequiel Peña MD;  Location: [facility] CATH;  Service: Cardiology;  Laterality: N/A;    LEFT HEART CATHETERIZATION Left 2/19/2024    Procedure: Left heart cath;  Surgeon: Ezequiel Peña MD;  Location: [facility] CATH;  Service: Cardiology;  Laterality: Left;    PERCUTANEOUS TRANSLUMINAL ANGIOPLASTY (PTA) OF PERIPHERAL VESSEL N/A 1/9/2019    Procedure: PTA,  PERIPHERAL VESSEL;  Surgeon: Alex Pittman MD;  Location: [facility] CATH;  Service: Cardiology;  Laterality: N/A;  administer lytic therapy overnight on Wednesday with then plans to touch up other vessels Thursday morning January 10,2019    PERCUTANEOUS TRANSLUMINAL ANGIOPLASTY (PTA) OF PERIPHERAL VESSEL N/A 6/27/2019    Procedure: PTA, PERIPHERAL VESSEL;  Surgeon: Alxe Pittman MD;  Location: [facility] CATH;  Service: Cardiology;  Laterality: N/A;    PERCUTANEOUS TRANSLUMINAL ANGIOPLASTY (PTA) OF PERIPHERAL VESSEL N/A 1/30/2020    Procedure: PTA, PERIPHERAL VESSEL;  Surgeon: Alex Pittman MD;  Location: [facility] CATH;  Service: Cardiology;  Laterality: N/A;    REPLACEMENT OF IMPLANTABLE CARDIOVERTER-DEFIBRILLATOR (ICD) GENERATOR N/A 12/15/2021    Procedure: REPLACEMENT, ICD GENERATOR with possible upgrade to CRT;  Surgeon: Houston Pineda MD;  Location: [facility] CATH;  Service: Cardiology;  Laterality: N/A;    WOUND EXPLORATION Left 6/27/2019    Procedure: EXPLORATION, WOUND  Foreign body left femoral;  Surgeon: Naresh Randolph MD;  Location: [facility] OR;  Service: Cardiovascular;  Laterality: Left;       Review of patient's allergies indicates:   Allergen Reactions    Fish oil Swelling    Gabapentin Swelling       No current facility-administered medications on file prior to encounter.     Current Outpatient Medications on File Prior to Encounter   Medication Sig    albuterol sulfate 90 mcg/actuation aebs Inhale 180 mcg into the lungs every 4 to 6 hours as needed.    albuterol-ipratropium (DUO-NEB) 2.5 mg-0.5 mg/3 mL nebulizer solution Take 3 mLs by nebulization every 6 (six) hours as needed for Wheezing. Rescue    alirocumab (PRALUENT PEN) 75 mg/mL PnIj Praluent Pen 75 mg/mL subcutaneous pen injector, [RxNorm: 1810210] (Patient not taking: Reported on 1/7/2025)    ALPRAZolam (XANAX) 0.25 MG tablet Take 0.25 mg by mouth daily as needed.    amiodarone (PACERONE) 200 MG Tab Take 200 mg by mouth once daily.    blood-glucose  "meter,continuous (DEXCOM G7 ) Misc Check blood sugar continuously    blood-glucose sensor (DEXCOM G7 SENSOR) Abril Check blood sugar continuous    budesonide-formoterol 160-4.5 mcg (SYMBICORT) 160-4.5 mcg/actuation HFAA Inhale 2 puffs into the lungs every 12 (twelve) hours. Controller    buPROPion (WELLBUTRIN SR) 150 MG TBSR 12 hr tablet Wellbutrin  mg tablet, 12 hr sustained-release, [RxNorm: 983816] (Patient not taking: Reported on 1/7/2025)    carvediloL (COREG) 6.25 MG tablet Take 6.25 mg by mouth 2 (two) times daily.    clopidogrel (PLAVIX) 75 mg tablet Take 75 mg by mouth once daily.    ELIQUIS 5 mg Tab Take 1 tablet (5 mg total) by mouth 2 (two) times daily.    empagliflozin (JARDIANCE) 25 mg tablet Take 1 tablet (25 mg total) by mouth once daily.    evolocumab (REPATHA SURECLICK) 140 mg/mL PnIj Inject 140 mg into the skin every 14 (fourteen) days. (Patient not taking: Reported on 1/7/2025)    ezetimibe (ZETIA) 10 mg tablet ezetimibe 10 mg tablet, [RxNorm: 058930] (Patient not taking: Reported on 1/7/2025)    fenofibrate micronized (LOFIBRA) 134 MG Cap Take 134 mg by mouth once daily.    insulin (LANTUS SOLOSTAR U-100 INSULIN) glargine 100 units/mL SubQ pen Inject 40 Units into the skin 2 (two) times a day.    insulin syringe-needle U-100 1 mL 29 gauge x 1/2" Syrg BID    isosorbide mononitrate (IMDUR) 30 MG 24 hr tablet isosorbide mononitrate ER 30 mg tablet,extended release 24 hr, [RxNorm: 450828] (Patient not taking: Reported on 1/7/2025)    mexiletine (MEXITIL) 150 MG Cap Take 1 capsule (150 mg total) by mouth 3 (three) times daily.    ondansetron (ZOFRAN) 4 MG tablet Take 1 tablet (4 mg total) by mouth every 6 (six) hours. (Patient not taking: Reported on 1/7/2025)    pantoprazole (PROTONIX) 40 MG tablet Take 40 mg by mouth once daily.    pen needle, diabetic (BD ULTRA-FINE SHORT PEN NEEDLE) 31 gauge x 5/16" Ndle USE AS DIRECTED TWICE DAILY    potassium chloride (K-TAB) 20 mEq Take 20 mEq by " mouth Daily.    rosuvastatin (CRESTOR) 40 MG Tab Take 40 mg by mouth once daily.    sacubitriL-valsartan (ENTRESTO) 24-26 mg per tablet Entresto 24 mg-26 mg tablet, [RxNorm: 9339126]    spironolactone (ALDACTONE) 25 MG tablet spironolactone 25 mg tablet, [RxNorm: 314863] (Patient not taking: Reported on 1/7/2025)    tadalafiL (CIALIS) 20 MG Tab Take 20 mg by mouth once daily.    tirzepatide (MOUNJARO) 5 mg/0.5 mL PnIj Inject 5 mg into the skin every 7 days.    torsemide (DEMADEX) 20 MG Tab Take 10 mg by mouth daily as needed.    TRUE METRIX GLUCOSE METER Misc CHECK BS BID    TRUE METRIX GLUCOSE TEST STRIP Strp CHECK BS BID    TRUEPLUS LANCETS 33 gauge Misc CHECK BS BID     Family History       Problem Relation (Age of Onset)    Cancer Mother    Diabetes Daughter    Heart attacks under age 50 Brother, Paternal Grandfather    Heart disease Father    Hyperlipidemia Father    Hypertension Father          Tobacco Use    Smoking status: Every Day     Current packs/day: 1.00     Average packs/day: 1 pack/day for 47.2 years (47.2 ttl pk-yrs)     Types: Cigarettes     Start date: 1978     Passive exposure: Current    Smokeless tobacco: Never   Substance and Sexual Activity    Alcohol use: Not Currently    Drug use: No    Sexual activity: Yes     Review of Systems   Constitutional:  Negative for chills and fever.   HENT:  Negative for ear discharge and ear pain.    Eyes:  Negative for pain and discharge.   Respiratory:  Positive for shortness of breath. Negative for cough.    Cardiovascular:  Negative for chest pain and leg swelling.   Gastrointestinal:  Negative for nausea and vomiting.   Endocrine: Negative for cold intolerance and heat intolerance.   Genitourinary:  Negative for difficulty urinating and dysuria.   Musculoskeletal:  Negative for joint swelling and myalgias.   Skin:  Negative for rash and wound.   Neurological:  Negative for dizziness and headaches.   Psychiatric/Behavioral:  Negative for agitation and  "confusion.      Objective:     Vital Signs (Most Recent):  Temp: 96.1 °F (35.6 °C) (02/25/25 0806)  Pulse: (!) 59 (02/25/25 1024)  Resp: 18 (02/25/25 0806)  BP: 134/87 (02/25/25 0806)  SpO2: 96 % (02/25/25 0806) Vital Signs (24h Range):  Temp:  [96.1 °F (35.6 °C)-98 °F (36.7 °C)] 96.1 °F (35.6 °C)  Pulse:  [] 59  Resp:  [16-22] 18  SpO2:  [67 %-100 %] 96 %  BP: (109-151)/(64-96) 134/87     Weight: 86.6 kg (190 lb 14.7 oz)  Body mass index is 30.81 kg/m².     Physical Exam  Constitutional:       General: He is not in acute distress.  HENT:      Head: Normocephalic and atraumatic.   Eyes:      General:         Right eye: No discharge.         Left eye: No discharge.   Cardiovascular:      Rate and Rhythm: Normal rate and regular rhythm.   Pulmonary:      Effort: Pulmonary effort is normal.      Breath sounds: Normal breath sounds.   Abdominal:      General: There is no distension.      Tenderness: There is no abdominal tenderness.   Musculoskeletal:         General: No swelling or tenderness.      Cervical back: Neck supple. No tenderness.      Right lower leg: No edema.      Left lower leg: No edema.   Skin:     General: Skin is warm and dry.   Neurological:      General: No focal deficit present.      Mental Status: He is alert and oriented to person, place, and time.   Psychiatric:         Mood and Affect: Mood normal.         Behavior: Behavior normal.                Significant Labs: A1C:   Recent Labs   Lab 09/17/24  1012 01/07/25  1035   HGBA1C 9.2* 10.3*     ABGs:   Recent Labs   Lab 02/24/25  1826   PH 7.410   PCO2 30*   HCO3 19.00*   POCSATURATED 96.0   BE -4.30*   TOTALHB 16.0   COHB 2.3   METHB 0.8   PO2 84     Bilirubin:   Recent Labs   Lab 02/24/25  1624 02/25/25  0426   BILITOT 1.1* 1.1*     Blood Culture: No results for input(s): "LABBLOO" in the last 48 hours.  BMP:   Recent Labs   Lab 02/25/25  0426   GLU 54*      K 4.2      CO2 26   BUN 14   CREATININE 1.3   CALCIUM 8.8     CBC: " "  Recent Labs   Lab 02/24/25  1551 02/25/25  0426   WBC 7.03 11.19   HGB 15.5 14.6   HCT 50.7 48.4    196     CMP:   Recent Labs   Lab 02/24/25  1624 02/25/25  0426   * 141   K 4.9 4.2    104   CO2 21* 26   * 54*   BUN 14 14   CREATININE 1.4 1.3   CALCIUM 8.9 8.8   PROT 7.6 7.3   ALBUMIN 3.5 3.4*   BILITOT 1.1* 1.1*   ALKPHOS 435* 388*   AST 62* 58*   ALT 66* 60*   ANIONGAP 11 11     Cardiac Markers:   Recent Labs   Lab 02/24/25  1551   *     Coagulation:   Recent Labs   Lab 02/24/25  2220   INR 1.3*   APTT 26.6     Lactic Acid:   Recent Labs   Lab 02/24/25  1818 02/24/25  2220   LACTATE 2.6* 1.2     Lipase:   Recent Labs   Lab 02/24/25  1624   LIPASE 26     Lipid Panel: No results for input(s): "CHOL", "HDL", "LDLCALC", "TRIG", "CHOLHDL" in the last 48 hours.  Magnesium: No results for input(s): "MG" in the last 48 hours.  POCT Glucose:   Recent Labs   Lab 02/25/25  0557 02/25/25  0623 02/25/25  0744   POCTGLUCOSE 59* 99 114*     Prealbumin: No results for input(s): "PREALBUMIN" in the last 48 hours.  Respiratory Culture: No results for input(s): "GSRESP", "RESPIRATORYC" in the last 48 hours.  Troponin:   Recent Labs   Lab 02/24/25  2220 02/25/25  0426 02/25/25  1020   TROPONINI 0.038* 0.045* 0.039*     TSH:   Recent Labs   Lab 09/17/24  1012   TSH 2.897     Urine Culture: No results for input(s): "LABURIN" in the last 48 hours.  Urine Studies:   Recent Labs   Lab 02/24/25  1810   COLORU Yellow   APPEARANCEUA Clear   PHUR 6.0   SPECGRAV 1.010   PROTEINUA 2+*   GLUCUA 3+*   KETONESU Negative   BILIRUBINUA Negative   OCCULTUA Trace*   NITRITE Negative   UROBILINOGEN 1.0   LEUKOCYTESUR Negative   RBCUA 0   WBCUA 3   BACTERIA Occasional   HYALINECASTS 0       Significant Imaging: I have reviewed all pertinent imaging results/findings within the past 24 hours.  Assessment/Plan:     Abdominal pain  Received GI cocktail in the ED    Abdominal U/s: There is no sonographic evidence for " cholelithiasis, the gallbladder is partially contracted, gallbladder wall thickening may relate to chronic change as well as may relate to contraction, as discussed above, there is no additional sonographic evidence for acute cholecystitis.     Abdominal pain is resolved.  Pt will f/u with PCP for further work up     Tobacco use disorder  Smoking cessation resources when patient is interested       Acute pulmonary edema  Seen on CXR         Elevated brain natriuretic peptide (BNP) level  Due to HF       Diabetes mellitus type 2 in nonobese  Patient's FSGs are uncontrolled due to hyperglycemia on current medication regimen.  Last A1c reviewed-   Lab Results   Component Value Date    HGBA1C 10.3 (H) 01/07/2025     Most recent fingerstick glucose reviewed-   Recent Labs   Lab 02/25/25  0557 02/25/25  0623 02/25/25  0744   POCTGLUCOSE 59* 99 114*     Current correctional scale  Low  Maintain anti-hyperglycemic dose as follows-   Antihyperglycemics (From admission, onward)      Start     Stop Route Frequency Ordered    02/25/25 0900  empagliflozin (Jardiance) tablet 10 mg        Question Answer Comment   Does this patient have a diagnosis of heart failure? Yes    Does this patient have type 1 diabetes or diabetic ketoacidosis? No    Does this patient have symptomatic hypotension? No    Is the patient NPO or pending major surgery in next 3 days or less? No        -- Oral Daily 02/25/25 0723    02/25/25 0715  insulin aspart U-100 pen 8 Units         -- SubQ 3 times daily with meals 02/25/25 0015    02/25/25 0030  insulin glargine U-100 (Lantus) pen 15 Units         -- SubQ Nightly 02/25/25 0015    02/25/25 0015  insulin aspart U-100 pen 0-5 Units         -- SubQ Before meals & nightly PRN 02/25/25 0015          Hold Oral hypoglycemics while patient is in the hospital.    Acute exacerbation of CHF (congestive heart failure)  Patient has Systolic (HFrEF) heart failure that is Acute on chronic. On presentation their CHF was  decompensated. Evidence of decompensated CHF on presentation includes: shortness of breath. The etiology of their decompensation is likely medication non-compliance. Most recent BNP and echo results are listed below.  Recent Labs     02/24/25  1551   *     Latest ECHO  No results found for this or any previous visit.    Current Heart Failure Medications  carvediloL tablet 6.25 mg, 2 times daily, Oral  furosemide injection 40 mg, Every 12 hours, Intravenous  spironolactone tablet 25 mg, Daily, Oral  empagliflozin (Jardiance) tablet 10 mg, Daily, Oral    Plan  - Monitor strict I&Os and daily weights.    - Place on telemetry  - Low sodium diet  - Place on fluid restriction of 1.5 L.   - Cardiology has been consulted  - The patient's volume status is at their baseline    Net negative 1.6 L since admission   Echo pending and possible discharge today      V-tach  Amiodarone     YOLY (obstructive sleep apnea)  Recommend CPAP machine       Coronary artery disease involving native coronary artery of native heart with angina pectoris  Patient with known CAD  Continue plavix, eliquis and atorvastatin     Peripheral vascular disease, unspecified  CTAP with Aortobifemoral bypass graft, with interval progressive aneurysmal dilation of the proximal aspect of the graft, and with worsening stenosis of the right iliac portion of the graft.     Continue eliquis, plavix and statin   Discussed with Dr. Self and no acute concerns.  F/u outpatient cardiology.        VTE Risk Mitigation (From admission, onward)           Ordered     apixaban tablet 5 mg  2 times daily         02/25/25 0015     IP VTE HIGH RISK PATIENT  Once         02/25/25 0015     Place sequential compression device  Until discontinued         02/25/25 0015                         On 02/25/2025, patient should be placed in hospital observation services under my care in collaboration with Dr. Agarwal.           Falguni Grimes PA-C  Department of [facility]  Medicine  [place] [facility] Surg (3rd Fl)

## 2025-02-25 NOTE — PLAN OF CARE
Problem: Adult Inpatient Plan of Care  Goal: Plan of Care Review  Outcome: Progressing  Goal: Patient-Specific Goal (Individualized)  Outcome: Progressing  Goal: Absence of Hospital-Acquired Illness or Injury  Outcome: Progressing  Goal: Optimal Comfort and Wellbeing  Outcome: Progressing  Goal: Readiness for Transition of Care  Outcome: Progressing     Problem: Diabetes Comorbidity  Goal: Blood Glucose Level Within Targeted Range  Outcome: Progressing     Pt from ED c/o being exhausted but feeling better. V/S stable. Stands to void.  No SOB noted, oxygen in place to maintain sats.

## 2025-02-26 NOTE — SUBJECTIVE & OBJECTIVE
Past Medical History:   Diagnosis Date    Angina pectoris     Anticoagulant long-term use     plavix and  pletal    Anxiety and depression     Bipolar disorder     Patient denies    CHF (congestive heart failure)     Chronic bronchitis     Coronary artery disease     Diabetes mellitus     Diabetes with neurologic complications     Dyslipidemia     Encounter for blood transfusion     GERD (gastroesophageal reflux disease)     History of claustrophobia     IF SOMEONE TOUCHES HIS NOSE    Hyperlipidemia     Hypertension     Irregular heartbeat     Ischemic cardiomyopathy     MI (myocardial infarction) 2009    multiple MI's    Multiple gastric ulcers     Pacemaker     PAD (peripheral artery disease)     Pneumonia     Polyneuropathy     Presence of automatic implantable cardioverter-defibrillator     PVD (peripheral vascular disease)     Respiratory failure     Sleep apnea     does not have machine     Tobacco dependence     Trouble in sleeping     Type 2 diabetes with peripheral circulatory disorder, controlled        Past Surgical History:   Procedure Laterality Date    ABLATION N/A 8/2/2023    Procedure: Ablation;  Surgeon: Ge Benedict MD;  Location: [facility] CATH;  Service: Cardiology;  Laterality: N/A;  *ICD*    ANGIOPLASTY Bilateral     right  and left iliac stents    AORTA - BILATERAL FEMORAL ARTERY BYPASS GRAFT  07/09/2013    APPENDECTOMY      ARTERIAL THROMBECTOMY Right 02/16/2012    iliac artery    ARTERIAL THROMBECTOMY Left 02/23/2012    brachial artery    CARDIAC DEFIBRILLATOR PLACEMENT Right 10/23/2012    [employer]    CORONARY ANGIOPLASTY WITH STENT PLACEMENT      CORONARY ARTERY BYPASS GRAFT  2011    x3    CREATION OF FEMORAL-FEMORAL ARTERY BYPASS WITH GRAFT N/A 3/27/2019    Procedure: CREATION, BYPASS, ARTERIAL, FEMORAL TO FEMORAL, USING GRAFT - RIGHT TO LEFT;  Surgeon: Efren Fung MD;  Location: [facility] OR;  Service: Cardiothoracic;  Laterality: N/A;    ENDARTERECTOMY OF FEMORAL ARTERY Right  3/27/2019    Procedure: ENDARTERECTOMY, FEMORAL;  Surgeon: Efren Fung MD;  Location: [facility] OR;  Service: Cardiothoracic;  Laterality: Right;    EXPLORATION OF FEMORAL ARTERY Left 6/27/2019    Procedure: EXPLORATION, ARTERY, FEMORAL with repair;  Surgeon: Naresh Randolph MD;  Location: [facility] OR;  Service: Cardiovascular;  Laterality: Left;    FINGER SURGERY      amputation right middle finger    LEFT HEART CATHETERIZATION N/A 5/17/2023    Procedure: Left heart cath;  Surgeon: Ezequiel Peña MD;  Location: [facility] CATH;  Service: Cardiology;  Laterality: N/A;    LEFT HEART CATHETERIZATION Left 2/19/2024    Procedure: Left heart cath;  Surgeon: Ezequiel Peña MD;  Location: [facility] CATH;  Service: Cardiology;  Laterality: Left;    PERCUTANEOUS TRANSLUMINAL ANGIOPLASTY (PTA) OF PERIPHERAL VESSEL N/A 1/9/2019    Procedure: PTA, PERIPHERAL VESSEL;  Surgeon: Alex Pittman MD;  Location: [facility] CATH;  Service: Cardiology;  Laterality: N/A;  administer lytic therapy overnight on Wednesday with then plans to touch up other vessels Thursday morning January 10,2019    PERCUTANEOUS TRANSLUMINAL ANGIOPLASTY (PTA) OF PERIPHERAL VESSEL N/A 6/27/2019    Procedure: PTA, PERIPHERAL VESSEL;  Surgeon: Alex Pittman MD;  Location: [facility] CATH;  Service: Cardiology;  Laterality: N/A;    PERCUTANEOUS TRANSLUMINAL ANGIOPLASTY (PTA) OF PERIPHERAL VESSEL N/A 1/30/2020    Procedure: PTA, PERIPHERAL VESSEL;  Surgeon: Alex Pittman MD;  Location: [facility] CATH;  Service: Cardiology;  Laterality: N/A;    REPLACEMENT OF IMPLANTABLE CARDIOVERTER-DEFIBRILLATOR (ICD) GENERATOR N/A 12/15/2021    Procedure: REPLACEMENT, ICD GENERATOR with possible upgrade to CRT;  Surgeon: Houston Pineda MD;  Location: [facility] CATH;  Service: Cardiology;  Laterality: N/A;    WOUND EXPLORATION Left 6/27/2019    Procedure: EXPLORATION, WOUND  Foreign body left femoral;  Surgeon: Naresh Randolph MD;  Location: [facility] OR;  Service: Cardiovascular;  Laterality: Left;  "      Review of patient's allergies indicates:   Allergen Reactions    Fish oil Swelling    Gabapentin Swelling       No current facility-administered medications on file prior to encounter.     Current Outpatient Medications on File Prior to Encounter   Medication Sig    ALPRAZolam (XANAX) 0.25 MG tablet Take 0.25 mg by mouth daily as needed.    amiodarone (PACERONE) 200 MG Tab Take 200 mg by mouth once daily.    blood-glucose meter,continuous (DEXCOM G7 ) Misc Check blood sugar continuously    blood-glucose sensor (DEXCOM G7 SENSOR) Abril Check blood sugar continuous    carvediloL (COREG) 6.25 MG tablet Take 6.25 mg by mouth 2 (two) times daily.    clopidogrel (PLAVIX) 75 mg tablet Take 75 mg by mouth once daily.    ELIQUIS 5 mg Tab Take 1 tablet (5 mg total) by mouth 2 (two) times daily.    fenofibrate micronized (LOFIBRA) 134 MG Cap Take 134 mg by mouth once daily.    insulin (LANTUS SOLOSTAR U-100 INSULIN) glargine 100 units/mL SubQ pen Inject 40 Units into the skin 2 (two) times a day.    insulin syringe-needle U-100 1 mL 29 gauge x 1/2" Syrg BID    pantoprazole (PROTONIX) 40 MG tablet Take 40 mg by mouth once daily.    pen needle, diabetic (BD ULTRA-FINE SHORT PEN NEEDLE) 31 gauge x 5/16" Ndle USE AS DIRECTED TWICE DAILY    potassium chloride (K-TAB) 20 mEq Take 20 mEq by mouth Daily.    rosuvastatin (CRESTOR) 40 MG Tab Take 40 mg by mouth once daily.    sacubitriL-valsartan (ENTRESTO) 24-26 mg per tablet Entresto 24 mg-26 mg tablet, [RxNorm: 9921297]    tadalafiL (CIALIS) 20 MG Tab Take 20 mg by mouth once daily.    tirzepatide (MOUNJARO) 5 mg/0.5 mL PnIj Inject 5 mg into the skin every 7 days.    torsemide (DEMADEX) 20 MG Tab Take 10 mg by mouth daily as needed.     Family History       Problem Relation (Age of Onset)    Cancer Mother    Diabetes Daughter    Heart attacks under age 50 Brother, Paternal Grandfather    Heart disease Father    Hyperlipidemia Father    Hypertension Father      "     Tobacco Use    Smoking status: Every Day     Current packs/day: 1.00     Average packs/day: 1 pack/day for 47.2 years (47.2 ttl pk-yrs)     Types: Cigarettes     Start date: 1978     Passive exposure: Current    Smokeless tobacco: Never   Substance and Sexual Activity    Alcohol use: Not Currently    Drug use: No    Sexual activity: Yes     Review of Systems   Constitutional:  Negative for chills and fever.   HENT:  Negative for ear discharge and ear pain.    Eyes:  Negative for pain and discharge.   Respiratory:  Positive for shortness of breath (back to baseline). Negative for cough.    Cardiovascular:  Negative for chest pain and leg swelling.   Gastrointestinal:  Negative for nausea and vomiting.   Endocrine: Negative for cold intolerance and heat intolerance.   Genitourinary:  Negative for difficulty urinating and dysuria.   Musculoskeletal:  Negative for joint swelling and myalgias.   Skin:  Negative for rash and wound.   Neurological:  Negative for dizziness and headaches.   Psychiatric/Behavioral:  Negative for agitation and confusion.      Objective:     Vital Signs (Most Recent):  Temp: 98.2 °F (36.8 °C) (02/25/25 1543)  Pulse: 60 (02/25/25 1543)  Resp: 20 (02/25/25 1543)  BP: 103/61 (02/25/25 1543)  SpO2: 97 % (02/25/25 1543) Vital Signs (24h Range):  Temp:  [98.2 °F (36.8 °C)] 98.2 °F (36.8 °C)  Pulse:  [60] 60  Resp:  [20] 20  SpO2:  [97 %] 97 %  BP: (103)/(61) 103/61     Weight: 86.6 kg (190 lb 14.7 oz)  Body mass index is 30.81 kg/m².     Physical Exam  Constitutional:       General: He is not in acute distress.  HENT:      Head: Normocephalic and atraumatic.   Eyes:      General:         Right eye: No discharge.         Left eye: No discharge.   Cardiovascular:      Rate and Rhythm: Normal rate and regular rhythm.   Pulmonary:      Effort: Pulmonary effort is normal.      Breath sounds: Normal breath sounds.   Abdominal:      General: There is no distension.      Tenderness: There is no abdominal  "tenderness.   Musculoskeletal:         General: No swelling or tenderness.      Cervical back: Neck supple. No tenderness.      Right lower leg: No edema.      Left lower leg: No edema.   Skin:     General: Skin is warm and dry.   Neurological:      General: No focal deficit present.      Mental Status: He is alert and oriented to person, place, and time.   Psychiatric:         Mood and Affect: Mood normal.         Behavior: Behavior normal.                Significant Labs: A1C:   Recent Labs   Lab 09/17/24  1012 01/07/25  1035   HGBA1C 9.2* 10.3*     ABGs:   Recent Labs   Lab 02/24/25  1826   PH 7.410   PCO2 30*   HCO3 19.00*   POCSATURATED 96.0   BE -4.30*   TOTALHB 16.0   COHB 2.3   METHB 0.8   PO2 84     Bilirubin:   Recent Labs   Lab 02/24/25  1624 02/25/25  0426   BILITOT 1.1* 1.1*     Blood Culture: No results for input(s): "LABBLOO" in the last 48 hours.  BMP:   Recent Labs   Lab 02/25/25  0426   GLU 54*      K 4.2      CO2 26   BUN 14   CREATININE 1.3   CALCIUM 8.8     CBC:   Recent Labs   Lab 02/24/25  1551 02/25/25  0426   WBC 7.03 11.19   HGB 15.5 14.6   HCT 50.7 48.4    196     CMP:   Recent Labs   Lab 02/24/25  1624 02/25/25  0426   * 141   K 4.9 4.2    104   CO2 21* 26   * 54*   BUN 14 14   CREATININE 1.4 1.3   CALCIUM 8.9 8.8   PROT 7.6 7.3   ALBUMIN 3.5 3.4*   BILITOT 1.1* 1.1*   ALKPHOS 435* 388*   AST 62* 58*   ALT 66* 60*   ANIONGAP 11 11     Cardiac Markers:   Recent Labs   Lab 02/24/25  1551   *     Coagulation:   Recent Labs   Lab 02/24/25  2220   INR 1.3*   APTT 26.6     Lactic Acid:   Recent Labs   Lab 02/24/25  1818 02/24/25  2220   LACTATE 2.6* 1.2     Lipase:   Recent Labs   Lab 02/24/25  1624   LIPASE 26     Lipid Panel: No results for input(s): "CHOL", "HDL", "LDLCALC", "TRIG", "CHOLHDL" in the last 48 hours.  Magnesium: No results for input(s): "MG" in the last 48 hours.  POCT Glucose:   Recent Labs   Lab 02/25/25  0744 02/25/25  1131 " "02/25/25  1315   POCTGLUCOSE 114* 65* 227*     Prealbumin: No results for input(s): "PREALBUMIN" in the last 48 hours.  Respiratory Culture: No results for input(s): "GSRESP", "RESPIRATORYC" in the last 48 hours.  Troponin:   Recent Labs   Lab 02/24/25  2220 02/25/25  0426 02/25/25  1020   TROPONINI 0.038* 0.045* 0.039*     TSH:   Recent Labs   Lab 09/17/24  1012   TSH 2.897     Urine Culture: No results for input(s): "LABURIN" in the last 48 hours.  Urine Studies:   Recent Labs   Lab 02/24/25  1810   COLORU Yellow   APPEARANCEUA Clear   PHUR 6.0   SPECGRAV 1.010   PROTEINUA 2+*   GLUCUA 3+*   KETONESU Negative   BILIRUBINUA Negative   OCCULTUA Trace*   NITRITE Negative   UROBILINOGEN 1.0   LEUKOCYTESUR Negative   RBCUA 0   WBCUA 3   BACTERIA Occasional   HYALINECASTS 0       Significant Imaging: I have reviewed all pertinent imaging results/findings within the past 24 hours.  "

## 2025-02-26 NOTE — ASSESSMENT & PLAN NOTE
"Patient's FSGs are uncontrolled due to hyperglycemia on current medication regimen.  Last A1c reviewed-   Lab Results   Component Value Date    HGBA1C 10.3 (H) 01/07/2025     Most recent fingerstick glucose reviewed-   No results for input(s): "POCTGLUCOSE" in the last 24 hours.    Current correctional scale  Low  Maintain anti-hyperglycemic dose as follows-   Antihyperglycemics (From admission, onward)    None        Hold Oral hypoglycemics while patient is in the hospital.  "

## 2025-02-26 NOTE — ASSESSMENT & PLAN NOTE
Patient has Systolic (HFrEF) heart failure that is Acute on chronic. On presentation their CHF was decompensated. Evidence of decompensated CHF on presentation includes: shortness of breath. The etiology of their decompensation is likely medication non-compliance. Most recent BNP and echo results are listed below.  Recent Labs     02/24/25  1551   *       Latest ECHO  No results found for this or any previous visit.    Current Heart Failure Medications  empagliflozin (Jardiance) tablet, Daily, Oral  spironolactone (ALDACTONE) tablet, Daily, Oral    Plan  - Monitor strict I&Os and daily weights.    - Place on telemetry  - Low sodium diet  - Place on fluid restriction of 1.5 L.   - Cardiology has been consulted  - The patient's volume status is at their baseline      Echo Severe global hypokinesis present. There is severely reduced systolic function with a visually estimated ejection fraction of 10 -15%. Grade III diastolic dysfunction. This is known to cardiology.  PM.  F/u outpatient cardiology

## 2025-02-26 NOTE — PROGRESS NOTES
[place] [facility] Surg ([facility])  [facility] Medicine  Progress Note    Patient Name: Miles Currie  MRN: [id number]  Patient Class: OP- Observation   Admission Date: 2/24/2025  Length of Stay: 0 days  Attending Physician: No att. providers found  Primary Care Provider: Adela Gifford MD        Subjective     Principal Problem:Acute exacerbation of CHF (congestive heart failure)        HPI:  Patient is a 59 year old male with medical history of YOLY, tobacco use disorder, anxiety, depression, HTN, HLD, DM type 2, CHF (Ef 10-15% on echo 2023), CAD, CABG, pacemaker,  and PVD with aortobifemoral bypass who presented to the ED with worsening SOB.  Symptoms started two week ago.  He did not noticed any chest pain, fever, cough and congestion.  No recent medication changes or changes in diet.  However he is unable to get his jardiance that is prescribed.  Patient's symptoms are resolved.      Admitted for SOB secondary to acute on chronic heart failure exacerbation.                Overview/Hospital Course:  Patient admitted for HF exacerbation.  Diuresed 2 L and back to his baseline.  Echo with Severe global hypokinesis present. There is severely reduced systolic function with a visually estimated ejection fraction of 10 -15%. Grade III diastolic dysfunction.  This is known to cardiology.  Continue home GDMT     Past Medical History:   Diagnosis Date    Angina pectoris     Anticoagulant long-term use     plavix and  pletal    Anxiety and depression     Bipolar disorder     Patient denies    CHF (congestive heart failure)     Chronic bronchitis     Coronary artery disease     Diabetes mellitus     Diabetes with neurologic complications     Dyslipidemia     Encounter for blood transfusion     GERD (gastroesophageal reflux disease)     History of claustrophobia     IF SOMEONE TOUCHES HIS NOSE    Hyperlipidemia     Hypertension     Irregular heartbeat     Ischemic cardiomyopathy     MI (myocardial infarction) 2009    multiple MI's     Multiple gastric ulcers     Pacemaker     PAD (peripheral artery disease)     Pneumonia     Polyneuropathy     Presence of automatic implantable cardioverter-defibrillator     PVD (peripheral vascular disease)     Respiratory failure     Sleep apnea     does not have machine     Tobacco dependence     Trouble in sleeping     Type 2 diabetes with peripheral circulatory disorder, controlled        Past Surgical History:   Procedure Laterality Date    ABLATION N/A 8/2/2023    Procedure: Ablation;  Surgeon: Ge Benedict MD;  Location: [facility] CATH;  Service: Cardiology;  Laterality: N/A;  *ICD*    ANGIOPLASTY Bilateral     right  and left iliac stents    AORTA - BILATERAL FEMORAL ARTERY BYPASS GRAFT  07/09/2013    APPENDECTOMY      ARTERIAL THROMBECTOMY Right 02/16/2012    iliac artery    ARTERIAL THROMBECTOMY Left 02/23/2012    brachial artery    CARDIAC DEFIBRILLATOR PLACEMENT Right 10/23/2012    [employer]    CORONARY ANGIOPLASTY WITH STENT PLACEMENT      CORONARY ARTERY BYPASS GRAFT  2011    x3    CREATION OF FEMORAL-FEMORAL ARTERY BYPASS WITH GRAFT N/A 3/27/2019    Procedure: CREATION, BYPASS, ARTERIAL, FEMORAL TO FEMORAL, USING GRAFT - RIGHT TO LEFT;  Surgeon: Efren Fung MD;  Location: [facility] OR;  Service: Cardiothoracic;  Laterality: N/A;    ENDARTERECTOMY OF FEMORAL ARTERY Right 3/27/2019    Procedure: ENDARTERECTOMY, FEMORAL;  Surgeon: Efren Fung MD;  Location: [facility] OR;  Service: Cardiothoracic;  Laterality: Right;    EXPLORATION OF FEMORAL ARTERY Left 6/27/2019    Procedure: EXPLORATION, ARTERY, FEMORAL with repair;  Surgeon: Naresh Randolph MD;  Location: [facility] OR;  Service: Cardiovascular;  Laterality: Left;    FINGER SURGERY      amputation right middle finger    LEFT HEART CATHETERIZATION N/A 5/17/2023    Procedure: Left heart cath;  Surgeon: Ezequiel Peña MD;  Location: [facility] CATH;  Service: Cardiology;  Laterality: N/A;    LEFT HEART CATHETERIZATION Left 2/19/2024    Procedure: Left  heart cath;  Surgeon: Ezequiel Peña MD;  Location: [facility] CATH;  Service: Cardiology;  Laterality: Left;    PERCUTANEOUS TRANSLUMINAL ANGIOPLASTY (PTA) OF PERIPHERAL VESSEL N/A 1/9/2019    Procedure: PTA, PERIPHERAL VESSEL;  Surgeon: Alex Pittman MD;  Location: [facility] CATH;  Service: Cardiology;  Laterality: N/A;  administer lytic therapy overnight on Wednesday with then plans to touch up other vessels Thursday morning January 10,2019    PERCUTANEOUS TRANSLUMINAL ANGIOPLASTY (PTA) OF PERIPHERAL VESSEL N/A 6/27/2019    Procedure: PTA, PERIPHERAL VESSEL;  Surgeon: Alex Pittman MD;  Location: [facility] CATH;  Service: Cardiology;  Laterality: N/A;    PERCUTANEOUS TRANSLUMINAL ANGIOPLASTY (PTA) OF PERIPHERAL VESSEL N/A 1/30/2020    Procedure: PTA, PERIPHERAL VESSEL;  Surgeon: Alex Pittman MD;  Location: [facility] CATH;  Service: Cardiology;  Laterality: N/A;    REPLACEMENT OF IMPLANTABLE CARDIOVERTER-DEFIBRILLATOR (ICD) GENERATOR N/A 12/15/2021    Procedure: REPLACEMENT, ICD GENERATOR with possible upgrade to CRT;  Surgeon: Houston Pineda MD;  Location: [facility] CATH;  Service: Cardiology;  Laterality: N/A;    WOUND EXPLORATION Left 6/27/2019    Procedure: EXPLORATION, WOUND  Foreign body left femoral;  Surgeon: Naresh Randolph MD;  Location: [facility] OR;  Service: Cardiovascular;  Laterality: Left;       Review of patient's allergies indicates:   Allergen Reactions    Fish oil Swelling    Gabapentin Swelling       No current facility-administered medications on file prior to encounter.     Current Outpatient Medications on File Prior to Encounter   Medication Sig    ALPRAZolam (XANAX) 0.25 MG tablet Take 0.25 mg by mouth daily as needed.    amiodarone (PACERONE) 200 MG Tab Take 200 mg by mouth once daily.    blood-glucose meter,continuous (DEXCOM G7 ) Misc Check blood sugar continuously    blood-glucose sensor (DEXCOM G7 SENSOR) Abril Check blood sugar continuous    carvediloL (COREG) 6.25 MG tablet Take 6.25 mg by mouth  "2 (two) times daily.    clopidogrel (PLAVIX) 75 mg tablet Take 75 mg by mouth once daily.    ELIQUIS 5 mg Tab Take 1 tablet (5 mg total) by mouth 2 (two) times daily.    fenofibrate micronized (LOFIBRA) 134 MG Cap Take 134 mg by mouth once daily.    insulin (LANTUS SOLOSTAR U-100 INSULIN) glargine 100 units/mL SubQ pen Inject 40 Units into the skin 2 (two) times a day.    insulin syringe-needle U-100 1 mL 29 gauge x 1/2" Syrg BID    pantoprazole (PROTONIX) 40 MG tablet Take 40 mg by mouth once daily.    pen needle, diabetic (BD ULTRA-FINE SHORT PEN NEEDLE) 31 gauge x 5/16" Ndle USE AS DIRECTED TWICE DAILY    potassium chloride (K-TAB) 20 mEq Take 20 mEq by mouth Daily.    rosuvastatin (CRESTOR) 40 MG Tab Take 40 mg by mouth once daily.    sacubitriL-valsartan (ENTRESTO) 24-26 mg per tablet Entresto 24 mg-26 mg tablet, [RxNorm: 3527610]    tadalafiL (CIALIS) 20 MG Tab Take 20 mg by mouth once daily.    tirzepatide (MOUNJARO) 5 mg/0.5 mL PnIj Inject 5 mg into the skin every 7 days.    torsemide (DEMADEX) 20 MG Tab Take 10 mg by mouth daily as needed.     Family History       Problem Relation (Age of Onset)    Cancer Mother    Diabetes Daughter    Heart attacks under age 50 Brother, Paternal Grandfather    Heart disease Father    Hyperlipidemia Father    Hypertension Father          Tobacco Use    Smoking status: Every Day     Current packs/day: 1.00     Average packs/day: 1 pack/day for 47.2 years (47.2 ttl pk-yrs)     Types: Cigarettes     Start date: 1978     Passive exposure: Current    Smokeless tobacco: Never   Substance and Sexual Activity    Alcohol use: Not Currently    Drug use: No    Sexual activity: Yes     Review of Systems   Constitutional:  Negative for chills and fever.   HENT:  Negative for ear discharge and ear pain.    Eyes:  Negative for pain and discharge.   Respiratory:  Positive for shortness of breath (back to baseline). Negative for cough.    Cardiovascular:  Negative for chest pain and leg " swelling.   Gastrointestinal:  Negative for nausea and vomiting.   Endocrine: Negative for cold intolerance and heat intolerance.   Genitourinary:  Negative for difficulty urinating and dysuria.   Musculoskeletal:  Negative for joint swelling and myalgias.   Skin:  Negative for rash and wound.   Neurological:  Negative for dizziness and headaches.   Psychiatric/Behavioral:  Negative for agitation and confusion.      Objective:     Vital Signs (Most Recent):  Temp: 98.2 °F (36.8 °C) (02/25/25 1543)  Pulse: 60 (02/25/25 1543)  Resp: 20 (02/25/25 1543)  BP: 103/61 (02/25/25 1543)  SpO2: 97 % (02/25/25 1543) Vital Signs (24h Range):  Temp:  [98.2 °F (36.8 °C)] 98.2 °F (36.8 °C)  Pulse:  [60] 60  Resp:  [20] 20  SpO2:  [97 %] 97 %  BP: (103)/(61) 103/61     Weight: 86.6 kg (190 lb 14.7 oz)  Body mass index is 30.81 kg/m².     Physical Exam  Constitutional:       General: He is not in acute distress.  HENT:      Head: Normocephalic and atraumatic.   Eyes:      General:         Right eye: No discharge.         Left eye: No discharge.   Cardiovascular:      Rate and Rhythm: Normal rate and regular rhythm.   Pulmonary:      Effort: Pulmonary effort is normal.      Breath sounds: Normal breath sounds.   Abdominal:      General: There is no distension.      Tenderness: There is no abdominal tenderness.   Musculoskeletal:         General: No swelling or tenderness.      Cervical back: Neck supple. No tenderness.      Right lower leg: No edema.      Left lower leg: No edema.   Skin:     General: Skin is warm and dry.   Neurological:      General: No focal deficit present.      Mental Status: He is alert and oriented to person, place, and time.   Psychiatric:         Mood and Affect: Mood normal.         Behavior: Behavior normal.                Significant Labs: A1C:   Recent Labs   Lab 09/17/24  1012 01/07/25  1035   HGBA1C 9.2* 10.3*     ABGs:   Recent Labs   Lab 02/24/25  1826   PH 7.410   PCO2 30*   HCO3 19.00*  "  POCSATURATED 96.0   BE -4.30*   TOTALHB 16.0   COHB 2.3   METHB 0.8   PO2 84     Bilirubin:   Recent Labs   Lab 02/24/25  1624 02/25/25  0426   BILITOT 1.1* 1.1*     Blood Culture: No results for input(s): "LABBLOO" in the last 48 hours.  BMP:   Recent Labs   Lab 02/25/25  0426   GLU 54*      K 4.2      CO2 26   BUN 14   CREATININE 1.3   CALCIUM 8.8     CBC:   Recent Labs   Lab 02/24/25  1551 02/25/25  0426   WBC 7.03 11.19   HGB 15.5 14.6   HCT 50.7 48.4    196     CMP:   Recent Labs   Lab 02/24/25  1624 02/25/25  0426   * 141   K 4.9 4.2    104   CO2 21* 26   * 54*   BUN 14 14   CREATININE 1.4 1.3   CALCIUM 8.9 8.8   PROT 7.6 7.3   ALBUMIN 3.5 3.4*   BILITOT 1.1* 1.1*   ALKPHOS 435* 388*   AST 62* 58*   ALT 66* 60*   ANIONGAP 11 11     Cardiac Markers:   Recent Labs   Lab 02/24/25  1551   *     Coagulation:   Recent Labs   Lab 02/24/25  2220   INR 1.3*   APTT 26.6     Lactic Acid:   Recent Labs   Lab 02/24/25  1818 02/24/25  2220   LACTATE 2.6* 1.2     Lipase:   Recent Labs   Lab 02/24/25  1624   LIPASE 26     Lipid Panel: No results for input(s): "CHOL", "HDL", "LDLCALC", "TRIG", "CHOLHDL" in the last 48 hours.  Magnesium: No results for input(s): "MG" in the last 48 hours.  POCT Glucose:   Recent Labs   Lab 02/25/25  0744 02/25/25  1131 02/25/25  1315   POCTGLUCOSE 114* 65* 227*     Prealbumin: No results for input(s): "PREALBUMIN" in the last 48 hours.  Respiratory Culture: No results for input(s): "GSRESP", "RESPIRATORYC" in the last 48 hours.  Troponin:   Recent Labs   Lab 02/24/25  2220 02/25/25  0426 02/25/25  1020   TROPONINI 0.038* 0.045* 0.039*     TSH:   Recent Labs   Lab 09/17/24  1012   TSH 2.897     Urine Culture: No results for input(s): "LABURIN" in the last 48 hours.  Urine Studies:   Recent Labs   Lab 02/24/25  1810   COLORU Yellow   APPEARANCEUA Clear   PHUR 6.0   SPECGRAV 1.010   PROTEINUA 2+*   GLUCUA 3+*   KETONESU Negative   BILIRUBINUA Negative "   OCCULTUA Trace*   NITRITE Negative   UROBILINOGEN 1.0   LEUKOCYTESUR Negative   RBCUA 0   WBCUA 3   BACTERIA Occasional   HYALINECASTS 0       Significant Imaging: I have reviewed all pertinent imaging results/findings within the past 24 hours.    Assessment and Plan     * Acute exacerbation of CHF (congestive heart failure)  Patient has Systolic (HFrEF) heart failure that is Acute on chronic. On presentation their CHF was decompensated. Evidence of decompensated CHF on presentation includes: shortness of breath. The etiology of their decompensation is likely medication non-compliance. Most recent BNP and echo results are listed below.  Recent Labs     02/24/25  1551   *       Latest ECHO  No results found for this or any previous visit.    Current Heart Failure Medications  empagliflozin (Jardiance) tablet, Daily, Oral  spironolactone (ALDACTONE) tablet, Daily, Oral    Plan  - Monitor strict I&Os and daily weights.    - Place on telemetry  - Low sodium diet  - Place on fluid restriction of 1.5 L.   - Cardiology has been consulted  - The patient's volume status is at their baseline      Echo Severe global hypokinesis present. There is severely reduced systolic function with a visually estimated ejection fraction of 10 -15%. Grade III diastolic dysfunction. This is known to cardiology.  PM.  F/u outpatient cardiology       Abdominal pain  Received GI cocktail in the ED    Abdominal U/s: There is no sonographic evidence for cholelithiasis, the gallbladder is partially contracted, gallbladder wall thickening may relate to chronic change as well as may relate to contraction, as discussed above, there is no additional sonographic evidence for acute cholecystitis.     Abdominal pain is resolved.  Pt will f/u with PCP for further work up     Tobacco use disorder  Smoking cessation resources when patient is interested       Acute pulmonary edema  Seen on CXR         Elevated brain natriuretic peptide (BNP)  "level  Due to HF       Diabetes mellitus type 2 in nonobese  Patient's FSGs are uncontrolled due to hyperglycemia on current medication regimen.  Last A1c reviewed-   Lab Results   Component Value Date    HGBA1C 10.3 (H) 01/07/2025     Most recent fingerstick glucose reviewed-   No results for input(s): "POCTGLUCOSE" in the last 24 hours.    Current correctional scale  Low  Maintain anti-hyperglycemic dose as follows-   Antihyperglycemics (From admission, onward)      None          Hold Oral hypoglycemics while patient is in the hospital.    V-tach  Amiodarone     YOLY (obstructive sleep apnea)  Recommend CPAP machine       Coronary artery disease involving native coronary artery of native heart with angina pectoris  Patient with known CAD  Continue plavix, eliquis and atorvastatin     Peripheral vascular disease, unspecified  CTAP with Aortobifemoral bypass graft, with interval progressive aneurysmal dilation of the proximal aspect of the graft, and with worsening stenosis of the right iliac portion of the graft.     Continue eliquis, plavix and statin   Discussed with Dr. Self and no acute concerns.  F/u outpatient cardiology.        VTE Risk Mitigation (From admission, onward)           Ordered     IP VTE HIGH RISK PATIENT  Once         02/25/25 0015                    Discharge Planning   IRISH: 2/25/2025     Code Status: Prior   Medical Readiness for Discharge Date: 2/25/2025  Discharge Plan A: Home                        Falguni Grimes PA-C  Department of Hospital Medicine   [place] [facility] Surg (3rd Fl)    "

## 2025-02-26 NOTE — HOSPITAL COURSE
Patient admitted for HF exacerbation.  Diuresed 2 L and back to his baseline.  Echo with Severe global hypokinesis present. There is severely reduced systolic function with a visually estimated ejection fraction of 10 -15%. Grade III diastolic dysfunction.  This is known to cardiology.  Continue home GDMT

## 2025-03-03 PROBLEM — J44.1 COPD EXACERBATION: Status: ACTIVE | Noted: 2024-03-05

## 2025-03-03 PROBLEM — F41.9 ANXIETY: Status: ACTIVE | Noted: 2025-03-03

## 2025-03-03 PROBLEM — G62.9 NEUROPATHY: Status: ACTIVE | Noted: 2025-03-03

## 2025-03-03 PROBLEM — I50.23 ACUTE ON CHRONIC SYSTOLIC (CONGESTIVE) HEART FAILURE: Status: ACTIVE | Noted: 2025-03-03

## 2025-03-04 PROBLEM — E11.9 DIABETES MELLITUS TYPE 2 IN NONOBESE: Chronic | Status: ACTIVE | Noted: 2025-02-25

## 2025-03-11 ENCOUNTER — PATIENT OUTREACH (OUTPATIENT)
Dept: INTERNAL MEDICINE | Facility: CLINIC | Age: 60
End: 2025-03-11

## 2025-03-11 ENCOUNTER — OFFICE VISIT (OUTPATIENT)
Dept: INTERNAL MEDICINE | Facility: CLINIC | Age: 60
End: 2025-03-11
Payer: MEDICARE

## 2025-03-11 ENCOUNTER — RESULTS FOLLOW-UP (OUTPATIENT)
Dept: INTERNAL MEDICINE | Facility: CLINIC | Age: 60
End: 2025-03-11

## 2025-03-11 ENCOUNTER — CLINICAL SUPPORT (OUTPATIENT)
Dept: INTERNAL MEDICINE | Facility: CLINIC | Age: 60
End: 2025-03-11
Attending: INTERNAL MEDICINE
Payer: MEDICARE

## 2025-03-11 ENCOUNTER — PATIENT OUTREACH (OUTPATIENT)
Dept: INTERNAL MEDICINE | Facility: CLINIC | Age: 60
End: 2025-03-11
Payer: MEDICARE

## 2025-03-11 VITALS
RESPIRATION RATE: 16 BRPM | HEIGHT: 66 IN | SYSTOLIC BLOOD PRESSURE: 114 MMHG | DIASTOLIC BLOOD PRESSURE: 76 MMHG | OXYGEN SATURATION: 87 % | WEIGHT: 174.63 LBS | HEART RATE: 94 BPM | BODY MASS INDEX: 28.06 KG/M2

## 2025-03-11 DIAGNOSIS — F19.10 POLYSUBSTANCE ABUSE: ICD-10-CM

## 2025-03-11 DIAGNOSIS — E11.65 UNCONTROLLED TYPE 2 DIABETES MELLITUS WITH HYPERGLYCEMIA: ICD-10-CM

## 2025-03-11 DIAGNOSIS — Z13.5 ENCOUNTER FOR SCREENING FOR DIABETIC RETINOPATHY: ICD-10-CM

## 2025-03-11 DIAGNOSIS — Z09 HOSPITAL DISCHARGE FOLLOW-UP: Primary | ICD-10-CM

## 2025-03-11 DIAGNOSIS — I50.23 ACUTE ON CHRONIC SYSTOLIC (CONGESTIVE) HEART FAILURE: ICD-10-CM

## 2025-03-11 DIAGNOSIS — F17.200 TOBACCO USE DISORDER: ICD-10-CM

## 2025-03-11 DIAGNOSIS — Z12.11 COLON CANCER SCREENING: ICD-10-CM

## 2025-03-11 PROBLEM — J44.1 COPD EXACERBATION: Status: RESOLVED | Noted: 2024-03-05 | Resolved: 2025-03-11

## 2025-03-11 PROCEDURE — 2025F 7 FLD RTA PHOTO W/O RTNOPTHY: CPT | Mod: CPTII,S$GLB,, | Performed by: OPTOMETRIST

## 2025-03-11 PROCEDURE — 92228 IMG RTA DETC/MNTR DS PHY/QHP: CPT | Mod: 26,S$GLB,, | Performed by: OPTOMETRIST

## 2025-03-11 PROCEDURE — 99999 PR PBB SHADOW E&M-EST. PATIENT-LVL V: CPT | Mod: PBBFAC,,, | Performed by: INTERNAL MEDICINE

## 2025-03-11 NOTE — PROGRESS NOTES
"Subjective:       Patient ID: Miles Currie is a 59 y.o. male.    Chief Complaint: Follow-up      HPI:  Patient is known to me and presents for hospital follow up. He was admitted 2/24/25 at [facility] then 3/3/25 at [facility] for CHF exacerbation. D/c summary March 2025 reviewed and noted below: "  59-year-old male with history of ischemic cardiomyopathy EF 10 to 15%, previous CABG, CRT-D in place with previous ICD discharges with VT with prior VT ablation, anxiety, recurrent HFrEF NYHA class III, recently admitted to [facility] with CHF exacerbation.  Presents today for follow-up reports he is very weak with worsening dyspnea on exertion unable to do minimal activity, unable to walk 20 feet without stopping with worsening orthopnea and PND over the last few days.  Also had some fever over the weekend.  Productive cough noted as well.     Hospital Course:   3/3: Direct admitted to hospital for acute on chronic combined heart failure  Lasix 40mg IVP q12   NTproBNP 2470, Lactic Acid negative, CXR unremarkable, Covid/Flu/RSV negative  Consult hospital medicine service for medical management diabetes, anxiety, recent febrile illness, COPD  3/4 SOB and on 2 L O2  - Plans to transition to PO lasix tomorrow   3/5 Torsemide 20mg PO BID    Plan:   Pt admitted from clinic for acute on chronic systolic heart failure  Net negative -2.9 L with IV lasix   Transition to PO torsemide today. 20mg PO BID  Still with some shortness of breath. On 1 L NC   CXR improving.   Continue Plavix, Eliquis, BB, Entresto, Jardiance, aldactone, mexiletine, and amio   Low Na+ diet, 1200mL fluid restriction  Strict I&Os and daily weights   Note history of amphetamine use.   Follow up in a week with BMP and NT pro bnp prior to follow up visit. "      He is following with Dr. Pineda outpatient but had to reschedule his appt so no cards f/u since discharge. Unsure that current med list is accurate. Med list and d/c summary note torsemide BID but he " states he has demedex at home. Will contact [facility] to see what was dispensed and update med list as needed     He reports glucose is improving since discharge. He is on GLP-1 was filled but not sure what dose. Thinks he is on trulicity. But not sure what dose. We may consider Ozempic given CAD indication.    Today he denies SOB. Sat 87% but finger sats are unreliable in this patient with PVD. He does not appear in any distress, speaking in full sentences. Looks much better than last visit so I don't think he is truly hypoxic.       Past Medical History:   Diagnosis Date    Angina pectoris     Anticoagulant long-term use     plavix and  pletal    Anxiety and depression     Bipolar disorder     Patient denies    CHF (congestive heart failure)     Chronic bronchitis     Coronary artery disease     Diabetes mellitus     Diabetes with neurologic complications     Dyslipidemia     Encounter for blood transfusion     GERD (gastroesophageal reflux disease)     History of claustrophobia     IF SOMEONE TOUCHES HIS NOSE    Hyperlipidemia     Hypertension     Irregular heartbeat     Ischemic cardiomyopathy     MI (myocardial infarction) 2009    multiple MI's    Multiple gastric ulcers     Pacemaker     PAD (peripheral artery disease)     Pneumonia     Polyneuropathy     Presence of automatic implantable cardioverter-defibrillator     PVD (peripheral vascular disease)     Respiratory failure     Sleep apnea     does not have machine     Tobacco dependence     Trouble in sleeping     Type 2 diabetes with peripheral circulatory disorder, controlled        Family History   Problem Relation Name Age of Onset    Cancer Mother          bone    Heart attacks under age 50 Brother      Diabetes Daughter      Hypertension Father      Heart disease Father      Hyperlipidemia Father      Heart attacks under age 50 Paternal Grandfather         Social History[1]    Review of Systems   Constitutional:  Negative for activity change, fatigue, fever  and unexpected weight change.   HENT:  Negative for congestion, ear pain, hearing loss, rhinorrhea and sore throat.    Eyes:  Negative for redness and visual disturbance.   Respiratory:  Negative for cough, shortness of breath and wheezing.    Cardiovascular:  Negative for chest pain and leg swelling.   Gastrointestinal:  Negative for abdominal pain, constipation, diarrhea, nausea and vomiting.   Musculoskeletal:  Negative for back pain, joint swelling and neck pain.   Skin:  Negative for color change, rash and wound.   Neurological:  Negative for dizziness, light-headedness and headaches.         Objective:      Physical Exam  Vitals reviewed.   Constitutional:       General: He is not in acute distress.     Appearance: He is well-developed.   HENT:      Head: Normocephalic and atraumatic.      Right Ear: External ear normal.      Left Ear: External ear normal.   Eyes:      General:         Right eye: No discharge.         Left eye: No discharge.      Conjunctiva/sclera: Conjunctivae normal.   Neck:      Thyroid: No thyromegaly.   Cardiovascular:      Rate and Rhythm: Normal rate and regular rhythm.   Pulmonary:      Effort: Pulmonary effort is normal. No respiratory distress.      Breath sounds: No rales.   Abdominal:      General: There is no distension.      Palpations: Abdomen is soft.      Tenderness: There is no abdominal tenderness.   Skin:     General: Skin is warm and dry.   Neurological:      Mental Status: He is alert and oriented to person, place, and time.   Psychiatric:         Behavior: Behavior normal.         Thought Content: Thought content normal.         Assessment:       1. Hospital discharge follow-up    2. Colon cancer screening    3. Tobacco use disorder    4. Uncontrolled type 2 diabetes mellitus with hyperglycemia    5. Acute on chronic systolic (congestive) heart failure    6. Polysubstance abuse        Plan:       1. Hospital discharge follow-up  Meds reconciled however unsure med list  is correct. Will contact [facility] to determine what was recently dispensed and update as needed  D/c summary reviewed  Advised to follow up cards as scheduled  No home health services-not needed at this time    2. Colon cancer screening  Dispensed  He is too high risk for colonoscopy at this time despite FH of CRC. We will at least do FIT and if negative continue yearly screening until can tolerate anesthesia from cardiac standpoint  -     Fecal Immunochemical Test (iFOBT); Future; Expected date: 03/11/2025    3. Tobacco use disorder  Declines smoking cessation clinic  Thinks he can quit on his own  Spent > 10 mins on cessation counseling    4. Uncontrolled type 2 diabetes mellitus with hyperglycemia  Chronic uncontrolled but improving  He is unsure what does trulicity he has at home  Will verify with [facility]  May consider ozempic due to CAD indication  Cardiology reduced jardiance to 10mg at discharge    5. Acute on chronic systolic (congestive) heart failure  Resolved  He is euvolemic and no SOB/PIERRE today  Cont meds same dose: entresto, jardiance and diuretics  Follow up cards       RTC as scheduled April with routine labs and PRN and PRN               [1]   Social History  Socioeconomic History    Marital status: Legally    Tobacco Use    Smoking status: Every Day     Current packs/day: 1.00     Average packs/day: 1 pack/day for 47.2 years (47.2 ttl pk-yrs)     Types: Cigarettes     Start date: 1978     Passive exposure: Current    Smokeless tobacco: Never   Substance and Sexual Activity    Alcohol use: Not Currently    Drug use: No    Sexual activity: Yes     Social Drivers of Health     Financial Resource Strain: High Risk (10/29/2024)    Overall Financial Resource Strain (CARDIA)     Difficulty of Paying Living Expenses: Very hard   Food Insecurity: Food Insecurity Present (10/29/2024)    Hunger Vital Sign     Worried About Running Out of Food in the Last Year: Often true     Ran Out of Food in the Last Year:  Often true   Transportation Needs: No Transportation Needs (10/29/2024)    TRANSPORTATION NEEDS     Transportation : No   Physical Activity: Sufficiently Active (3/5/2024)    Exercise Vital Sign     Days of Exercise per Week: 5 days     Minutes of Exercise per Session: 30 min   Stress: Stress Concern Present (3/5/2024)    [employer] [place] [employer] - Occupational Stress Questionnaire     Feeling of Stress : Very much   Housing Stability: High Risk (3/5/2024)    Housing Stability Vital Sign     Unable to Pay for Housing in the Last Year: Yes     Number of Places Lived in the Last Year: 2     Unstable Housing in the Last Year: No

## 2025-03-11 NOTE — PROGRESS NOTES
Miles Currie is a 59 y.o. male here for a diabetic eye screening with non-dilated fundus photos per Dr. Gifford.    Patient cooperative?: Yes  Small pupils?: Yes  Last eye exam: 10/24/23    For exam results, see Encounter Report.

## 2025-03-11 NOTE — TELEPHONE ENCOUNTER
Pt here after MD visit and needed assistance with setting up digital glucometer.  Set up successful and first reading taken.  Advised that care team will contact him soon.  He is working to get Dexcom soon.  Advised to let his care team know about change.

## 2025-04-08 ENCOUNTER — OFFICE VISIT (OUTPATIENT)
Dept: INTERNAL MEDICINE | Facility: CLINIC | Age: 60
End: 2025-04-08
Payer: MEDICARE

## 2025-04-08 VITALS
DIASTOLIC BLOOD PRESSURE: 70 MMHG | RESPIRATION RATE: 18 BRPM | WEIGHT: 174.81 LBS | SYSTOLIC BLOOD PRESSURE: 118 MMHG | BODY MASS INDEX: 28.09 KG/M2 | HEART RATE: 85 BPM | HEIGHT: 66 IN

## 2025-04-08 DIAGNOSIS — Z72.0 TOBACCO USE: ICD-10-CM

## 2025-04-08 DIAGNOSIS — R11.0 NAUSEA: ICD-10-CM

## 2025-04-08 DIAGNOSIS — I73.9 PAD (PERIPHERAL ARTERY DISEASE): ICD-10-CM

## 2025-04-08 DIAGNOSIS — Z87.891 PERSONAL HISTORY OF NICOTINE DEPENDENCE: ICD-10-CM

## 2025-04-08 DIAGNOSIS — F19.10 POLYSUBSTANCE ABUSE: ICD-10-CM

## 2025-04-08 DIAGNOSIS — E11.65 UNCONTROLLED TYPE 2 DIABETES MELLITUS WITH HYPERGLYCEMIA: Primary | ICD-10-CM

## 2025-04-08 DIAGNOSIS — E78.5 HYPERLIPIDEMIA, UNSPECIFIED HYPERLIPIDEMIA TYPE: ICD-10-CM

## 2025-04-08 DIAGNOSIS — I50.42 CHRONIC COMBINED SYSTOLIC AND DIASTOLIC HEART FAILURE: ICD-10-CM

## 2025-04-08 DIAGNOSIS — Z12.11 COLON CANCER SCREENING: ICD-10-CM

## 2025-04-08 PROCEDURE — 99999 PR PBB SHADOW E&M-EST. PATIENT-LVL IV: CPT | Mod: PBBFAC,,, | Performed by: INTERNAL MEDICINE

## 2025-04-08 RX ORDER — TIRZEPATIDE 2.5 MG/.5ML
2.5 INJECTION, SOLUTION SUBCUTANEOUS WEEKLY
COMMUNITY
Start: 2025-03-10

## 2025-04-08 RX ORDER — BLOOD-GLUCOSE SENSOR
EACH MISCELLANEOUS
Qty: 5 EACH | Refills: 1 | Status: SHIPPED | OUTPATIENT
Start: 2025-04-08

## 2025-04-08 NOTE — PROGRESS NOTES
"Subjective:       Patient ID: Miles Currie is a 59 y.o. male.    Chief Complaint: Follow-up (Pt is here for 3 mth ck up)      HPI:    Patient is known to me and presents for follow up DM, PVD, CHF, NSVT, tobacco abuse and GERD. No new labs prior to today's visit.   Prior labs reviewed:  A1C 9.2%, elevated  PSA normal 9/2024  LDL 50, at goal   GFR > 60, normal        Diabetes: Remains uncontrolled but was down trending On Lantus 40 units BId, Jardiance 10mg (CHF), and Mounjaro 2.5mg listed today. He was on trulicity 3mg weekly. I had planned to change to ozempic or mounjaro but it appears another physican did; I assume cardiolgy but he is unsure. He is not taking every week however. He reports glucose had come down to 170-180s.     CAD/PVD/recurrent VT: on crestor, fenofibrate. On Plavix andEliquis for CAD. On coreg and entresto, jardiance, aldactone and torsemide for CHF management. Reports remains on amiodarone for h/o VT.  7/2023 He was having recurrent syncope and admitted for management. Underwent PCI of LAD and circumflex. Initially started o PO amiodarone but continued to have syncope and VT. He was then admitted again July 2023 for VT ablation. On amiodarone. Denies palpitatoins today.  He was admitted 2/2024 for angina and hypotension. Patient had [facility]/coronary angiography 2/19/24.  This revealed occluded saphenous vein grafts.  Patient underwent revascularization of the left circumflex InStent restenosis with balloon PTCA.   His most recent admit was for CHF exacerbation. Jardiance 10mg added. Still doing well. No worsening SOB, PIERRE or LE edema.       neuropathy/PAD. Failed Gabapentin, currently on cymbalta. States the Cialis helps his pian in the legs "feels like it opens everything up"  No acute pain issues reported today.      CHF: on entresto, torsemide/KCl and aldactone. No signs of volume overload today.      Bipolar: diagnosed by outside physician. On Xanax per outside provider and trazodone 50mg " at night.     Tobacco use: continues to smoke 1 PPD.      GERD: well controlled with protonix.      Daily nausea. Occasional vomiting. No constipation (BM every few days) or diarrhea.   Not associated with certain foods.  Just started stool softener    Past Medical History:   Diagnosis Date    Angina pectoris     Anticoagulant long-term use     plavix and  pletal    Anxiety and depression     Bipolar disorder     Patient denies    CHF (congestive heart failure)     Chronic bronchitis     Coronary artery disease     Diabetes mellitus     Diabetes with neurologic complications     Dyslipidemia     Encounter for blood transfusion     GERD (gastroesophageal reflux disease)     History of claustrophobia     IF SOMEONE TOUCHES HIS NOSE    Hyperlipidemia     Hypertension     Irregular heartbeat     Ischemic cardiomyopathy     MI (myocardial infarction) 2009    multiple MI's    Multiple gastric ulcers     Pacemaker     PAD (peripheral artery disease)     Pneumonia     Polyneuropathy     Presence of automatic implantable cardioverter-defibrillator     PVD (peripheral vascular disease)     Respiratory failure     Sleep apnea     does not have machine     Tobacco dependence     Trouble in sleeping     Type 2 diabetes with peripheral circulatory disorder, controlled        Family History   Problem Relation Name Age of Onset    Cancer Mother          bone    Heart attacks under age 50 Brother      Diabetes Daughter      Hypertension Father      Heart disease Father      Hyperlipidemia Father      Heart attacks under age 50 Paternal Grandfather         Social History[1]    Review of Systems   Constitutional:  Negative for activity change, fatigue, fever and unexpected weight change.   HENT:  Negative for congestion, ear pain, hearing loss, rhinorrhea and sore throat.    Eyes:  Negative for redness and visual disturbance.   Respiratory:  Negative for cough, shortness of breath and wheezing.    Cardiovascular:  Negative for chest  pain, palpitations and leg swelling.   Gastrointestinal:  Positive for constipation and nausea. Negative for abdominal pain, blood in stool, diarrhea and vomiting.   Musculoskeletal:  Negative for back pain, joint swelling and neck pain.   Skin:  Negative for color change, rash and wound.   Neurological:  Negative for dizziness, light-headedness and headaches.         Objective:      Physical Exam  Vitals reviewed.   Constitutional:       General: He is not in acute distress.     Appearance: He is well-developed.   HENT:      Head: Normocephalic and atraumatic.      Right Ear: External ear normal.      Left Ear: External ear normal.   Eyes:      General:         Right eye: No discharge.         Left eye: No discharge.      Conjunctiva/sclera: Conjunctivae normal.   Neck:      Thyroid: No thyromegaly.   Cardiovascular:      Rate and Rhythm: Normal rate and regular rhythm.      Heart sounds: No murmur heard.  Pulmonary:      Effort: Pulmonary effort is normal. No respiratory distress.      Breath sounds: Normal breath sounds.   Abdominal:      General: Bowel sounds are normal. There is distension (gaseous).      Palpations: Abdomen is soft.      Tenderness: There is no abdominal tenderness.   Skin:     General: Skin is warm and dry.   Neurological:      Mental Status: He is alert and oriented to person, place, and time.   Psychiatric:         Behavior: Behavior normal.         Thought Content: Thought content normal.         Assessment:       1. Uncontrolled type 2 diabetes mellitus with hyperglycemia    2. Hyperlipidemia, unspecified hyperlipidemia type    3. Chronic combined systolic and diastolic heart failure    4. PAD (peripheral artery disease)    5. Polysubstance abuse    6. Colon cancer screening    7. Tobacco use    8. Personal history of nicotine dependence    9. Nausea        Plan:       1. Uncontrolled type 2 diabetes mellitus with hyperglycemia  Chronic improving but uncontrolled  Agree with mounjaro but  not sure who prescribed. Likely cardiology  Stressed importance of compliance using weekly. Once taking for 4 weeks would increase to 5mg weekly. But need outside records.  Update labs per orders. Now then 3 months  Cont othe rmeds same dose  ADA diet  -     CBC Auto Differential; Future; Expected date: 04/08/2025  -     Comprehensive Metabolic Panel; Future; Expected date: 04/08/2025  -     Lipid Panel; Future; Expected date: 04/08/2025  -     Hemoglobin A1C; Future; Expected date: 04/08/2025  -     Microalbumin/Creatinine Ratio, Urine; Future; Expected date: 04/08/2025  -     CBC Auto Differential; Future; Expected date: 07/07/2025  -     Comprehensive Metabolic Panel; Future; Expected date: 07/07/2025  -     Hemoglobin A1C; Future; Expected date: 07/07/2025  -     Lipid Panel; Future; Expected date: 07/07/2025  -     blood-glucose sensor (DEXCOM G7 SENSOR) [place]; Check blood sugar continuously  Dispense: 5 each; Refill: 1    2. Hyperlipidemia, unspecified hyperlipidemia type  Chronic stable  Cont meds same dose  F/u CIS as planned  -     CBC Auto Differential; Future; Expected date: 07/07/2025  -     Comprehensive Metabolic Panel; Future; Expected date: 07/07/2025  -     Hemoglobin A1C; Future; Expected date: 07/07/2025  -     Lipid Panel; Future; Expected date: 07/07/2025    3. Chronic combined systolic and diastolic heart failure  Chronic stable  Cont meds same dose  F/u CIS as planned    4. PAD (peripheral artery disease)  Chronic stable  Cont meds same dose  F/u CIS as planned    5. Polysubstance abuse  Cessation counseling completed  Overview:  3/5/25 UDS + THC and amphetamine      6. Colon cancer screening  Fit dispensed  Given current comorbidities and meds not ideal C-scope candidate and would be high risk needing cardiology clearance for procedure  -     Cancel: Fecal Immunochemical Test (iFOBT); Future; Expected date: 04/08/2025  -     Fecal Immunochemical Test (iFOBT); Future; Expected date:  04/08/2025    7. Tobacco use  Agreeable to screening  -     CT Chest Lung Screening Low Dose; Future; Expected date: 04/08/2025    8. Personal history of nicotine dependence  Screening ordered  Cessation counseling  -     CT Chest Lung Screening Low Dose; Future; Expected date: 04/08/2025    9. Nausea  New problem  Unclear cause  Consider med side effects--? Mounjaro But not taking regularly and sx prior to starting per patient  Some constipatoin: start colace and miralax      RTC 3 months pending labs this week               [1]   Social History  Socioeconomic History    Marital status: Legally    Tobacco Use    Smoking status: Every Day     Current packs/day: 1.00     Average packs/day: 1 pack/day for 47.3 years (47.3 ttl pk-yrs)     Types: Cigarettes     Start date: 1978     Passive exposure: Current    Smokeless tobacco: Never   Substance and Sexual Activity    Alcohol use: Not Currently    Drug use: No    Sexual activity: Yes     Social Drivers of Health     Financial Resource Strain: High Risk (10/29/2024)    Overall Financial Resource Strain (CARDIA)     Difficulty of Paying Living Expenses: Very hard   Food Insecurity: Food Insecurity Present (10/29/2024)    Hunger Vital Sign     Worried About Running Out of Food in the Last Year: Often true     Ran Out of Food in the Last Year: Often true   Transportation Needs: No Transportation Needs (10/29/2024)    TRANSPORTATION NEEDS     Transportation : No   Physical Activity: Sufficiently Active (3/5/2024)    Exercise Vital Sign     Days of Exercise per Week: 5 days     Minutes of Exercise per Session: 30 min   Stress: Stress Concern Present (3/5/2024)    [employer] [place] [employer] - Occupational Stress Questionnaire     Feeling of Stress : Very much   Housing Stability: High Risk (3/5/2024)    Housing Stability Vital Sign     Unable to Pay for Housing in the Last Year: Yes     Number of Places Lived in the Last Year: 2     Unstable Housing in  the Last Year: No

## 2025-04-14 RX ORDER — INSULIN GLARGINE 100 [IU]/ML
40 INJECTION, SOLUTION SUBCUTANEOUS 2 TIMES DAILY
Qty: 72 ML | Refills: 3 | Status: SHIPPED | OUTPATIENT
Start: 2025-04-14 | End: 2026-04-14

## 2025-04-14 RX ORDER — PEN NEEDLE, DIABETIC 30 GX3/16"
NEEDLE, DISPOSABLE MISCELLANEOUS
Qty: 100 EACH | Refills: 1 | Status: SHIPPED | OUTPATIENT
Start: 2025-04-14

## 2025-04-14 NOTE — TELEPHONE ENCOUNTER
Care Due:                  Date            Visit Type   Department     Provider  --------------------------------------------------------------------------------                                EP -                              PRIMARY      [facility]  Last Visit: 04-      CARE ([facility])   MEDICINE II    Adela Gifford                               -                              PRIMARY      [facility]  Next Visit: 07-      CARE ([facility])   MEDICINE II    Adela Gifford                                                            Last  Test          Frequency    Reason                     Performed    Due Date  --------------------------------------------------------------------------------    HBA1C.......  6 months...  insulin..................  01- 07-    [facility] Embedded Care Due Messages. Reference number: [id number].   4/14/2025 11:24:44 AM CDT

## 2025-04-14 NOTE — TELEPHONE ENCOUNTER
----- Message from Catherine sent at 4/14/2025  9:44 AM CDT -----  Contact: pt  Miles HidalgoRN: [id number]: 1965PCP: Darrin Gifford Phone      [phone] Phone      Not on file.Mobile          [phone]: pt states he needs a prescription sent in for the needles for his insulin, [id number] [place] [facility] [address] [address] [address] [address]Kdezo: [phone] Fax: [phone]: Not open [phone]

## 2025-04-24 ENCOUNTER — LAB VISIT (OUTPATIENT)
Dept: LAB | Facility: HOSPITAL | Age: 60
End: 2025-04-24
Attending: INTERNAL MEDICINE
Payer: MEDICARE

## 2025-04-24 DIAGNOSIS — Z12.11 COLON CANCER SCREENING: ICD-10-CM

## 2025-04-24 LAB — OB PNL STL IA: POSITIVE

## 2025-04-24 PROCEDURE — 82274 ASSAY TEST FOR BLOOD FECAL: CPT

## 2025-05-05 ENCOUNTER — RESULTS FOLLOW-UP (OUTPATIENT)
Dept: INTERNAL MEDICINE | Facility: CLINIC | Age: 60
End: 2025-05-05

## 2025-05-07 LAB
LEFT EYE DM RETINOPATHY: NEGATIVE
RIGHT EYE DM RETINOPATHY: NEGATIVE

## 2025-05-16 ENCOUNTER — PATIENT OUTREACH (OUTPATIENT)
Dept: ADMINISTRATIVE | Facility: HOSPITAL | Age: 60
End: 2025-05-16
Payer: MEDICARE

## 2025-06-04 ENCOUNTER — PATIENT OUTREACH (OUTPATIENT)
Dept: ADMINISTRATIVE | Facility: HOSPITAL | Age: 60
End: 2025-06-04
Payer: MEDICARE

## 2025-06-05 ENCOUNTER — OFFICE VISIT (OUTPATIENT)
Dept: INTERNAL MEDICINE | Facility: CLINIC | Age: 60
End: 2025-06-05
Payer: MEDICARE

## 2025-06-05 VITALS
SYSTOLIC BLOOD PRESSURE: 118 MMHG | RESPIRATION RATE: 18 BRPM | WEIGHT: 174.63 LBS | HEART RATE: 70 BPM | OXYGEN SATURATION: 97 % | BODY MASS INDEX: 28.06 KG/M2 | DIASTOLIC BLOOD PRESSURE: 48 MMHG | HEIGHT: 66 IN

## 2025-06-05 DIAGNOSIS — Z01.818 PREOP GENERAL PHYSICAL EXAM: Primary | ICD-10-CM

## 2025-06-05 DIAGNOSIS — E11.59 TYPE 2 DIABETES MELLITUS WITH OTHER CIRCULATORY COMPLICATION, WITH LONG-TERM CURRENT USE OF INSULIN: ICD-10-CM

## 2025-06-05 DIAGNOSIS — I50.42 CHRONIC COMBINED SYSTOLIC AND DIASTOLIC HEART FAILURE: ICD-10-CM

## 2025-06-05 DIAGNOSIS — I25.119 CORONARY ARTERY DISEASE INVOLVING NATIVE CORONARY ARTERY OF NATIVE HEART WITH ANGINA PECTORIS: ICD-10-CM

## 2025-06-05 DIAGNOSIS — Z79.4 TYPE 2 DIABETES MELLITUS WITH OTHER CIRCULATORY COMPLICATION, WITH LONG-TERM CURRENT USE OF INSULIN: ICD-10-CM

## 2025-06-05 PROCEDURE — 99999 PR PBB SHADOW E&M-EST. PATIENT-LVL IV: CPT | Mod: PBBFAC,,, | Performed by: INTERNAL MEDICINE

## 2025-06-05 PROCEDURE — 4010F ACE/ARB THERAPY RXD/TAKEN: CPT | Mod: CPTII,S$GLB,, | Performed by: INTERNAL MEDICINE

## 2025-06-05 PROCEDURE — G2211 COMPLEX E/M VISIT ADD ON: HCPCS | Mod: S$GLB,,, | Performed by: INTERNAL MEDICINE

## 2025-06-05 PROCEDURE — 1159F MED LIST DOCD IN RCRD: CPT | Mod: CPTII,S$GLB,, | Performed by: INTERNAL MEDICINE

## 2025-06-05 PROCEDURE — 3078F DIAST BP <80 MM HG: CPT | Mod: CPTII,S$GLB,, | Performed by: INTERNAL MEDICINE

## 2025-06-05 PROCEDURE — 3074F SYST BP LT 130 MM HG: CPT | Mod: CPTII,S$GLB,, | Performed by: INTERNAL MEDICINE

## 2025-06-05 PROCEDURE — 1160F RVW MEDS BY RX/DR IN RCRD: CPT | Mod: CPTII,S$GLB,, | Performed by: INTERNAL MEDICINE

## 2025-06-05 PROCEDURE — 2023F DILAT RTA XM W/O RTNOPTHY: CPT | Mod: CPTII,S$GLB,, | Performed by: INTERNAL MEDICINE

## 2025-06-05 PROCEDURE — 3046F HEMOGLOBIN A1C LEVEL >9.0%: CPT | Mod: CPTII,S$GLB,, | Performed by: INTERNAL MEDICINE

## 2025-06-05 PROCEDURE — 99214 OFFICE O/P EST MOD 30 MIN: CPT | Mod: S$GLB,,, | Performed by: INTERNAL MEDICINE

## 2025-06-05 PROCEDURE — 3008F BODY MASS INDEX DOCD: CPT | Mod: CPTII,S$GLB,, | Performed by: INTERNAL MEDICINE

## 2025-06-30 ENCOUNTER — LAB VISIT (OUTPATIENT)
Dept: LAB | Facility: HOSPITAL | Age: 60
End: 2025-06-30
Attending: INTERNAL MEDICINE
Payer: MEDICARE

## 2025-06-30 DIAGNOSIS — E78.5 HYPERLIPIDEMIA, UNSPECIFIED HYPERLIPIDEMIA TYPE: ICD-10-CM

## 2025-06-30 DIAGNOSIS — E11.65 UNCONTROLLED TYPE 2 DIABETES MELLITUS WITH HYPERGLYCEMIA: ICD-10-CM

## 2025-06-30 LAB
ABSOLUTE EOSINOPHIL (OHS): 0.29 K/UL
ABSOLUTE MONOCYTE (OHS): 0.79 K/UL (ref 0.3–1)
ABSOLUTE NEUTROPHIL COUNT (OHS): 6.6 K/UL (ref 1.8–7.7)
ALBUMIN SERPL BCP-MCNC: 3.8 G/DL (ref 3.5–5.2)
ALP SERPL-CCNC: 116 UNIT/L (ref 40–150)
ALT SERPL W/O P-5'-P-CCNC: 26 UNIT/L (ref 10–44)
ANION GAP (OHS): 11 MMOL/L (ref 8–16)
AST SERPL-CCNC: 27 UNIT/L (ref 11–45)
BASOPHILS # BLD AUTO: 0.05 K/UL
BASOPHILS NFR BLD AUTO: 0.6 %
BILIRUB SERPL-MCNC: 0.7 MG/DL (ref 0.1–1)
BUN SERPL-MCNC: 15 MG/DL (ref 6–20)
CALCIUM SERPL-MCNC: 9.3 MG/DL (ref 8.7–10.5)
CHLORIDE SERPL-SCNC: 103 MMOL/L (ref 95–110)
CHOLEST SERPL-MCNC: 136 MG/DL (ref 120–199)
CHOLEST/HDLC SERPL: 3.7 {RATIO} (ref 2–5)
CO2 SERPL-SCNC: 25 MMOL/L (ref 23–29)
CREAT SERPL-MCNC: 1.2 MG/DL (ref 0.5–1.4)
EAG (OHS): 214 MG/DL (ref 68–131)
ERYTHROCYTE [DISTWIDTH] IN BLOOD BY AUTOMATED COUNT: 14.6 % (ref 11.5–14.5)
GFR SERPLBLD CREATININE-BSD FMLA CKD-EPI: >60 ML/MIN/1.73/M2
GLUCOSE SERPL-MCNC: 127 MG/DL (ref 70–110)
HBA1C MFR BLD: 9.1 % (ref 4–5.6)
HCT VFR BLD AUTO: 46.3 % (ref 40–54)
HDLC SERPL-MCNC: 37 MG/DL (ref 40–75)
HDLC SERPL: 27.2 % (ref 20–50)
HGB BLD-MCNC: 15.1 GM/DL (ref 14–18)
IMM GRANULOCYTES # BLD AUTO: 0.06 K/UL (ref 0–0.04)
IMM GRANULOCYTES NFR BLD AUTO: 0.7 % (ref 0–0.5)
LDLC SERPL CALC-MCNC: 68.2 MG/DL (ref 63–159)
LYMPHOCYTES # BLD AUTO: 1.04 K/UL (ref 1–4.8)
MCH RBC QN AUTO: 30.6 PG (ref 27–31)
MCHC RBC AUTO-ENTMCNC: 32.6 G/DL (ref 32–36)
MCV RBC AUTO: 94 FL (ref 82–98)
NONHDLC SERPL-MCNC: 99 MG/DL
NUCLEATED RBC (/100WBC) (OHS): 0 /100 WBC
PLATELET # BLD AUTO: 189 K/UL (ref 150–450)
PMV BLD AUTO: 10 FL (ref 9.2–12.9)
POTASSIUM SERPL-SCNC: 3.2 MMOL/L (ref 3.5–5.1)
PROT SERPL-MCNC: 7.7 GM/DL (ref 6–8.4)
RBC # BLD AUTO: 4.94 M/UL (ref 4.6–6.2)
RELATIVE EOSINOPHIL (OHS): 3.3 %
RELATIVE LYMPHOCYTE (OHS): 11.8 % (ref 18–48)
RELATIVE MONOCYTE (OHS): 8.9 % (ref 4–15)
RELATIVE NEUTROPHIL (OHS): 74.7 % (ref 38–73)
SODIUM SERPL-SCNC: 139 MMOL/L (ref 136–145)
TRIGL SERPL-MCNC: 154 MG/DL (ref 30–150)
WBC # BLD AUTO: 8.83 K/UL (ref 3.9–12.7)

## 2025-06-30 PROCEDURE — 36415 COLL VENOUS BLD VENIPUNCTURE: CPT

## 2025-06-30 PROCEDURE — 82465 ASSAY BLD/SERUM CHOLESTEROL: CPT

## 2025-06-30 PROCEDURE — 85025 COMPLETE CBC W/AUTO DIFF WBC: CPT

## 2025-06-30 PROCEDURE — 82040 ASSAY OF SERUM ALBUMIN: CPT

## 2025-06-30 PROCEDURE — 83036 HEMOGLOBIN GLYCOSYLATED A1C: CPT

## 2025-07-09 ENCOUNTER — OFFICE VISIT (OUTPATIENT)
Dept: INTERNAL MEDICINE | Facility: CLINIC | Age: 60
End: 2025-07-09
Payer: MEDICARE

## 2025-07-09 VITALS
OXYGEN SATURATION: 95 % | DIASTOLIC BLOOD PRESSURE: 62 MMHG | WEIGHT: 174.81 LBS | HEIGHT: 66 IN | RESPIRATION RATE: 18 BRPM | SYSTOLIC BLOOD PRESSURE: 112 MMHG | HEART RATE: 64 BPM | BODY MASS INDEX: 28.09 KG/M2

## 2025-07-09 DIAGNOSIS — S68.622D PARTIAL TRAUMATIC AMPUTATION OF RIGHT MIDDLE FINGER THROUGH PHALANX, SUBSEQUENT ENCOUNTER: ICD-10-CM

## 2025-07-09 DIAGNOSIS — Z79.4 TYPE 2 DIABETES MELLITUS WITH OTHER CIRCULATORY COMPLICATION, WITH LONG-TERM CURRENT USE OF INSULIN: Primary | ICD-10-CM

## 2025-07-09 DIAGNOSIS — E11.59 TYPE 2 DIABETES MELLITUS WITH OTHER CIRCULATORY COMPLICATION, WITH LONG-TERM CURRENT USE OF INSULIN: Primary | ICD-10-CM

## 2025-07-09 DIAGNOSIS — I73.9 PAD (PERIPHERAL ARTERY DISEASE): ICD-10-CM

## 2025-07-09 DIAGNOSIS — Z95.810 AUTOMATIC IMPLANTABLE CARDIAC DEFIBRILLATOR IN SITU: ICD-10-CM

## 2025-07-09 DIAGNOSIS — I50.42 CHRONIC COMBINED SYSTOLIC AND DIASTOLIC HEART FAILURE: ICD-10-CM

## 2025-07-09 DIAGNOSIS — E78.5 HYPERLIPIDEMIA, UNSPECIFIED HYPERLIPIDEMIA TYPE: ICD-10-CM

## 2025-07-09 DIAGNOSIS — E11.65 UNCONTROLLED TYPE 2 DIABETES MELLITUS WITH HYPERGLYCEMIA: ICD-10-CM

## 2025-07-09 DIAGNOSIS — I25.119 CORONARY ARTERY DISEASE INVOLVING NATIVE CORONARY ARTERY OF NATIVE HEART WITH ANGINA PECTORIS: ICD-10-CM

## 2025-07-09 DIAGNOSIS — R41.3 OTHER AMNESIA: ICD-10-CM

## 2025-07-09 DIAGNOSIS — Z79.899 OTHER LONG TERM (CURRENT) DRUG THERAPY: ICD-10-CM

## 2025-07-09 DIAGNOSIS — K21.9 GASTROESOPHAGEAL REFLUX DISEASE WITHOUT ESOPHAGITIS: ICD-10-CM

## 2025-07-09 DIAGNOSIS — F17.200 TOBACCO USE DISORDER: ICD-10-CM

## 2025-07-09 DIAGNOSIS — Z12.5 PROSTATE CANCER SCREENING: ICD-10-CM

## 2025-07-09 PROBLEM — Z22.322 MRSA (METHICILLIN RESISTANT STAPH AUREUS) CULTURE POSITIVE: Status: RESOLVED | Noted: 2024-09-06 | Resolved: 2025-07-09

## 2025-07-09 PROBLEM — I50.23 ACUTE ON CHRONIC SYSTOLIC (CONGESTIVE) HEART FAILURE: Status: RESOLVED | Noted: 2025-03-03 | Resolved: 2025-07-09

## 2025-07-09 PROBLEM — J81.0 ACUTE PULMONARY EDEMA: Status: RESOLVED | Noted: 2025-02-25 | Resolved: 2025-07-09

## 2025-07-09 PROBLEM — R10.9 ABDOMINAL PAIN: Status: RESOLVED | Noted: 2025-02-25 | Resolved: 2025-07-09

## 2025-07-09 PROBLEM — U07.1 COVID-19 VIRUS INFECTION: Status: RESOLVED | Noted: 2024-02-21 | Resolved: 2025-07-09

## 2025-07-09 PROBLEM — I50.9 ACUTE EXACERBATION OF CHF (CONGESTIVE HEART FAILURE): Status: RESOLVED | Noted: 2025-02-25 | Resolved: 2025-07-09

## 2025-07-09 PROBLEM — D69.6 THROMBOCYTOPENIA, UNSPECIFIED: Status: RESOLVED | Noted: 2024-03-05 | Resolved: 2025-07-09

## 2025-07-09 PROBLEM — I95.9 HYPOTENSION: Status: RESOLVED | Noted: 2024-02-21 | Resolved: 2025-07-09

## 2025-07-09 PROCEDURE — 4010F ACE/ARB THERAPY RXD/TAKEN: CPT | Mod: CPTII,S$GLB,, | Performed by: INTERNAL MEDICINE

## 2025-07-09 PROCEDURE — 99215 OFFICE O/P EST HI 40 MIN: CPT | Mod: S$GLB,,, | Performed by: INTERNAL MEDICINE

## 2025-07-09 PROCEDURE — 1159F MED LIST DOCD IN RCRD: CPT | Mod: CPTII,S$GLB,, | Performed by: INTERNAL MEDICINE

## 2025-07-09 PROCEDURE — 3046F HEMOGLOBIN A1C LEVEL >9.0%: CPT | Mod: CPTII,S$GLB,, | Performed by: INTERNAL MEDICINE

## 2025-07-09 PROCEDURE — 3078F DIAST BP <80 MM HG: CPT | Mod: CPTII,S$GLB,, | Performed by: INTERNAL MEDICINE

## 2025-07-09 PROCEDURE — 1160F RVW MEDS BY RX/DR IN RCRD: CPT | Mod: CPTII,S$GLB,, | Performed by: INTERNAL MEDICINE

## 2025-07-09 PROCEDURE — 99999 PR PBB SHADOW E&M-EST. PATIENT-LVL IV: CPT | Mod: PBBFAC,,, | Performed by: INTERNAL MEDICINE

## 2025-07-09 PROCEDURE — G2211 COMPLEX E/M VISIT ADD ON: HCPCS | Mod: S$GLB,,, | Performed by: INTERNAL MEDICINE

## 2025-07-09 PROCEDURE — 2023F DILAT RTA XM W/O RTNOPTHY: CPT | Mod: CPTII,S$GLB,, | Performed by: INTERNAL MEDICINE

## 2025-07-09 PROCEDURE — 3074F SYST BP LT 130 MM HG: CPT | Mod: CPTII,S$GLB,, | Performed by: INTERNAL MEDICINE

## 2025-07-09 PROCEDURE — 3008F BODY MASS INDEX DOCD: CPT | Mod: CPTII,S$GLB,, | Performed by: INTERNAL MEDICINE

## 2025-07-09 RX ORDER — KETOROLAC TROMETHAMINE 5 MG/ML
1 SOLUTION OPHTHALMIC 4 TIMES DAILY
COMMUNITY
Start: 2025-05-07

## 2025-07-09 RX ORDER — TIRZEPATIDE 5 MG/.5ML
5 INJECTION, SOLUTION SUBCUTANEOUS
Qty: 2 ML | Refills: 0 | Status: SHIPPED | OUTPATIENT
Start: 2025-07-09

## 2025-07-09 NOTE — PROGRESS NOTES
"Subjective:       Patient ID: Miles Currie is a 59 y.o. male.    Chief Complaint: Follow-up (3 mo f/u)      HPI:  Patient is known to me and presents for follow up DM, PVD, CHF, NSVT, tobacco abuse and GERD.  Labs from 06/30/2025 personally reviewed, interpreted, discussed with the patient today.   A1C 9.1%, elevated  PSA normal 9/2024-repeat next visit  LDL 60, at goal   GFR > 60, normal        Diabetes: Remains uncontrolled but was down trending On Lantus 40 units BId, Jardiance 10mg (CHF), and Mounjaro 2.5mg listed today. He was on trulicity 3mg weekly. I had planned to change to ozempic or mounjaro but it appears another physican did; I assume cardiolgy but he is unsure.  He is having continued neuropathy pain in his feet with his elevated glucose     CAD/PVD/recurrent VT: on crestor, fenofibrate. On Plavix andEliquis for CAD. On coreg and entresto, jardiance, aldactone and torsemide for CHF management. Reports remains on amiodarone for h/o VT.  7/2023 He was having recurrent syncope and admitted for management. Underwent PCI of LAD and circumflex. Initially started o PO amiodarone but continued to have syncope and VT. He was then admitted again July 2023 for VT ablation. On amiodarone. Denies palpitatoins today.  He was admitted 2/2024 for angina and hypotension. Patient had [facility]/coronary angiography 2/19/24.  This revealed occluded saphenous vein grafts.  Patient underwent revascularization of the left circumflex InStent restenosis with balloon PTCA.   His most recent admit was for CHF exacerbation. Jardiance 10mg added. Still doing well. No worsening SOB, PIERRE or LE edema.       neuropathy/PAD. Failed Gabapentin, currently on cymbalta. States the Cialis helps his pian in the legs "feels like it opens everything up"     CHF: on entresto, torsemide/KCl and aldactone. No signs of volume overload today.      Bipolar: diagnosed by outside physician. On Xanax per outside provider and trazodone 50mg at night.   " "  Tobacco use: continues to smoke 1 PPD. not ready to quit     GERD: well controlled with protonix.      Today he reporting worsening memory loss. Worsening over last 2 months. He can't remember doctor's appointments. Having difficulty with names and recall. Can't recall if he took his medications. Difficulty spelling words he used to know how to spell. Denies issues with driving. He is also having long term memory issues-"I can't recall anything from my childhood" No falls or head trauma.        Past Medical History:   Diagnosis Date    Angina pectoris     Anticoagulant long-term use     plavix and  pletal    Anxiety and depression     Bipolar disorder     Patient denies    CHF (congestive heart failure)     Chronic bronchitis     Coronary artery disease     Diabetes mellitus     Diabetes with neurologic complications     Dyslipidemia     Encounter for blood transfusion     GERD (gastroesophageal reflux disease)     History of claustrophobia     IF SOMEONE TOUCHES HIS NOSE    Hyperlipidemia     Hypertension     Irregular heartbeat     Ischemic cardiomyopathy     MI (myocardial infarction) 2009    multiple MI's    Multiple gastric ulcers     Pacemaker     PAD (peripheral artery disease)     Pneumonia     Polyneuropathy     Presence of automatic implantable cardioverter-defibrillator     PVD (peripheral vascular disease)     Respiratory failure     Sleep apnea     does not have machine     Tobacco dependence     Trouble in sleeping     Type 2 diabetes with peripheral circulatory disorder, controlled        Family History   Problem Relation Name Age of Onset    Cancer Mother          bone    Heart attacks under age 50 Brother      Diabetes Daughter      Hypertension Father      Heart disease Father      Hyperlipidemia Father      Heart attacks under age 50 Paternal Grandfather         Social History[1]    Review of Systems   Constitutional:  Negative for activity change, fatigue, fever and unexpected weight change. "   HENT:  Negative for congestion, ear pain, hearing loss, rhinorrhea and sore throat.    Eyes:  Negative for redness and visual disturbance.   Respiratory:  Negative for cough, shortness of breath and wheezing.    Cardiovascular:  Negative for chest pain, palpitations and leg swelling.   Gastrointestinal:  Negative for abdominal pain, constipation, diarrhea, nausea and vomiting.   Genitourinary:  Negative for dysuria, frequency and urgency.   Skin:  Negative for color change, rash and wound.   Neurological:  Positive for numbness (feet, chronic). Negative for dizziness, tremors, light-headedness and headaches.        Memory loss         Objective:      Physical Exam  Vitals reviewed.   Constitutional:       General: He is not in acute distress.     Appearance: He is well-developed.   HENT:      Head: Normocephalic and atraumatic.      Right Ear: External ear normal.      Left Ear: External ear normal.   Eyes:      General:         Right eye: No discharge.         Left eye: No discharge.      Conjunctiva/sclera: Conjunctivae normal.   Neck:      Thyroid: No thyromegaly.   Cardiovascular:      Rate and Rhythm: Normal rate and regular rhythm.      Heart sounds: No murmur heard.  Pulmonary:      Effort: Pulmonary effort is normal. No respiratory distress.      Breath sounds: Normal breath sounds. No rales.   Abdominal:      General: There is no distension.      Palpations: Abdomen is soft.      Tenderness: There is no abdominal tenderness.   Skin:     General: Skin is warm and dry.   Neurological:      Mental Status: He is alert and oriented to person, place, and time. Mental status is at baseline.   Psychiatric:         Behavior: Behavior normal.         Thought Content: Thought content normal.         Assessment:       1. Type 2 diabetes mellitus with other circulatory complication, with long-term current use of insulin    2. Uncontrolled type 2 diabetes mellitus with hyperglycemia    3. Coronary artery disease  involving native coronary artery of native heart with angina pectoris    4. Chronic combined systolic and diastolic heart failure    5. Hyperlipidemia, unspecified hyperlipidemia type    6. PAD (peripheral artery disease)    7. Automatic implantable cardiac defibrillator in situ    8. Gastroesophageal reflux disease without esophagitis    9. 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 Tobacco use disorder    11. Other amnesia    12. Other long term (current) drug therapy    13. Prostate cancer screening        Plan:       1. Type 2 diabetes mellitus with other circulatory complication, with long-term current use of insulin  Chronic uncontrolled  Continue insulin and oral medications same dose  Increase Mounjaro 2 5 mg weekly with plans to continue to increase dose as tolerated  ADA diet  Check sugars and keep log  Continue statin  -     tirzepatide (MOUNJARO) 5 mg/0.5 mL PnIj; Inject 5 mg into the skin every 7 days.  Dispense: 2 mL; Refill: 0  -     CBC Auto Differential; Future; Expected date: 01/05/2026  -     Comprehensive Metabolic Panel; Future; Expected date: 01/05/2026  -     TSH; Future; Expected date: 01/05/2026  -     Lipid Panel; Future; Expected date: 01/05/2026  -     Hemoglobin A1C; Future; Expected date: 01/05/2026  -     Microalbumin/Creatinine Ratio, Urine; Future; Expected date: 01/05/2026    2. Uncontrolled type 2 diabetes mellitus with hyperglycemia  Chronic uncontrolled  Continue insulin and oral medications same dose  Increase Mounjaro 2 5 mg weekly with plans to continue to increase dose as tolerated  ADA diet  Check sugars and keep log  Continue statin  -     tirzepatide (MOUNJARO) 5 mg/0.5 mL PnIj; Inject 5 mg into the skin every 7 days.  Dispense: 2 mL; Refill: 0  -     CBC Auto Differential; Future; Expected date: 01/05/2026  -     Comprehensive Metabolic Panel; Future; Expected date: 01/05/2026  -     TSH; Future; Expected date:  01/05/2026  -     Lipid Panel; Future; Expected date: 01/05/2026  -     Hemoglobin A1C; Future; Expected date: 01/05/2026  -     Microalbumin/Creatinine Ratio, Urine; Future; Expected date: 01/05/2026    3. Coronary artery disease involving native coronary artery of native heart with angina pectoris  Chronic stable  Continue statin Plavix  Follow up with Cardiology as planned  -     CBC Auto Differential; Future; Expected date: 01/05/2026  -     Comprehensive Metabolic Panel; Future; Expected date: 01/05/2026  -     TSH; Future; Expected date: 01/05/2026  -     Lipid Panel; Future; Expected date: 01/05/2026  -     Hemoglobin A1C; Future; Expected date: 01/05/2026  -     Microalbumin/Creatinine Ratio, Urine; Future; Expected date: 01/05/2026    4. Chronic combined systolic and diastolic heart failure  Chronic stable  Euvolemic today  Continue current medications same dose  Follow up with Cardiology as planned  -     CBC Auto Differential; Future; Expected date: 01/05/2026  -     Comprehensive Metabolic Panel; Future; Expected date: 01/05/2026  -     TSH; Future; Expected date: 01/05/2026  -     Lipid Panel; Future; Expected date: 01/05/2026  -     Hemoglobin A1C; Future; Expected date: 01/05/2026  -     Microalbumin/Creatinine Ratio, Urine; Future; Expected date: 01/05/2026    5. Hyperlipidemia, unspecified hyperlipidemia type  Chronic stable  Continue statin  -     CBC Auto Differential; Future; Expected date: 01/05/2026  -     Comprehensive Metabolic Panel; Future; Expected date: 01/05/2026  -     TSH; Future; Expected date: 01/05/2026  -     Lipid Panel; Future; Expected date: 01/05/2026  -     Hemoglobin A1C; Future; Expected date: 01/05/2026  -     Microalbumin/Creatinine Ratio, Urine; Future; Expected date: 01/05/2026    6. PAD (peripheral artery disease)  Chronic stable  Continue statin  Continue Plavix  -     CBC Auto Differential; Future; Expected date: 01/05/2026  -     Comprehensive Metabolic Panel;  Future; Expected date: 01/05/2026  -     TSH; Future; Expected date: 01/05/2026  -     Lipid Panel; Future; Expected date: 01/05/2026  -     Hemoglobin A1C; Future; Expected date: 01/05/2026  -     Microalbumin/Creatinine Ratio, Urine; Future; Expected date: 01/05/2026    7. Automatic implantable cardiac defibrillator in situ  History noted  Follow up with Cardiology  Due to history of VT; continue amiodarone    8. Gastroesophageal reflux disease without esophagitis  Chronic stable  Continue PPI    9. 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 Tobacco use disorder  Spent greater than 10 minutes on smoking cessation counseling  Declines smoking cessation clinic referral  He is not ready to quit    11. Other amnesia  New problem  We will check labs and MRI brain as initial step in workup  Patient does have ICD in place.  He was told he can have an MRI but I did ask him to obtain documentation from his cardiology team prior to his MRI.  Because I do not have his full cardiac records I may have missed information this device may not be MRI safe and I suspect we may not be able to obtain MRI at this facility.  If not we will proceed with CTA  Pending workup and refer to Neurology  -     Vitamin D; Future; Expected date: 07/09/2025  -     Vitamin B12; Future; Expected date: 07/09/2025  -     Folate; Future; Expected date: 07/09/2025  -     Vitamin B1; Future; Expected date: 07/09/2025  -     MRI Brain Without Contrast; Future; Expected date: 07/09/2025    12. Other long term (current) drug therapy  Update labs for orders  -     Vitamin D; Future; Expected date: 07/09/2025    13. Prostate cancer screening  Update labs per orders  -     PSA, Screening; Future; Expected date: 01/05/2026       Return to clinic as scheduled with labs and PRN pending above workup for acute issues               [1]   Social History  Socioeconomic History     Marital status: Legally    Tobacco Use    Smoking status: Every Day     Current packs/day: 1.00     Average packs/day: 1 pack/day for 47.5 years (47.5 ttl pk-yrs)     Types: Cigarettes     Start date: 1978     Passive exposure: Current    Smokeless tobacco: Never   Substance and Sexual Activity    Alcohol use: Not Currently    Drug use: No    Sexual activity: Yes     Social Drivers of Health     Financial Resource Strain: High Risk (10/29/2024)    Overall Financial Resource Strain (CARDIA)     Difficulty of Paying Living Expenses: Very hard   Food Insecurity: Food Insecurity Present (10/29/2024)    Hunger Vital Sign     Worried About Running Out of Food in the Last Year: Often true     Ran Out of Food in the Last Year: Often true   Transportation Needs: No Transportation Needs (10/29/2024)    TRANSPORTATION NEEDS     Transportation : No   Physical Activity: Sufficiently Active (3/5/2024)    Exercise Vital Sign     Days of Exercise per Week: 5 days     Minutes of Exercise per Session: 30 min   Stress: Stress Concern Present (3/5/2024)    [employer] - Occupational Stress Questionnaire     Feeling of Stress : Very much   Housing Stability: High Risk (3/5/2024)    Housing Stability Vital Sign     Unable to Pay for Housing in the Last Year: Yes     Number of Places Lived in the Last Year: 2     Unstable Housing in the Last Year: No

## 2025-07-11 ENCOUNTER — HOSPITAL ENCOUNTER (OUTPATIENT)
Dept: RADIOLOGY | Facility: HOSPITAL | Age: 60
Discharge: HOME | End: 2025-07-11
Attending: INTERNAL MEDICINE
Payer: MEDICARE

## 2025-07-11 DIAGNOSIS — R41.3 OTHER AMNESIA: ICD-10-CM

## 2025-07-19 RX ORDER — PEN NEEDLE, DIABETIC 30 GX3/16"
NEEDLE, DISPOSABLE MISCELLANEOUS
Qty: 200 EACH | Refills: 3 | Status: SHIPPED | OUTPATIENT
Start: 2025-07-19

## 2025-07-19 NOTE — TELEPHONE ENCOUNTER
No care due was identified.  [facility] Embedded Care Due Messages. Reference number: [id number].   7/19/2025 6:56:31 AM CDT

## 2025-07-19 NOTE — TELEPHONE ENCOUNTER
Refill Decision Note   Miles Rosey  is requesting a refill authorization.  Brief Assessment and Rationale for Refill:  Approve     Medication Therapy Plan:         Comments:     Note composed:12:11 PM 07/19/2025